# Patient Record
Sex: FEMALE | Race: WHITE | NOT HISPANIC OR LATINO | Employment: OTHER | URBAN - METROPOLITAN AREA
[De-identification: names, ages, dates, MRNs, and addresses within clinical notes are randomized per-mention and may not be internally consistent; named-entity substitution may affect disease eponyms.]

---

## 2017-01-10 ENCOUNTER — TELEPHONE (OUTPATIENT)
Dept: FAMILY MEDICINE CLINIC | Facility: CLINIC | Age: 82
End: 2017-01-10

## 2017-01-10 RX ORDER — CODEINE/BUTALBITAL/ASA/CAFFEIN 30-50-325
1 CAPSULE ORAL 4 TIMES DAILY PRN
Qty: 100 CAPSULE | Refills: 0 | Status: SHIPPED | OUTPATIENT
Start: 2017-01-10 | End: 2017-05-08 | Stop reason: SDUPTHER

## 2017-01-10 NOTE — TELEPHONE ENCOUNTER
----- Message from Lady Pacheoc sent at 1/10/2017 11:05 AM EST -----  Refill   [facility]         PLEASE PRINT PATIENT WILL

## 2017-02-21 ENCOUNTER — OFFICE VISIT (OUTPATIENT)
Dept: OBSTETRICS AND GYNECOLOGY | Facility: CLINIC | Age: 82
End: 2017-02-21

## 2017-02-21 VITALS
HEIGHT: 63 IN | BODY MASS INDEX: 19.88 KG/M2 | SYSTOLIC BLOOD PRESSURE: 148 MMHG | DIASTOLIC BLOOD PRESSURE: 88 MMHG | WEIGHT: 112.2 LBS

## 2017-02-21 DIAGNOSIS — Z01.419 WELL FEMALE EXAM WITH ROUTINE GYNECOLOGICAL EXAM: ICD-10-CM

## 2017-02-21 DIAGNOSIS — B37.31 YEAST VAGINITIS: ICD-10-CM

## 2017-02-21 DIAGNOSIS — N30.90 CYSTITIS: ICD-10-CM

## 2017-02-21 DIAGNOSIS — N95.2 ATROPHIC VAGINITIS: ICD-10-CM

## 2017-02-21 DIAGNOSIS — Z13.1 DIABETES MELLITUS SCREENING: ICD-10-CM

## 2017-02-21 DIAGNOSIS — R35.0 FREQUENCY OF URINATION: Primary | ICD-10-CM

## 2017-02-21 PROCEDURE — G0101 CA SCREEN;PELVIC/BREAST EXAM: HCPCS | Performed by: OBSTETRICS & GYNECOLOGY

## 2017-02-21 PROCEDURE — 99213 OFFICE O/P EST LOW 20 MIN: CPT | Performed by: OBSTETRICS & GYNECOLOGY

## 2017-02-21 NOTE — PROGRESS NOTES
Subjective:    Patient Veronica Babb is a 82 y.o. female.   Chief Complaint   Patient presents with   • Gynecologic Exam     Hyst. Mammo done due. Colonoscopy 6 yrs ago. Dexa due.     's patient presents today with urinary frequency which is significant every hour  during the day nocturia is not significant she does have some burning and pressure when she voids she was on the Femring and stopped it about a year ago worried about the hormones    HPI  this patient stopped her Femring a year ago with the fear of the hormones over the past month she is noted increasing urination with irritation and burning      The following portions of the patient's history were reviewed and updated as appropriate: allergies, current medications, past family history, past medical history, past social history, past surgical history and problem list.      Review of Systems   Constitutional: Negative.    HENT: Negative.    Eyes: Negative.    Respiratory: Negative.    Cardiovascular: Negative.    Gastrointestinal: Negative for abdominal distention, abdominal pain, anal bleeding, blood in stool, constipation, diarrhea, nausea, rectal pain and vomiting.   Endocrine: Negative for cold intolerance, heat intolerance, polydipsia, polyphagia and polyuria.   Genitourinary: Positive for dysuria, frequency and urgency. Negative for decreased urine volume, dyspareunia, enuresis, flank pain, genital sores, hematuria, menstrual problem, pelvic pain, vaginal bleeding, vaginal discharge and vaginal pain.        Patient also has been experiencing some bladder pressure   Musculoskeletal: Negative.    Skin: Negative.    Allergic/Immunologic: Negative.    Neurological: Negative.    Hematological: Negative for adenopathy. Does not bruise/bleed easily.   Psychiatric/Behavioral: Negative for agitation, confusion and sleep disturbance. The patient is not nervous/anxious.          Objective:      Physical Exam   Constitutional: She appears well-developed and  well-nourished. She is not intubated.   HENT:   Head: Hair is normal.   Nose: Nose normal.   Mouth/Throat: Oropharynx is clear and moist.   Eyes: Conjunctivae are normal.   Neck: Normal carotid pulses and no JVD present. No tracheal tenderness, no spinous process tenderness and no muscular tenderness present. Carotid bruit is not present. No rigidity. No edema, no erythema and normal range of motion present. No thyroid mass and no thyromegaly present.   Cardiovascular: Normal rate, regular rhythm, S1 normal and normal heart sounds.  Exam reveals no gallop.    No murmur heard.  Pulmonary/Chest: Effort normal. No accessory muscle usage or stridor. No apnea, no tachypnea and no bradypnea. She is not intubated. No respiratory distress. She has no wheezes. She has no rales. She exhibits no tenderness. Right breast exhibits no inverted nipple, no mass, no nipple discharge, no skin change and no tenderness. Left breast exhibits no inverted nipple, no mass, no nipple discharge, no skin change and no tenderness.   Abdominal: Soft. Bowel sounds are normal. She exhibits no distension and no mass. There is no tenderness. There is no rebound and no guarding. No hernia.   Genitourinary: Vagina normal. Rectal exam shows no external hemorrhoid, no internal hemorrhoid, no fissure, no mass, no tenderness and anal tone normal. There is no rash, tenderness, lesion or injury on the right labia. There is no rash, tenderness, lesion or injury on the left labia. No erythema, tenderness or bleeding in the vagina. No foreign body in the vagina. No signs of injury around the vagina. No vaginal discharge found.   Genitourinary Comments: Uterus has been removed   Musculoskeletal: She exhibits no edema or tenderness.        Right shoulder: She exhibits no tenderness, no swelling, no pain and no spasm.   Lymphadenopathy:        Head (right side): No submental, no submandibular, no tonsillar, no preauricular, no posterior auricular and no  occipital adenopathy present.        Head (left side): No submental, no submandibular, no tonsillar, no preauricular, no posterior auricular and no occipital adenopathy present.     She has no cervical adenopathy.        Right cervical: No superficial cervical, no deep cervical and no posterior cervical adenopathy present.       Left cervical: No superficial cervical, no deep cervical and no posterior cervical adenopathy present.        Right axillary: No pectoral and no lateral adenopathy present.        Left axillary: No pectoral and no lateral adenopathy present.       Right: No inguinal, no supraclavicular and no epitrochlear adenopathy present.        Left: No inguinal, no supraclavicular and no epitrochlear adenopathy present.   Neurological: No cranial nerve deficit. Coordination normal.   Skin: Skin is warm and dry. No abrasion, no bruising, no burn, no lesion, no petechiae, no purpura and no rash noted. Rash is not macular, not maculopapular, not nodular and not urticarial. No cyanosis or erythema. No pallor. Nails show no clubbing.   Psychiatric: She has a normal mood and affect. Her behavior is normal.         Assessment and Plan: Patient's having severe urinary frequency  she needs to have a urinalysis done she does not give a strong history of blood sugar elevation but should have a hemoglobin A1c just to make sure is not a diabetes she is a former smoker but quit multiple years ago lungs sound clear today she is given the option to have bladder testing done but will put her on Myrbetriq and see if this helps the frequency patient does not have significant nocturia    Veronica was seen today for gynecologic exam.    Diagnoses and all orders for this visit:    Frequency of urination    Yeast vaginitis    Cystitis    Atrophic vaginitis

## 2017-02-23 LAB
CONV .: NORMAL
CYTOLOGIST CVX/VAG CYTO: NORMAL
CYTOLOGY CVX/VAG DOC THIN PREP: NORMAL
DX ICD CODE: NORMAL
HIV 1 & 2 AB SER-IMP: NORMAL
OTHER STN SPEC: NORMAL
PATH REPORT.FINAL DX SPEC: NORMAL
STAT OF ADQ CVX/VAG CYTO-IMP: NORMAL

## 2017-02-23 RX ORDER — ZOLPIDEM TARTRATE 10 MG/1
10 TABLET ORAL NIGHTLY PRN
Qty: 30 TABLET | Refills: 0 | OUTPATIENT
Start: 2017-02-23 | End: 2017-03-20 | Stop reason: SDUPTHER

## 2017-03-08 RX ORDER — PROMETHAZINE HYDROCHLORIDE 25 MG/1
TABLET ORAL
Qty: 40 TABLET | Refills: 0 | Status: SHIPPED | OUTPATIENT
Start: 2017-03-08 | End: 2017-08-17 | Stop reason: SDUPTHER

## 2017-03-16 RX ORDER — ESCITALOPRAM OXALATE 20 MG/1
TABLET ORAL
Qty: 30 TABLET | Refills: 0 | Status: SHIPPED | OUTPATIENT
Start: 2017-03-16 | End: 2017-04-14 | Stop reason: SDUPTHER

## 2017-03-20 RX ORDER — ZOLPIDEM TARTRATE 10 MG/1
10 TABLET ORAL NIGHTLY PRN
Qty: 30 TABLET | Refills: 0 | OUTPATIENT
Start: 2017-03-20 | End: 2017-04-20 | Stop reason: SDUPTHER

## 2017-03-23 RX ORDER — LISINOPRIL 20 MG/1
TABLET ORAL
Qty: 30 TABLET | Refills: 0 | Status: SHIPPED | OUTPATIENT
Start: 2017-03-23 | End: 2017-04-20 | Stop reason: SDUPTHER

## 2017-03-31 RX ORDER — HYDROCODONE BITARTRATE AND ACETAMINOPHEN 7.5; 325 MG/1; MG/1
1 TABLET ORAL EVERY 4 HOURS PRN
Qty: 180 TABLET | Refills: 0 | Status: SHIPPED | OUTPATIENT
Start: 2017-03-31 | End: 2017-06-26 | Stop reason: SDUPTHER

## 2017-04-11 RX ORDER — SUMATRIPTAN 100 MG/1
100 TABLET, FILM COATED ORAL ONCE AS NEEDED
Qty: 12 TABLET | Refills: 0 | Status: SHIPPED | OUTPATIENT
Start: 2017-04-11 | End: 2017-04-11 | Stop reason: SDUPTHER

## 2017-04-12 RX ORDER — SUMATRIPTAN 100 MG/1
TABLET, FILM COATED ORAL
Qty: 12 TABLET | Refills: 0 | Status: SHIPPED | OUTPATIENT
Start: 2017-04-12 | End: 2017-05-10 | Stop reason: SDUPTHER

## 2017-04-14 RX ORDER — ESCITALOPRAM OXALATE 20 MG/1
20 TABLET ORAL DAILY
Qty: 30 TABLET | Refills: 1 | Status: SHIPPED | OUTPATIENT
Start: 2017-04-14 | End: 2017-06-11 | Stop reason: SDUPTHER

## 2017-04-20 RX ORDER — LISINOPRIL 20 MG/1
TABLET ORAL
Qty: 30 TABLET | Refills: 0 | Status: SHIPPED | OUTPATIENT
Start: 2017-04-20 | End: 2017-05-20 | Stop reason: SDUPTHER

## 2017-04-20 RX ORDER — ZOLPIDEM TARTRATE 10 MG/1
10 TABLET ORAL NIGHTLY PRN
Qty: 30 TABLET | Refills: 0 | OUTPATIENT
Start: 2017-04-20 | End: 2017-05-23 | Stop reason: SDUPTHER

## 2017-05-08 RX ORDER — CODEINE/BUTALBITAL/ASA/CAFFEIN 30-50-325
1 CAPSULE ORAL 4 TIMES DAILY PRN
Qty: 100 CAPSULE | Refills: 0 | OUTPATIENT
Start: 2017-05-08 | End: 2017-08-15 | Stop reason: SDUPTHER

## 2017-05-10 RX ORDER — SUMATRIPTAN 100 MG/1
TABLET, FILM COATED ORAL
Qty: 12 TABLET | Refills: 0 | Status: SHIPPED | OUTPATIENT
Start: 2017-05-10 | End: 2017-06-20 | Stop reason: SDUPTHER

## 2017-05-22 RX ORDER — LISINOPRIL 20 MG/1
TABLET ORAL
Qty: 30 TABLET | Refills: 0 | Status: SHIPPED | OUTPATIENT
Start: 2017-05-22 | End: 2017-06-22 | Stop reason: SDUPTHER

## 2017-05-23 ENCOUNTER — OFFICE VISIT (OUTPATIENT)
Dept: FAMILY MEDICINE CLINIC | Facility: CLINIC | Age: 82
End: 2017-05-23

## 2017-05-23 VITALS
HEART RATE: 90 BPM | DIASTOLIC BLOOD PRESSURE: 82 MMHG | OXYGEN SATURATION: 97 % | BODY MASS INDEX: 22.03 KG/M2 | HEIGHT: 60 IN | WEIGHT: 112.2 LBS | SYSTOLIC BLOOD PRESSURE: 156 MMHG | TEMPERATURE: 97.5 F

## 2017-05-23 DIAGNOSIS — F51.01 PRIMARY INSOMNIA: Primary | ICD-10-CM

## 2017-05-23 DIAGNOSIS — M51.9 LUMBAR DISC DISEASE: ICD-10-CM

## 2017-05-23 PROCEDURE — 99213 OFFICE O/P EST LOW 20 MIN: CPT | Performed by: FAMILY MEDICINE

## 2017-05-23 RX ORDER — ZOLPIDEM TARTRATE 10 MG/1
10 TABLET ORAL NIGHTLY PRN
Qty: 30 TABLET | Refills: 5 | OUTPATIENT
Start: 2017-05-23 | End: 2017-05-23 | Stop reason: SDUPTHER

## 2017-05-23 RX ORDER — ZOLPIDEM TARTRATE 10 MG/1
10 TABLET ORAL NIGHTLY PRN
Qty: 30 TABLET | Refills: 5 | Status: SHIPPED | OUTPATIENT
Start: 2017-05-23 | End: 2017-11-08 | Stop reason: SDUPTHER

## 2017-06-12 RX ORDER — ESCITALOPRAM OXALATE 20 MG/1
TABLET ORAL
Qty: 30 TABLET | Refills: 2 | Status: SHIPPED | OUTPATIENT
Start: 2017-06-12 | End: 2017-09-08 | Stop reason: SDUPTHER

## 2017-06-20 RX ORDER — SUMATRIPTAN 100 MG/1
TABLET, FILM COATED ORAL
Qty: 12 TABLET | Refills: 0 | Status: SHIPPED | OUTPATIENT
Start: 2017-06-20 | End: 2017-07-19 | Stop reason: SDUPTHER

## 2017-06-22 RX ORDER — LISINOPRIL 20 MG/1
TABLET ORAL
Qty: 30 TABLET | Refills: 0 | Status: SHIPPED | OUTPATIENT
Start: 2017-06-22 | End: 2017-07-20 | Stop reason: SDUPTHER

## 2017-06-26 NOTE — TELEPHONE ENCOUNTER
----- Message from Lady Pacheco sent at 6/26/2017 10:53 AM EDT -----  Refill norco 7.5 325 mg       lsd 5-23-17  Last fill 3/31/17  Last bianca 5/23/17  Due for new med contract

## 2017-06-27 ENCOUNTER — TELEPHONE (OUTPATIENT)
Dept: FAMILY MEDICINE CLINIC | Facility: CLINIC | Age: 82
End: 2017-06-27

## 2017-06-27 RX ORDER — HYDROCODONE BITARTRATE AND ACETAMINOPHEN 7.5; 325 MG/1; MG/1
1 TABLET ORAL EVERY 4 HOURS PRN
Qty: 180 TABLET | Refills: 0 | Status: SHIPPED | OUTPATIENT
Start: 2017-06-27 | End: 2017-10-10 | Stop reason: SDUPTHER

## 2017-06-30 RX ORDER — ORPHENADRINE CITRATE 100 MG/1
TABLET, EXTENDED RELEASE ORAL
Qty: 30 TABLET | Refills: 3 | Status: SHIPPED | OUTPATIENT
Start: 2017-06-30 | End: 2017-10-11

## 2017-07-13 ENCOUNTER — TELEPHONE (OUTPATIENT)
Dept: ORTHOPEDIC SURGERY | Facility: CLINIC | Age: 82
End: 2017-07-13

## 2017-07-13 NOTE — TELEPHONE ENCOUNTER
LMOM returning VM message for appt with [facility]. Has chart in [employer]

## 2017-07-14 ENCOUNTER — TELEPHONE (OUTPATIENT)
Dept: ORTHOPEDIC SURGERY | Facility: CLINIC | Age: 82
End: 2017-07-14

## 2017-07-17 NOTE — TELEPHONE ENCOUNTER
Can you please call this patient and ask what the issue is?  I will be glad to see her this Wednesday in the [place] office if needed.  Thank you

## 2017-07-19 ENCOUNTER — OFFICE VISIT (OUTPATIENT)
Dept: ORTHOPEDIC SURGERY | Facility: CLINIC | Age: 82
End: 2017-07-19

## 2017-07-19 VITALS — TEMPERATURE: 98 F | WEIGHT: 112 LBS | BODY MASS INDEX: 19.84 KG/M2 | HEIGHT: 63 IN

## 2017-07-19 DIAGNOSIS — S89.92XA KNEE INJURY, LEFT, INITIAL ENCOUNTER: Primary | ICD-10-CM

## 2017-07-19 DIAGNOSIS — Z96.641 STATUS POST TOTAL HIP REPLACEMENT, RIGHT: ICD-10-CM

## 2017-07-19 DIAGNOSIS — M17.11 PRIMARY OSTEOARTHRITIS OF RIGHT KNEE: ICD-10-CM

## 2017-07-19 PROCEDURE — 99203 OFFICE O/P NEW LOW 30 MIN: CPT | Performed by: ORTHOPAEDIC SURGERY

## 2017-07-19 PROCEDURE — 73560 X-RAY EXAM OF KNEE 1 OR 2: CPT | Performed by: ORTHOPAEDIC SURGERY

## 2017-07-19 RX ORDER — SUMATRIPTAN 100 MG/1
TABLET, FILM COATED ORAL
Qty: 12 TABLET | Refills: 0 | Status: SHIPPED | OUTPATIENT
Start: 2017-07-19 | End: 2017-09-13 | Stop reason: SDUPTHER

## 2017-07-19 RX ORDER — ESCITALOPRAM OXALATE 20 MG/1
TABLET ORAL
COMMUNITY
Start: 2015-11-10 | End: 2018-07-17 | Stop reason: SDUPTHER

## 2017-07-19 NOTE — PROGRESS NOTES
Chief Complaint   Patient presents with   • Right Leg - Pain             HPI  Pt is being seen today for RT Leg Pain and discomfort.  I have known this patient since I performed a hip arthroplasty on the right side on her and 2012.  Recently she fell and sustained an injury to the right knee.  She has significant pain and swelling of the knee.  Difficulty in going up and down the steps.  She describes her pain as an aching sensation associated with some bruising.  She also has some swelling in the knee.  This makes it difficult for her to squat on the ground.  Her hip arthroplasty is doing extremely well.  She knows that she has advanced degenerative osteoarthritis of the knee and eventually will need a knee arthroplasty.  We have discussed about treating the knee and injecting it with either a steroid or Synvisc Visco supplementation.          No Known Allergies      Social History     Social History   • Marital status:      Spouse name: N/A   • Number of children: N/A   • Years of education: N/A     Occupational History   • Not on file.     Social History Main Topics   • Smoking status: Former Smoker   • Smokeless tobacco: Never Used   • Alcohol use No   • Drug use: Yes     Special: Hydrocodone   • Sexual activity: No     Other Topics Concern   • Not on file     Social History Narrative       History reviewed. No pertinent family history.    Past Surgical History:   Procedure Laterality Date   • COLONOSCOPY  01/19/2011   • HYSTERECTOMY         Past Medical History:   Diagnosis Date   • Anemia    • Back pain    • Depression     Depression acute recurrent   • Encounter for annual health examination 01/29/2014    Annual Health Assessment   • Eustachian tube dysfunction     L Eustachian tube malfunction   • Head congestion    • Headache    • Hypertension    • Insomnia    • Left ear pain    • Wellness examination     Annual Wellness Visit: 10/20/15, 10/01/14           Vitals:    07/19/17 1443   Temp: 98 °F (36.7  °C)             Review of Systems   Constitutional: Negative.  Negative for chills, diaphoresis, fever and unexpected weight change.   HENT: Negative.  Negative for hearing loss, nosebleeds, sore throat and tinnitus.    Eyes: Negative.  Negative for pain and visual disturbance.   Respiratory: Negative.  Negative for cough, shortness of breath and wheezing.    Cardiovascular: Negative.  Negative for chest pain and palpitations.   Gastrointestinal: Negative.  Negative for abdominal pain, diarrhea, nausea and vomiting.   Endocrine: Positive for cold intolerance. Negative for heat intolerance and polydipsia.   Genitourinary: Negative.  Negative for difficulty urinating, dysuria and hematuria.   Musculoskeletal: Negative for arthralgias, joint swelling and myalgias.   Skin: Negative for rash and wound.   Allergic/Immunologic: Negative.  Negative for environmental allergies.   Neurological: Negative.  Negative for dizziness, syncope and numbness.   Hematological: Bruises/bleeds easily.   Psychiatric/Behavioral: Negative.  Negative for dysphoric mood and sleep disturbance. The patient is not nervous/anxious.            Physical Exam   Constitutional: She appears well-nourished.   HENT:   Head: Atraumatic.   Eyes: EOM are normal.   Neck: Neck supple.   Cardiovascular: Intact distal pulses.    Pulmonary/Chest: Effort normal.   Abdominal: Soft. Bowel sounds are normal.   Musculoskeletal: Normal range of motion. She exhibits tenderness.   Neurological: She is alert. She has normal reflexes.   Skin: Skin is dry.   Psychiatric: She has a normal mood and affect. Her behavior is normal. Judgment normal.   Nursing note and vitals reviewed.              Joint/Body Part Specific Exam:  right knee. Patient has crepitus throughout range of motion. Positive patellar grind test. Mild effusion. Lachman is negative. Pivot shift is negative. Anterior and posterior drawer signs are negative. Significant joint line tenderness is noted on the  medial aspect of the knee. Patient has a varus orientation of the knee. Range of motion in flexion is for 0- 110° degrees. Neurovascular status intact.  Dorsalis pedis and posterior tibial artery pulses are palpable. Common peroneal nerve function is well preserved. Patients gait is cautious and antalgic. Skin and soft tissues are swollen; consistent with synovitis and effusion    right hip. The patient is status 5 year(s) post hip prosthetic replacement for primary osteoarthritis of the hip.  The surgical approach was posterior. Incision is clean and healing well. Calf is soft and nontender. Homans sign is negative. There is no limb length discrepancy. Hip flexion is 0-90°, hip abduction is 0-30°. Neurovascular status is intact, no limb length discrepancy reported by the patient. Common peroneal nerve function is well preserved. There is no hardware related problem.      X-RAY Report:  right Knee X-Ray  Indication: Evaluation for pain and swelling of the knee after a fall  AP, Lateral views  Findings: Advanced degenerative osteoarthritis of the knee with complete loss of medial joint space and osteophyte formation  no bony lesion  Soft tissues within normal limits  decreased joint spaces  Hardware appropriately positioned not applicable      no prior studies available for comparison.    X-RAY was ordered and reviewed by Wicho Gonzalez MD          Diagnostics:            Veronica was seen today for pain.    Diagnoses and all orders for this visit:    Knee injury, left, initial encounter  -     Cancel: XR Knee 1 or 2 View Left  -     XR Knee 1 or 2 View Right            Procedures          I provided this patient with educational materials regarding bone health.        Plan:   Use a supportive brace to the knee.    Intra-articular steroid injection discussed with the patient.    Calcium and vitamin D for bone health.    Synvisc Visco supplementation discussed and offered to the patient.    Eventually she is going to  need a right total knee arthroplasty.    Tablet ibuprofen 600 mg tab 1 by mouth twice a day when necessary pain and discomfort.    , GI and dental procedure prophylaxis with antibiotics to minimize the possibility of metastatic infection to the hip implants.    Dislocation precautions.    Falls precautions.    Follow-up in 2 months.        CC To Philip Fuller MD

## 2017-07-20 RX ORDER — LISINOPRIL 20 MG/1
TABLET ORAL
Qty: 30 TABLET | Refills: 0 | Status: SHIPPED | OUTPATIENT
Start: 2017-07-20 | End: 2017-08-17 | Stop reason: SDUPTHER

## 2017-07-23 PROBLEM — Z96.641 STATUS POST TOTAL HIP REPLACEMENT, RIGHT: Status: ACTIVE | Noted: 2017-07-23

## 2017-07-23 PROBLEM — M17.11 PRIMARY OSTEOARTHRITIS OF RIGHT KNEE: Status: ACTIVE | Noted: 2017-07-23

## 2017-07-23 PROBLEM — S89.90XA KNEE INJURY: Status: ACTIVE | Noted: 2017-07-23

## 2017-08-15 RX ORDER — CODEINE/BUTALBITAL/ASA/CAFFEIN 30-50-325
1 CAPSULE ORAL 4 TIMES DAILY PRN
Qty: 100 CAPSULE | Refills: 0 | OUTPATIENT
Start: 2017-08-15 | End: 2017-11-28 | Stop reason: SDUPTHER

## 2017-08-17 RX ORDER — LISINOPRIL 20 MG/1
TABLET ORAL
Qty: 30 TABLET | Refills: 4 | Status: SHIPPED | OUTPATIENT
Start: 2017-08-17 | End: 2017-11-01

## 2017-08-17 RX ORDER — PROMETHAZINE HYDROCHLORIDE 25 MG/1
25 TABLET ORAL EVERY 4 HOURS PRN
Qty: 40 TABLET | Refills: 0 | Status: SHIPPED | OUTPATIENT
Start: 2017-08-17 | End: 2018-02-13 | Stop reason: SDUPTHER

## 2017-09-08 RX ORDER — ESCITALOPRAM OXALATE 20 MG/1
TABLET ORAL
Qty: 30 TABLET | Refills: 0 | Status: SHIPPED | OUTPATIENT
Start: 2017-09-08 | End: 2017-10-07 | Stop reason: SDUPTHER

## 2017-09-13 RX ORDER — SUMATRIPTAN 100 MG/1
TABLET, FILM COATED ORAL
Qty: 12 TABLET | Refills: 0 | Status: SHIPPED | OUTPATIENT
Start: 2017-09-13 | End: 2017-10-26 | Stop reason: SDUPTHER

## 2017-09-18 ENCOUNTER — OFFICE VISIT (OUTPATIENT)
Dept: OBSTETRICS AND GYNECOLOGY | Facility: CLINIC | Age: 82
End: 2017-09-18

## 2017-09-18 DIAGNOSIS — N95.2 VAGINAL ATROPHY: ICD-10-CM

## 2017-09-18 DIAGNOSIS — R53.82 CHRONIC FATIGUE: Primary | ICD-10-CM

## 2017-09-18 PROCEDURE — G0101 CA SCREEN;PELVIC/BREAST EXAM: HCPCS | Performed by: OBSTETRICS & GYNECOLOGY

## 2017-09-20 ENCOUNTER — OFFICE VISIT (OUTPATIENT)
Dept: ORTHOPEDIC SURGERY | Facility: CLINIC | Age: 82
End: 2017-09-20

## 2017-09-20 DIAGNOSIS — M17.11 PRIMARY OSTEOARTHRITIS OF RIGHT KNEE: ICD-10-CM

## 2017-09-20 DIAGNOSIS — Z96.641 STATUS POST TOTAL HIP REPLACEMENT, RIGHT: Primary | ICD-10-CM

## 2017-09-20 PROCEDURE — 99213 OFFICE O/P EST LOW 20 MIN: CPT | Performed by: ORTHOPAEDIC SURGERY

## 2017-09-20 NOTE — PROGRESS NOTES
Chief Complaint   Patient presents with   • Right Knee - Follow-up           HPI  The patient is here today for a follow up of her right knee.Patient has stable symptoms on the right side.  Most of her pain is associated with some swelling and difficulty in flexion.  She does find it difficult to go up and down the steps or to walk on inclined surfaces.  Overall her knee is doing reasonably well and her symptoms are Full with conservative, nonoperative care.  She had a right total hip arthroplasty performed by me 5 years ago.  That surgery has done very well for the patient.  It improved her quality of life very significantly.  She does not have a limb length discrepancy.  Her ambulation distance improved significantly after the hip arthroplasty.  She has thought about a knee replacement in the future but not any time soon because her symptoms are tolerable.         There were no vitals filed for this visit.        Review of Systems   Constitutional: Negative.    HENT: Negative.    Eyes: Negative.    Respiratory: Negative.    Cardiovascular: Negative.    Gastrointestinal: Negative.    Endocrine: Negative.    Genitourinary: Negative.    Musculoskeletal: Negative.    Skin: Negative.    Allergic/Immunologic: Negative.    Neurological: Negative.    Hematological: Negative.    Psychiatric/Behavioral: Negative.            Physical Exam   Constitutional: She is oriented to person, place, and time. She appears well-nourished.   HENT:   Head: Atraumatic.   Eyes: EOM are normal.   Neck: Neck supple.   Cardiovascular: Normal rate and intact distal pulses.    Pulmonary/Chest: Breath sounds normal.   Abdominal: Bowel sounds are normal.   Musculoskeletal: She exhibits edema and tenderness. She exhibits no deformity.   Neurological: She is alert and oriented to person, place, and time. She has normal reflexes.   Skin: Skin is dry.   Psychiatric: She has a normal mood and affect. Her behavior is normal. Judgment and thought content  normal.   Nursing note and vitals reviewed.          Joint/Body Part Specific Exam:  Right knee. Patient has crepitus throughout range of motion. Positive patellar grind test. Mild effusion. Lachman is negative. Pivot shift is negative. Anterior and posterior drawer signs are negative. Significant joint line tenderness is noted on the medial aspect of the knee. Patient has a varus orientation of the knee. Range of motion in flexion is for 0- 110° degrees. Neurovascular status intact.  Dorsalis pedis and posterior tibial artery pulses are palpable. Common peroneal nerve function is well preserved. Patients gait is cautious and antalgic. Skin and soft tissues are swollen; consistent with synovitis and effusion    right hip. Patient is post op 5 year(s) from total hip arthoplasty, direct posterior. Incision is clean and healing well. Calf is soft and nontender. Homans sign is negative. Skin and soft tissues are appropriate for postoperative status of the patient. Hip flexion is 0-90 degrees, hip abduction is 0-30 degrees. No limb lenth discrepancy. Neurovascular status is intact. Patients gait is significantly improved. The pain relief is extremely dramatic for the patient. Dorsalis pedis and posterior tibial artery pulses palpable. Common peroneal function is well preserved. There is no evidence of cellulitis or erythema or deep seated joint infection.    X-RAY Report:        Diagnostics:        Veronica was seen today for follow-up.    Diagnoses and all orders for this visit:    Status post total hip replacement, right    Primary osteoarthritis of right knee            Procedures        Plan:  Weightbearing as tolerated with stretching sensing exercises of the right knee quads and hamstrings.    Dislocation precautions for the right hip arthroplasty.    , GI and dental procedure prophylaxis with antibiotics to prevent a metastatic infection to the hip implants.    Synvisc Visco supplementation as well as  intra-articular steroid injection for the right knee arthritis discussed and offered to the patient.    Use a supportive brace to the right knee to prevent it from buckling and giving out.    Calcium and vitamin D for bone health.    Tablet ibuprofen 600 mg orally daily at bedtime when necessary pain and discomfort.    Follow-up in my office in 1 year for reevaluation.

## 2017-09-21 LAB
ALBUMIN SERPL-MCNC: 4.6 G/DL (ref 3.5–5.2)
ALBUMIN/GLOB SERPL: 1.6 G/DL
ALP SERPL-CCNC: 107 U/L (ref 39–117)
ALT SERPL-CCNC: 9 U/L (ref 1–33)
AST SERPL-CCNC: 16 U/L (ref 1–32)
BASOPHILS # BLD AUTO: 0.03 10*3/MM3 (ref 0–0.2)
BASOPHILS NFR BLD AUTO: 0.5 % (ref 0–1.5)
BILIRUB SERPL-MCNC: 0.2 MG/DL (ref 0.1–1.2)
BUN SERPL-MCNC: 7 MG/DL (ref 8–23)
BUN/CREAT SERPL: 9.5 (ref 7–25)
CALCIUM SERPL-MCNC: 10.8 MG/DL (ref 8.6–10.5)
CHLORIDE SERPL-SCNC: 92 MMOL/L (ref 98–107)
CO2 SERPL-SCNC: 27.9 MMOL/L (ref 22–29)
CREAT SERPL-MCNC: 0.74 MG/DL (ref 0.57–1)
EOSINOPHIL # BLD AUTO: 0.12 10*3/MM3 (ref 0–0.7)
EOSINOPHIL NFR BLD AUTO: 2.2 % (ref 0.3–6.2)
ERYTHROCYTE [DISTWIDTH] IN BLOOD BY AUTOMATED COUNT: 13.8 % (ref 11.7–13)
ESTROGEN SERPL-MCNC: 131 PG/ML
GLOBULIN SER CALC-MCNC: 2.8 GM/DL
GLUCOSE SERPL-MCNC: 102 MG/DL (ref 65–99)
HCT VFR BLD AUTO: 41.6 % (ref 35.6–45.5)
HGB BLD-MCNC: 12.9 G/DL (ref 11.9–15.5)
IMM GRANULOCYTES # BLD: 0 10*3/MM3 (ref 0–0.03)
IMM GRANULOCYTES NFR BLD: 0 % (ref 0–0.5)
LYMPHOCYTES # BLD AUTO: 0.54 10*3/MM3 (ref 0.9–4.8)
LYMPHOCYTES NFR BLD AUTO: 9.9 % (ref 19.6–45.3)
MCH RBC QN AUTO: 31.4 PG (ref 26.9–32)
MCHC RBC AUTO-ENTMCNC: 31 G/DL (ref 32.4–36.3)
MCV RBC AUTO: 101.2 FL (ref 80.5–98.2)
MONOCYTES # BLD AUTO: 0.51 10*3/MM3 (ref 0.2–1.2)
MONOCYTES NFR BLD AUTO: 9.3 % (ref 5–12)
NEUTROPHILS # BLD AUTO: 4.28 10*3/MM3 (ref 1.9–8.1)
NEUTROPHILS NFR BLD AUTO: 78.1 % (ref 42.7–76)
PLATELET # BLD AUTO: 295 10*3/MM3 (ref 140–500)
POTASSIUM SERPL-SCNC: 6 MMOL/L (ref 3.5–5.2)
PROT SERPL-MCNC: 7.4 G/DL (ref 6–8.5)
RBC # BLD AUTO: 4.11 10*6/MM3 (ref 3.9–5.2)
SODIUM SERPL-SCNC: 134 MMOL/L (ref 136–145)
WBC # BLD AUTO: 5.48 10*3/MM3 (ref 4.5–10.7)

## 2017-09-21 NOTE — PROGRESS NOTES
GYN Annual Exam     CC- Here for annual exam. Co chronic fatigue    HPI      Veronica Babb is a 82 y.o. female who presents for annual well woman exam.  OB History     No data available          Current contraception: none  History of abnormal Pap smear: no  Family history of uterine, colon or ovarian cancer: no  History of abnormal mammogram: no  Family history of breast cancer: no  Last Pap : today    Past Medical History:   Diagnosis Date   • Anemia    • Back pain    • Depression     Depression acute recurrent   • Encounter for annual health examination 01/29/2014    Annual Health Assessment   • Eustachian tube dysfunction     L Eustachian tube malfunction   • Head congestion    • Headache    • Hypertension    • Insomnia    • Left ear pain    • Wellness examination     Annual Wellness Visit: 10/20/15, 10/01/14       Past Surgical History:   Procedure Laterality Date   • COLONOSCOPY  01/19/2011   • HYSTERECTOMY           Current Outpatient Prescriptions:   •  butalbital-aspirin-caffeine-codeine (FIORINAL WITH CODEINE) -13-30 MG capsule, Take 1 capsule by mouth 4 (Four) Times a Day As Needed for Headache., Disp: 100 capsule, Rfl: 0  •  dicyclomine (BENTYL) 20 MG tablet, TAKE 1 TABLET BY MOUTH FOUR TIMES DAILY AS NEEDED FOR CRAMPS, Disp: 60 tablet, Rfl: 5  •  escitalopram (LEXAPRO) 20 MG tablet, , Disp: , Rfl:   •  escitalopram (LEXAPRO) 20 MG tablet, TAKE 1 TABLET BY MOUTH DAILY, Disp: 30 tablet, Rfl: 0  •  HYDROcodone-acetaminophen (NORCO) 7.5-325 MG per tablet, Take 1 tablet by mouth Every 4 (Four) Hours As Needed for Moderate Pain (4-6)., Disp: 180 tablet, Rfl: 0  •  lisinopril (PRINIVIL,ZESTRIL) 20 MG tablet, TAKE 1 TABLET BY MOUTH EVERY DAY, Disp: 30 tablet, Rfl: 4  •  orphenadrine (NORFLEX) 100 MG 12 hr tablet, TAKE 1 TABLET BY MOUTH TWICE DAILY, Disp: 30 tablet, Rfl: 3  •  promethazine (PHENERGAN) 25 MG tablet, Take 1 tablet by mouth Every 4 (Four) Hours As Needed for Nausea or Vomiting., Disp: 40  tablet, Rfl: 0  •  SUMAtriptan (IMITREX) 100 MG tablet, TAKE ONE TABLET BY MOUTH ONCE DAILY AS NEEDED FOR MIGRAINE FOR UP TO ONE DOSE, Disp: 12 tablet, Rfl: 0  •  zolpidem (AMBIEN) 10 MG tablet, Take 1 tablet by mouth At Night As Needed for Sleep., Disp: 30 tablet, Rfl: 5    No Known Allergies    Social History   Substance Use Topics   • Smoking status: Former Smoker   • Smokeless tobacco: Never Used   • Alcohol use No       No family history on file.    Review of Systems   Constitutional: Positive for fatigue.   HENT: Positive for postnasal drip, rhinorrhea, sinus pressure and sneezing.    Eyes: Negative.    Respiratory: Negative.    Cardiovascular: Negative.    Gastrointestinal: Negative for abdominal distention, abdominal pain, anal bleeding, blood in stool, constipation, diarrhea, nausea, rectal pain and vomiting.   Endocrine: Positive for heat intolerance. Negative for cold intolerance, polydipsia, polyphagia and polyuria.   Genitourinary: Positive for frequency. Negative for decreased urine volume, dyspareunia, dysuria, enuresis, flank pain, genital sores, hematuria, menstrual problem, pelvic pain, urgency, vaginal bleeding, vaginal discharge and vaginal pain.   Musculoskeletal: Negative.    Skin: Negative.    Allergic/Immunologic: Negative.    Neurological: Negative.    Hematological: Negative for adenopathy. Does not bruise/bleed easily.   Psychiatric/Behavioral: Negative for agitation, confusion and sleep disturbance. The patient is not nervous/anxious.        There were no vitals taken for this visit.    Physical Exam   Constitutional: She appears well-developed and well-nourished. She is not intubated.   HENT:   Head: Hair is normal.   Nose: Nose normal.   Mouth/Throat: Oropharynx is clear and moist.   Eyes: Conjunctivae are normal.   Neck: Normal carotid pulses and no JVD present. No tracheal tenderness, no spinous process tenderness and no muscular tenderness present. Carotid bruit is not present. No  rigidity. No edema, no erythema and normal range of motion present. No thyroid mass and no thyromegaly present.   Cardiovascular: Normal rate, regular rhythm, S1 normal and normal heart sounds.  Exam reveals no gallop.    No murmur heard.  Pulmonary/Chest: Effort normal. No accessory muscle usage or stridor. No apnea, no tachypnea and no bradypnea. She is not intubated. No respiratory distress. She has no wheezes. She has no rales. She exhibits no tenderness. Right breast exhibits no inverted nipple, no mass, no nipple discharge, no skin change and no tenderness. Left breast exhibits no inverted nipple, no mass, no nipple discharge, no skin change and no tenderness.   Abdominal: Soft. Bowel sounds are normal. She exhibits no distension and no mass. There is no tenderness. There is no rebound and no guarding. No hernia.   Genitourinary: Vagina normal. Rectal exam shows no external hemorrhoid, no internal hemorrhoid, no fissure, no mass, no tenderness and anal tone normal. There is no rash, tenderness, lesion or injury on the right labia. There is no rash, tenderness, lesion or injury on the left labia. No erythema, tenderness or bleeding in the vagina. No foreign body in the vagina. No signs of injury around the vagina. No vaginal discharge found.   Genitourinary Comments: Uterus has been removed   Musculoskeletal: She exhibits no edema or tenderness.        Right shoulder: She exhibits no tenderness, no swelling, no pain and no spasm.   Lymphadenopathy:        Head (right side): No submental, no submandibular, no tonsillar, no preauricular, no posterior auricular and no occipital adenopathy present.        Head (left side): No submental, no submandibular, no tonsillar, no preauricular, no posterior auricular and no occipital adenopathy present.     She has no cervical adenopathy.        Right cervical: No superficial cervical, no deep cervical and no posterior cervical adenopathy present.       Left cervical: No  superficial cervical, no deep cervical and no posterior cervical adenopathy present.        Right axillary: No pectoral and no lateral adenopathy present.        Left axillary: No pectoral and no lateral adenopathy present.       Right: No inguinal, no supraclavicular and no epitrochlear adenopathy present.        Left: No inguinal, no supraclavicular and no epitrochlear adenopathy present.   Neurological: No cranial nerve deficit. Coordination normal.   Skin: Skin is warm and dry. No abrasion, no bruising, no burn, no lesion, no petechiae, no purpura and no rash noted. Rash is not macular, not maculopapular, not nodular and not urticarial. No cyanosis or erythema. No pallor. Nails show no clubbing.   Psychiatric: She has a normal mood and affect. Her behavior is normal.          Assessment     1) GYN annual well woman exam.   2) long discussion concerning the chronic fatigue patient does feel somewhat depressed she is having as problems with her knee and may have to have the surgery she is had a hip replaced with do the hormone studies and do the just a CMP to see how the patient is doing and make sure it is not diabetes or some other significant medical problems she is on Lexapro     Plan     1) Breast Health - Clinical breast exam & mammogram yearly, Self breast awareness monthly  2) Pap - today  3) Smoking status-   4) Colon health - screening colonoscopy recommended if not up to date  5) Bone health - Weight bearing exercise, dietary calcium recommendations and vitamin D reviewed.   6) Seat belts recommended  7) Follow up prn and one year  8) BMI discussed  9) Immunizations discussed      Lauri Singh MD   9/21/2017  9:46 AM

## 2017-09-27 ENCOUNTER — TELEPHONE (OUTPATIENT)
Dept: OBSTETRICS AND GYNECOLOGY | Facility: CLINIC | Age: 82
End: 2017-09-27

## 2017-10-09 RX ORDER — DICYCLOMINE HCL 20 MG
TABLET ORAL
Qty: 60 TABLET | Refills: 0 | Status: SHIPPED | OUTPATIENT
Start: 2017-10-09 | End: 2018-01-10 | Stop reason: SDUPTHER

## 2017-10-09 RX ORDER — ESCITALOPRAM OXALATE 20 MG/1
TABLET ORAL
Qty: 30 TABLET | Refills: 0 | Status: SHIPPED | OUTPATIENT
Start: 2017-10-09 | End: 2017-10-11 | Stop reason: SDUPTHER

## 2017-10-10 NOTE — TELEPHONE ENCOUNTER
----- Message from Shirley Rodriguez sent at 10/10/2017  3:12 PM EDT -----  [phone]    REFILL ON [employer] 7.5

## 2017-10-10 NOTE — TELEPHONE ENCOUNTER
----- Message from Shirley Rodriguez sent at 10/10/2017  3:12 PM EDT -----  [phone]    REFILL ON NORCO 7.5    Last office visit 5/23/17  Last fill 6/27/17  Last bianca 6/28/17  Given appt with NP for tomorrow had a fall 2wks ago at home c/o rt. Shoulder pain

## 2017-10-11 ENCOUNTER — OFFICE VISIT (OUTPATIENT)
Dept: FAMILY MEDICINE CLINIC | Facility: CLINIC | Age: 82
End: 2017-10-11

## 2017-10-11 ENCOUNTER — HOSPITAL ENCOUNTER (OUTPATIENT)
Dept: GENERAL RADIOLOGY | Facility: HOSPITAL | Age: 82
Discharge: HOME | End: 2017-10-11
Admitting: NURSE PRACTITIONER

## 2017-10-11 VITALS
OXYGEN SATURATION: 98 % | BODY MASS INDEX: 19.91 KG/M2 | DIASTOLIC BLOOD PRESSURE: 92 MMHG | SYSTOLIC BLOOD PRESSURE: 150 MMHG | HEIGHT: 63 IN | HEART RATE: 71 BPM | WEIGHT: 112.4 LBS | TEMPERATURE: 98.2 F

## 2017-10-11 DIAGNOSIS — S46.911A STRAIN OF RIGHT SHOULDER, INITIAL ENCOUNTER: Primary | ICD-10-CM

## 2017-10-11 PROCEDURE — 99213 OFFICE O/P EST LOW 20 MIN: CPT | Performed by: NURSE PRACTITIONER

## 2017-10-11 PROCEDURE — 73030 X-RAY EXAM OF SHOULDER: CPT

## 2017-10-11 RX ORDER — INFLUENZA A VIRUS A/MICHIGAN/45/2015 X-275 (H1N1) ANTIGEN (FORMALDEHYDE INACTIVATED), INFLUENZA A VIRUS A/SINGAPORE/INFIMH-16-0019/2016 IVR-186 (H3N2) ANTIGEN (FORMALDEHYDE INACTIVATED), AND INFLUENZA B VIRUS B/MARYLAND/15/2016 BX-69A (A B/COLORADO/6/2017-LIKE VIRUS) ANTIGEN (FORMALDEHYDE INACTIVATED) 60; 60; 60 UG/.5ML; UG/.5ML; UG/.5ML
INJECTION, SUSPENSION INTRAMUSCULAR
Refills: 0 | COMMUNITY
Start: 2017-10-07 | End: 2019-04-11

## 2017-10-11 RX ORDER — ESTRADIOL 2 MG/1
RING VAGINAL
Refills: 5 | COMMUNITY
Start: 2017-08-12 | End: 2018-07-12 | Stop reason: SDUPTHER

## 2017-10-11 RX ORDER — HYDROCODONE BITARTRATE AND ACETAMINOPHEN 7.5; 325 MG/1; MG/1
1 TABLET ORAL EVERY 4 HOURS PRN
Qty: 180 TABLET | Refills: 0 | Status: SHIPPED | OUTPATIENT
Start: 2017-10-11 | End: 2018-02-06 | Stop reason: SDUPTHER

## 2017-10-11 NOTE — PROGRESS NOTES
Subjective   Veronica Babb is a 82 y.o. female presents with recent fall, approximately one month ago, now with continued shoulder pain, stable. Not worsening. Fell on right side. Pain did not start immediately, but approximately one week later, developed hematoma to face, bruising on right arm. Pain is located in right upper arm. Now dropping things and unable to lift arm above.      Shoulder Injury    The incident occurred at home. The right shoulder is affected. The incident occurred more than 1 week ago. The injury mechanism was a fall. The quality of the pain is described as aching. The pain does not radiate. The pain is at a severity of 5/10. The pain is moderate. Associated symptoms include muscle weakness. Pertinent negatives include no chest pain, numbness or tingling. The symptoms are aggravated by movement and overhead lifting. Treatments tried: norco. The treatment provided moderate relief.        The following portions of the patient's history were reviewed and updated as appropriate: allergies, current medications, past family history, past medical history, past social history, past surgical history and problem list.    Review of Systems   Constitutional: Negative.    HENT: Negative.    Eyes: Negative.    Respiratory: Negative.    Cardiovascular: Negative.  Negative for chest pain.   Gastrointestinal: Negative.    Endocrine: Negative.    Genitourinary: Negative.    Musculoskeletal: Positive for arthralgias (right shoulder and into right arm,, pain when lifting above head, trying to lift objects. ). Negative for joint swelling.   Skin: Negative.    Allergic/Immunologic: Negative.    Neurological: Negative.  Negative for tingling and numbness.   Hematological: Negative.    Psychiatric/Behavioral: Negative.        Objective   Physical Exam   Constitutional: She is oriented to person, place, and time. She appears well-developed and well-nourished.   Cardiovascular: Normal rate, regular rhythm and normal  heart sounds.  Exam reveals no gallop and no friction rub.    No murmur heard.  Pulmonary/Chest: Effort normal and breath sounds normal. No respiratory distress. She has no wheezes. She has no rales.   Musculoskeletal: She exhibits no edema or tenderness.        Right shoulder: She exhibits decreased range of motion. She exhibits no tenderness, no bony tenderness, no swelling, no effusion and no crepitus.   Decreased ROM straight raised arm, adduction and behind back.    Neurological: She is alert and oriented to person, place, and time.   Vitals reviewed.      Assessment/Plan   Veronica was seen today for fall.    Diagnoses and all orders for this visit:    Strain of right shoulder, initial encounter  -     XR Shoulder 2+ View Right  -     Ambulatory Referral to Physical Therapy Evaluate and treat

## 2017-10-26 RX ORDER — SUMATRIPTAN 100 MG/1
TABLET, FILM COATED ORAL
Qty: 12 TABLET | Refills: 0 | Status: SHIPPED | OUTPATIENT
Start: 2017-10-26 | End: 2017-12-16 | Stop reason: SDUPTHER

## 2017-10-31 ENCOUNTER — TELEPHONE (OUTPATIENT)
Dept: FAMILY MEDICINE CLINIC | Facility: CLINIC | Age: 82
End: 2017-10-31

## 2017-10-31 NOTE — TELEPHONE ENCOUNTER
----- Message from Shirley Rodriguez sent at 10/31/2017  1:08 PM EDT -----  [phone] [facility]    AN MA PLEASE CALL [facility] ON THIS PT     [facility] called pt's b/p has been 170/94 too high for therapy patient called given appt with JUSTIN Jackson tomorrow.

## 2017-11-01 ENCOUNTER — OFFICE VISIT (OUTPATIENT)
Dept: FAMILY MEDICINE CLINIC | Facility: CLINIC | Age: 82
End: 2017-11-01

## 2017-11-01 VITALS
DIASTOLIC BLOOD PRESSURE: 100 MMHG | BODY MASS INDEX: 20.2 KG/M2 | HEART RATE: 79 BPM | TEMPERATURE: 97.4 F | HEIGHT: 63 IN | OXYGEN SATURATION: 97 % | SYSTOLIC BLOOD PRESSURE: 158 MMHG | WEIGHT: 114 LBS

## 2017-11-01 DIAGNOSIS — I10 ESSENTIAL HYPERTENSION: Primary | ICD-10-CM

## 2017-11-01 DIAGNOSIS — M62.838 TRAPEZIUS MUSCLE SPASM: ICD-10-CM

## 2017-11-01 PROCEDURE — 99213 OFFICE O/P EST LOW 20 MIN: CPT | Performed by: NURSE PRACTITIONER

## 2017-11-01 RX ORDER — CYCLOBENZAPRINE HCL 5 MG
5 TABLET ORAL NIGHTLY PRN
Qty: 30 TABLET | Refills: 0 | Status: SHIPPED | OUTPATIENT
Start: 2017-11-01 | End: 2018-10-23 | Stop reason: SDUPTHER

## 2017-11-01 RX ORDER — LISINOPRIL 40 MG/1
40 TABLET ORAL DAILY
Qty: 30 TABLET | Refills: 3 | Status: SHIPPED | OUTPATIENT
Start: 2017-11-01 | End: 2018-03-02 | Stop reason: SDUPTHER

## 2017-11-01 NOTE — PATIENT INSTRUCTIONS
Pain can increase blood pressure.     Monitor blood pressure at home, document in a journal. Notify our office if blood pressure is running > 160 or > 100 or < 100.     Take flexeril at bedtime. This does cause drowsiness. Do not drink or operate heavy machinery while taking this medication. Apply heat as needed.

## 2017-11-01 NOTE — PROGRESS NOTES
Subjective   Veronica Babb is a 82 y.o. female presents for increased blood pressure. Has tried to do physical therapy for her right shoulder but unable to do so due to blood pressure being increased at 180's/100's or under. Advised to follow up with PCP. Denies any shortness of breath or chest pain, lower extremity edema. Is continuing to have increased neck pain and right shoulder pain. Has not tried a muscle relaxer. Has not have previous problems with blood pressure.    Hypertension   This is a chronic problem. The current episode started more than 1 year ago. The problem has been rapidly worsening since onset. The problem is uncontrolled. Associated symptoms include neck pain. Pertinent negatives include no anxiety, blurred vision, chest pain, headaches, malaise/fatigue, orthopnea, palpitations, peripheral edema, PND, shortness of breath or sweats. There are no associated agents to hypertension. There are no known risk factors for coronary artery disease. Past treatments include calcium channel blockers. The current treatment provides moderate improvement. There are no compliance problems.    Shoulder Injury    The right shoulder is affected. The incident occurred more than 1 week ago. There was no injury mechanism. The quality of the pain is described as aching. The pain does not radiate. The pain is at a severity of 0/10. The patient is experiencing no pain. Pertinent negatives include no chest pain, muscle weakness, numbness or tingling. The symptoms are aggravated by movement and overhead lifting. She has tried non-weight bearing and rest (physical therapy) for the symptoms. The treatment provided mild relief.        The following portions of the patient's history were reviewed and updated as appropriate: allergies, current medications, past family history, past medical history, past social history, past surgical history and problem list.    Review of Systems   Constitutional: Negative.  Negative for  malaise/fatigue.   HENT: Negative.    Eyes: Negative.  Negative for blurred vision.   Respiratory: Negative.  Negative for shortness of breath.    Cardiovascular: Negative.  Negative for chest pain, palpitations, orthopnea and PND.   Gastrointestinal: Negative.    Endocrine: Negative.    Genitourinary: Negative.    Musculoskeletal: Positive for arthralgias (right shoulder) and neck pain.   Skin: Negative.    Allergic/Immunologic: Negative.    Neurological: Negative for tingling, numbness and headaches.   Hematological: Negative.    Psychiatric/Behavioral: Negative.        Objective   Physical Exam   Constitutional: She is oriented to person, place, and time. She appears well-developed and well-nourished.   Neck: Neck supple.   Cardiovascular: Normal rate, regular rhythm and normal heart sounds.  Exam reveals no gallop and no friction rub.    No murmur heard.  Pulmonary/Chest: Effort normal and breath sounds normal. No respiratory distress. She has no wheezes. She has no rales.   Abdominal: Soft.   Musculoskeletal:        Right shoulder: She exhibits decreased range of motion and pain. She exhibits no tenderness, no bony tenderness, no swelling, no effusion, no crepitus, no deformity, no laceration, no spasm, normal pulse and normal strength.   Bilateral neck tenderness with palpation, decreased ROM on right shoulder,    Neurological: She is alert and oriented to person, place, and time.   Skin: Skin is warm and dry.   Psychiatric: She has a normal mood and affect.   Vitals reviewed.      Assessment/Plan   Veronica was seen today for hypertension.    Diagnoses and all orders for this visit:    Essential hypertension    Trapezius muscle spasm    Other orders  -     lisinopril (PRINIVIL,ZESTRIL) 40 MG tablet; Take 1 tablet by mouth Daily.  -     cyclobenzaprine (FLEXERIL) 5 MG tablet; Take 1 tablet by mouth At Night As Needed for Muscle Spasms.

## 2017-11-06 RX ORDER — ESCITALOPRAM OXALATE 20 MG/1
TABLET ORAL
Qty: 30 TABLET | Refills: 0 | Status: SHIPPED | OUTPATIENT
Start: 2017-11-06 | End: 2017-12-05 | Stop reason: SDUPTHER

## 2017-11-07 RX ORDER — ZOLPIDEM TARTRATE 10 MG/1
TABLET ORAL
Qty: 30 TABLET | Refills: 0 | Status: CANCELLED | OUTPATIENT
Start: 2017-11-07

## 2017-11-08 RX ORDER — ZOLPIDEM TARTRATE 10 MG/1
10 TABLET ORAL NIGHTLY PRN
Qty: 30 TABLET | Refills: 5 | OUTPATIENT
Start: 2017-11-08 | End: 2018-04-24 | Stop reason: SDUPTHER

## 2017-11-28 ENCOUNTER — TELEPHONE (OUTPATIENT)
Dept: FAMILY MEDICINE CLINIC | Facility: CLINIC | Age: 82
End: 2017-11-28

## 2017-11-28 RX ORDER — CODEINE/BUTALBITAL/ASA/CAFFEIN 30-50-325
1 CAPSULE ORAL 4 TIMES DAILY PRN
Qty: 100 CAPSULE | Refills: 0 | OUTPATIENT
Start: 2017-11-28 | End: 2017-11-28 | Stop reason: SDUPTHER

## 2017-11-28 RX ORDER — CODEINE/BUTALBITAL/ASA/CAFFEIN 30-50-325
CAPSULE ORAL
Qty: 100 CAPSULE | Refills: 0 | OUTPATIENT
Start: 2017-11-28 | End: 2018-03-19 | Stop reason: SDUPTHER

## 2017-11-28 NOTE — TELEPHONE ENCOUNTER
----- Message from Shirley Larson sent at 11/28/2017 10:36 AM EST -----  -[phone]    REFILL IB butalbital-aspirin-caffeine-codeine (FIORINAL WITH CODEINE) -32-30 MG capsule     Last ov 11/1/17  Last fill 8/15/17  Last bianca 10/13/17  Next appt 1/3/17

## 2017-12-05 RX ORDER — ESCITALOPRAM OXALATE 20 MG/1
TABLET ORAL
Qty: 30 TABLET | Refills: 0 | Status: SHIPPED | OUTPATIENT
Start: 2017-12-05 | End: 2018-01-03 | Stop reason: SDUPTHER

## 2017-12-18 RX ORDER — SUMATRIPTAN 100 MG/1
TABLET, FILM COATED ORAL
Qty: 12 TABLET | Refills: 2 | Status: SHIPPED | OUTPATIENT
Start: 2017-12-18 | End: 2018-04-16 | Stop reason: SDUPTHER

## 2018-01-03 RX ORDER — ESCITALOPRAM OXALATE 20 MG/1
TABLET ORAL
Qty: 30 TABLET | Refills: 0 | Status: SHIPPED | OUTPATIENT
Start: 2018-01-03 | End: 2018-02-01 | Stop reason: SDUPTHER

## 2018-01-10 RX ORDER — DICYCLOMINE HCL 20 MG
TABLET ORAL
Qty: 60 TABLET | Refills: 0 | Status: SHIPPED | OUTPATIENT
Start: 2018-01-10 | End: 2018-02-28 | Stop reason: SDUPTHER

## 2018-02-01 RX ORDER — ESCITALOPRAM OXALATE 20 MG/1
TABLET ORAL
Qty: 30 TABLET | Refills: 0 | Status: SHIPPED | OUTPATIENT
Start: 2018-02-01 | End: 2018-03-02 | Stop reason: SDUPTHER

## 2018-02-06 RX ORDER — HYDROCODONE BITARTRATE AND ACETAMINOPHEN 7.5; 325 MG/1; MG/1
1 TABLET ORAL EVERY 4 HOURS PRN
Qty: 180 TABLET | Refills: 0 | Status: SHIPPED | OUTPATIENT
Start: 2018-02-06 | End: 2018-05-24 | Stop reason: SDUPTHER

## 2018-02-13 RX ORDER — PROMETHAZINE HYDROCHLORIDE 25 MG/1
TABLET ORAL
Qty: 40 TABLET | Refills: 0 | Status: SHIPPED | OUTPATIENT
Start: 2018-02-13 | End: 2018-08-01 | Stop reason: SDUPTHER

## 2018-02-28 RX ORDER — DICYCLOMINE HCL 20 MG
TABLET ORAL
Qty: 60 TABLET | Refills: 0 | Status: SHIPPED | OUTPATIENT
Start: 2018-02-28 | End: 2018-04-12 | Stop reason: SDUPTHER

## 2018-03-02 RX ORDER — LISINOPRIL 40 MG/1
TABLET ORAL
Qty: 30 TABLET | Refills: 0 | Status: SHIPPED | OUTPATIENT
Start: 2018-03-02 | End: 2018-03-29 | Stop reason: SDUPTHER

## 2018-03-02 RX ORDER — ESCITALOPRAM OXALATE 20 MG/1
TABLET ORAL
Qty: 30 TABLET | Refills: 0 | Status: SHIPPED | OUTPATIENT
Start: 2018-03-02 | End: 2018-09-13 | Stop reason: SDUPTHER

## 2018-03-20 RX ORDER — CODEINE/BUTALBITAL/ASA/CAFFEIN 30-50-325
1 CAPSULE ORAL EVERY 4 HOURS PRN
Qty: 100 CAPSULE | Refills: 0 | Status: SHIPPED | OUTPATIENT
Start: 2018-03-20 | End: 2018-07-17 | Stop reason: SDUPTHER

## 2018-03-27 ENCOUNTER — OFFICE VISIT (OUTPATIENT)
Dept: INTERNAL MEDICINE | Facility: CLINIC | Age: 83
End: 2018-03-27

## 2018-03-27 VITALS
WEIGHT: 123.2 LBS | BODY MASS INDEX: 21.83 KG/M2 | DIASTOLIC BLOOD PRESSURE: 64 MMHG | HEIGHT: 63 IN | HEART RATE: 68 BPM | TEMPERATURE: 98.1 F | OXYGEN SATURATION: 91 % | SYSTOLIC BLOOD PRESSURE: 163 MMHG

## 2018-03-27 DIAGNOSIS — K21.00 GASTROESOPHAGEAL REFLUX DISEASE WITH ESOPHAGITIS: Primary | ICD-10-CM

## 2018-03-27 PROCEDURE — 99213 OFFICE O/P EST LOW 20 MIN: CPT | Performed by: FAMILY MEDICINE

## 2018-03-27 RX ORDER — OMEPRAZOLE 40 MG/1
40 CAPSULE, DELAYED RELEASE ORAL DAILY
Qty: 30 CAPSULE | Refills: 6 | Status: SHIPPED | OUTPATIENT
Start: 2018-03-27 | End: 2019-01-26 | Stop reason: SDUPTHER

## 2018-03-27 NOTE — PROGRESS NOTES
CC:reflux    Subjective.../HPI  Patient present today with GERD-on 20 mg Nexium  4+ carbonation beverages  I have reviewed the patient's medical history in detail and updated the computerized patient record.    Past Medical History:   Diagnosis Date   • Anemia    • Back pain    • Depression     Depression acute recurrent   • Encounter for annual health examination 01/29/2014    Annual Health Assessment   • Eustachian tube dysfunction     L Eustachian tube malfunction   • Head congestion    • Headache    • Hypertension    • Insomnia    • Left ear pain    • Wellness examination     Annual Wellness Visit: 10/20/15, 10/01/14       Past Surgical History:   Procedure Laterality Date   • COLONOSCOPY  01/19/2011   • HYSTERECTOMY         No family history on file.    Social History     Social History   • Marital status:      Spouse name: N/A   • Number of children: N/A   • Years of education: N/A     Occupational History   • Not on file.     Social History Main Topics   • Smoking status: Former Smoker   • Smokeless tobacco: Never Used   • Alcohol use No   • Drug use:      Types: Hydrocodone   • Sexual activity: No     Other Topics Concern   • Not on file     Social History Narrative   • No narrative on file       Most Recent Immunizations   Administered Date(s) Administered   • Flu Vaccine High Dose PF 65YR+ 10/04/2016   • Flu Vaccine Quad PF >18YRS 08/01/2014   • Influenza, Quadrivalent 10/07/2017   • Influenza, Unspecified 08/30/2010   • Pneumococcal Conjugate (PCV7) 09/10/2011   • Pneumococcal Conjugate 13-Valent (PCV13) 10/04/2016   • Tdap 10/01/2014   • Zoster 01/01/2011       Review of Systems:   Review of Systems   Constitutional: Negative.    HENT: Negative.    Eyes: Negative.    Respiratory: Negative.    Cardiovascular: Negative.    Gastrointestinal: Negative.    Endocrine: Negative.    Genitourinary: Negative.    Musculoskeletal: Negative.    Skin: Negative.    Neurological: Negative.    Hematological:  "Negative.    Psychiatric/Behavioral: Negative.          Physical Exam   Constitutional: She is oriented to person, place, and time. She appears well-developed and well-nourished.   Cardiovascular: Normal rate, regular rhythm, normal heart sounds and intact distal pulses.    Pulmonary/Chest: Effort normal and breath sounds normal.   Neurological: She is alert and oriented to person, place, and time.   Psychiatric: She has a normal mood and affect.   Vitals reviewed.        Vital Signs     Vitals:    03/27/18 1229   BP: 163/64   BP Location: Left arm   Patient Position: Sitting   Cuff Size: Adult   Pulse: 68   Temp: 98.1 °F (36.7 °C)   TempSrc: Oral   SpO2: 91%   Weight: 55.9 kg (123 lb 3.2 oz)   Height: 160 cm (62.99\")          Results Review:      REVIEWED AND DISCUSSED CLINICAL RESULTS WITH PATIENT      Requested Prescriptions     Signed Prescriptions Disp Refills   • omeprazole (PRILOSEC) 40 MG capsule 30 capsule 6     Sig: Take 1 capsule by mouth Daily.         Current Outpatient Prescriptions:   •  butalbital-aspirin-caffeine-codeine (FIORINAL WITH CODEINE) -19-30 MG capsule, Take 1 capsule by mouth Every 4 (Four) Hours As Needed for Headache., Disp: 100 capsule, Rfl: 0  •  cyclobenzaprine (FLEXERIL) 5 MG tablet, Take 1 tablet by mouth At Night As Needed for Muscle Spasms., Disp: 30 tablet, Rfl: 0  •  dicyclomine (BENTYL) 20 MG tablet, TAKE 1 TABLET BY MOUTH FOUR TIMES DAILY AS NEEDED FOR CRAMPS, Disp: 60 tablet, Rfl: 0  •  escitalopram (LEXAPRO) 20 MG tablet, , Disp: , Rfl:   •  escitalopram (LEXAPRO) 20 MG tablet, TAKE 1 TABLET BY MOUTH DAILY, Disp: 30 tablet, Rfl: 0  •  ESTRING 2 MG vaginal ring, INSERT RING AS DIRECTED Q 3 MONTHS, Disp: , Rfl: 5  •  FLUZONE HIGH-DOSE 0.5 ML suspension prefilled syringe injection, ADM 0.5ML IM UTD, Disp: , Rfl: 0  •  HYDROcodone-acetaminophen (NORCO) 7.5-325 MG per tablet, Take 1 tablet by mouth Every 4 (Four) Hours As Needed for Moderate Pain ., Disp: 180 tablet, Rfl: " 0  •  lisinopril (PRINIVIL,ZESTRIL) 40 MG tablet, TAKE 1 TABLET BY MOUTH DAILY, Disp: 30 tablet, Rfl: 0  •  promethazine (PHENERGAN) 25 MG tablet, TAKE 1 TABLET BY MOUTH EVERY 4 HOURS AS NEEDED FOR NAUSEA OR VOMITING, Disp: 40 tablet, Rfl: 0  •  SUMAtriptan (IMITREX) 100 MG tablet, TAKE ONE TABLET BY MOUTH ONCE DAILY AS NEEDED FOR MIGRAINE FOR UP TO ONE DOSE, Disp: 12 tablet, Rfl: 2  •  zolpidem (AMBIEN) 10 MG tablet, Take 1 tablet by mouth At Night As Needed for Sleep., Disp: 30 tablet, Rfl: 5  •  omeprazole (PRILOSEC) 40 MG capsule, Take 1 capsule by mouth Daily., Disp: 30 capsule, Rfl: 6    Procedures          Diagnoses and all orders for this visit:    Gastroesophageal reflux disease with esophagitis  -     omeprazole (PRILOSEC) 40 MG capsule; Take 1 capsule by mouth Daily.         Return in about 6 weeks (around 5/8/2018) for Recheck.    Philip Fuller M.D  03/27/18  1:01 PM

## 2018-03-29 RX ORDER — LISINOPRIL 40 MG/1
TABLET ORAL
Qty: 90 TABLET | Refills: 0 | Status: SHIPPED | OUTPATIENT
Start: 2018-03-29 | End: 2018-06-25 | Stop reason: SDUPTHER

## 2018-04-12 RX ORDER — DICYCLOMINE HCL 20 MG
TABLET ORAL
Qty: 60 TABLET | Refills: 0 | Status: SHIPPED | OUTPATIENT
Start: 2018-04-12 | End: 2018-06-29 | Stop reason: SDUPTHER

## 2018-04-16 RX ORDER — SUMATRIPTAN 100 MG/1
TABLET, FILM COATED ORAL
Qty: 12 TABLET | Refills: 1 | Status: SHIPPED | OUTPATIENT
Start: 2018-04-16 | End: 2019-03-19 | Stop reason: SDUPTHER

## 2018-04-24 RX ORDER — ZOLPIDEM TARTRATE 10 MG/1
TABLET ORAL
Qty: 30 TABLET | Refills: 3 | OUTPATIENT
Start: 2018-04-24 | End: 2018-08-14 | Stop reason: SDUPTHER

## 2018-05-24 RX ORDER — HYDROCODONE BITARTRATE AND ACETAMINOPHEN 7.5; 325 MG/1; MG/1
1 TABLET ORAL EVERY 4 HOURS PRN
Qty: 180 TABLET | Refills: 0 | Status: SHIPPED | OUTPATIENT
Start: 2018-05-24 | End: 2018-10-23 | Stop reason: SDUPTHER

## 2018-06-25 RX ORDER — LISINOPRIL 40 MG/1
TABLET ORAL
Qty: 90 TABLET | Refills: 0 | Status: SHIPPED | OUTPATIENT
Start: 2018-06-25 | End: 2018-09-25 | Stop reason: SDUPTHER

## 2018-06-29 RX ORDER — DICYCLOMINE HCL 20 MG
TABLET ORAL
Qty: 60 TABLET | Refills: 0 | Status: SHIPPED | OUTPATIENT
Start: 2018-06-29 | End: 2018-08-24 | Stop reason: SDUPTHER

## 2018-07-12 RX ORDER — ESTRADIOL 2 MG/1
2 RING VAGINAL
Qty: 1 EACH | Refills: 5 | Status: SHIPPED | OUTPATIENT
Start: 2018-07-12 | End: 2020-03-04

## 2018-07-17 ENCOUNTER — OFFICE VISIT (OUTPATIENT)
Dept: INTERNAL MEDICINE | Facility: CLINIC | Age: 83
End: 2018-07-17

## 2018-07-17 VITALS
WEIGHT: 116.6 LBS | TEMPERATURE: 98.6 F | BODY MASS INDEX: 20.66 KG/M2 | HEART RATE: 69 BPM | HEIGHT: 63 IN | DIASTOLIC BLOOD PRESSURE: 86 MMHG | SYSTOLIC BLOOD PRESSURE: 180 MMHG | OXYGEN SATURATION: 95 %

## 2018-07-17 DIAGNOSIS — N32.81 OVERACTIVE BLADDER: ICD-10-CM

## 2018-07-17 DIAGNOSIS — N30.00 ACUTE CYSTITIS WITHOUT HEMATURIA: Primary | ICD-10-CM

## 2018-07-17 PROBLEM — G43.009 MIGRAINE WITHOUT AURA: Status: ACTIVE | Noted: 2018-07-17

## 2018-07-17 LAB
BILIRUB BLD-MCNC: NEGATIVE MG/DL
CLARITY, POC: CLEAR
COLOR UR: YELLOW
GLUCOSE UR STRIP-MCNC: NEGATIVE MG/DL
KETONES UR QL: NEGATIVE
LEUKOCYTE EST, POC: NEGATIVE
NITRITE UR-MCNC: NEGATIVE MG/ML
PH UR: 7 [PH] (ref 5–8)
PROT UR STRIP-MCNC: NEGATIVE MG/DL
RBC # UR STRIP: ABNORMAL /UL
SP GR UR: 1 (ref 1–1.03)
UROBILINOGEN UR QL: NORMAL

## 2018-07-17 PROCEDURE — 81003 URINALYSIS AUTO W/O SCOPE: CPT | Performed by: FAMILY MEDICINE

## 2018-07-17 PROCEDURE — 99213 OFFICE O/P EST LOW 20 MIN: CPT | Performed by: FAMILY MEDICINE

## 2018-07-17 RX ORDER — SUMATRIPTAN 100 MG/1
100 TABLET, FILM COATED ORAL 2 TIMES DAILY
Qty: 12 TABLET | Refills: 2 | Status: SHIPPED | OUTPATIENT
Start: 2018-07-17 | End: 2018-07-17

## 2018-07-17 RX ORDER — SUMATRIPTAN 100 MG/1
100 TABLET, FILM COATED ORAL 2 TIMES DAILY
Qty: 12 TABLET | Refills: 2 | Status: SHIPPED | OUTPATIENT
Start: 2018-07-17 | End: 2019-03-19 | Stop reason: SDUPTHER

## 2018-07-17 RX ORDER — CODEINE/BUTALBITAL/ASA/CAFFEIN 30-50-325
1 CAPSULE ORAL EVERY 4 HOURS PRN
Qty: 100 CAPSULE | Refills: 0 | Status: SHIPPED | OUTPATIENT
Start: 2018-07-17 | End: 2019-05-13 | Stop reason: SDUPTHER

## 2018-07-17 NOTE — PROGRESS NOTES
CC:urin frequency, L wrist rash    Subjective.../HPI  Patient present today with with urin freqency 4 - 5 akash    I have reviewed the patient's medical history in detail and updated the computerized patient record.    Past Medical History:   Diagnosis Date   • Anemia    • Back pain    • Depression     Depression acute recurrent   • Encounter for annual health examination 01/29/2014    Annual Health Assessment   • Eustachian tube dysfunction     L Eustachian tube malfunction   • Head congestion    • Headache    • Hypertension    • Insomnia    • Left ear pain    • Wellness examination     Annual Wellness Visit: 10/20/15, 10/01/14       Past Surgical History:   Procedure Laterality Date   • COLONOSCOPY  01/19/2011   • HYSTERECTOMY         No family history on file.    Social History     Social History   • Marital status:      Spouse name: N/A   • Number of children: N/A   • Years of education: N/A     Occupational History   • Not on file.     Social History Main Topics   • Smoking status: Former Smoker   • Smokeless tobacco: Never Used   • Alcohol use No   • Drug use: Yes     Types: Hydrocodone   • Sexual activity: No     Other Topics Concern   • Not on file     Social History Narrative   • No narrative on file       Most Recent Immunizations   Administered Date(s) Administered   • Flu Vaccine High Dose PF 65YR+ 10/04/2016   • Flu Vaccine Quad PF >18YRS 08/01/2014   • Influenza, Quadrivalent 10/07/2017   • Influenza, Unspecified 08/30/2010   • Pneumococcal Conjugate (PCV7) 09/10/2011   • Pneumococcal Conjugate 13-Valent (PCV13) 10/04/2016   • Tdap 10/01/2014   • Zostavax 01/01/2011       Review of Systems:   Review of Systems   Constitutional: Negative.    HENT: Negative.    Eyes: Negative.    Respiratory: Negative.    Cardiovascular: Negative.    Gastrointestinal: Negative.    Endocrine: Negative.    Genitourinary: Negative.    Musculoskeletal: Negative.    Skin: Negative.    Allergic/Immunologic: Negative.   "  Neurological: Negative.    Hematological: Negative.    Psychiatric/Behavioral: Negative.          Physical Exam   Constitutional: She is oriented to person, place, and time. She appears well-developed and well-nourished.   Cardiovascular: Normal rate, regular rhythm and normal heart sounds.    Pulmonary/Chest: Effort normal and breath sounds normal.   Neurological: She is alert and oriented to person, place, and time.   Psychiatric: She has a normal mood and affect. Her behavior is normal.   Vitals reviewed.        Vital Signs     Vitals:    07/17/18 1425   BP: 180/86   Pulse: 69   Temp: 98.6 °F (37 °C)   TempSrc: Oral   SpO2: 95%   Weight: 52.9 kg (116 lb 9.6 oz)   Height: 160 cm (62.99\")          Results Review:      REVIEWED AND DISCUSSED CLINICAL RESULTS WITH PATIENT      Requested Prescriptions     Signed Prescriptions Disp Refills   • Mirabegron ER (MYRBETRIQ) 50 MG tablet sustained-release 24 hour 24 hr tablet 30 tablet 5     Sig: Take 50 mg by mouth Daily.   • butalbital-aspirin-caffeine-codeine (FIORINAL WITH CODEINE) -39-30 MG capsule 100 capsule 0     Sig: Take 1 capsule by mouth Every 4 (Four) Hours As Needed for Headache.   • SUMAtriptan (IMITREX) 100 MG tablet 12 tablet 2     Sig: Take 1 tablet by mouth 2 (Two) Times a Day.         Current Outpatient Prescriptions:   •  butalbital-aspirin-caffeine-codeine (FIORINAL WITH CODEINE) -21-30 MG capsule, Take 1 capsule by mouth Every 4 (Four) Hours As Needed for Headache., Disp: 100 capsule, Rfl: 0  •  cyclobenzaprine (FLEXERIL) 5 MG tablet, Take 1 tablet by mouth At Night As Needed for Muscle Spasms., Disp: 30 tablet, Rfl: 0  •  dicyclomine (BENTYL) 20 MG tablet, TAKE 1 TABLET BY MOUTH FOUR TIMES DAILY AS NEEDED FOR CRAMPS, Disp: 60 tablet, Rfl: 0  •  escitalopram (LEXAPRO) 20 MG tablet, TAKE 1 TABLET BY MOUTH DAILY, Disp: 30 tablet, Rfl: 0  •  ESTRING 2 MG vaginal ring, Insert 2 mg into the vagina Every 3 (Three) Months. follow package " directions, Disp: 1 each, Rfl: 5  •  FLUZONE HIGH-DOSE 0.5 ML suspension prefilled syringe injection, ADM 0.5ML IM UTD, Disp: , Rfl: 0  •  HYDROcodone-acetaminophen (NORCO) 7.5-325 MG per tablet, Take 1 tablet by mouth Every 4 (Four) Hours As Needed for Moderate Pain ., Disp: 180 tablet, Rfl: 0  •  lisinopril (PRINIVIL,ZESTRIL) 40 MG tablet, TAKE 1 TABLET BY MOUTH DAILY, Disp: 90 tablet, Rfl: 0  •  omeprazole (PRILOSEC) 40 MG capsule, Take 1 capsule by mouth Daily., Disp: 30 capsule, Rfl: 6  •  promethazine (PHENERGAN) 25 MG tablet, TAKE 1 TABLET BY MOUTH EVERY 4 HOURS AS NEEDED FOR NAUSEA OR VOMITING, Disp: 40 tablet, Rfl: 0  •  SUMAtriptan (IMITREX) 100 MG tablet, TAKE ONE TABLET BY MOUTH ONCE DAILY AS NEEDED FOR MIGRAINE FOR UP TO ONE DOSE, Disp: 12 tablet, Rfl: 1  •  zolpidem (AMBIEN) 10 MG tablet, TAKE 1 TABLET BY MOUTH EVERY DAY AT BEDTIME AS NEEDED FOR SLEEP, Disp: 30 tablet, Rfl: 3  •  Mirabegron ER (MYRBETRIQ) 50 MG tablet sustained-release 24 hour 24 hr tablet, Take 50 mg by mouth Daily., Disp: 30 tablet, Rfl: 5  •  SUMAtriptan (IMITREX) 100 MG tablet, Take 1 tablet by mouth 2 (Two) Times a Day., Disp: 12 tablet, Rfl: 2    Procedures          Diagnoses and all orders for this visit:    Acute cystitis without hematuria  -     Urinalysis With Microscopic - Urine, Clean Catch  -     Urine Culture - Urine, Urine, Clean Catch  -     POC Urinalysis Dipstick, Automated    Overactive bladder  -     Mirabegron ER (MYRBETRIQ) 50 MG tablet sustained-release 24 hour 24 hr tablet; Take 50 mg by mouth Daily.    Other orders  -     butalbital-aspirin-caffeine-codeine (FIORINAL WITH CODEINE) -97-30 MG capsule; Take 1 capsule by mouth Every 4 (Four) Hours As Needed for Headache.  -     Discontinue: SUMAtriptan (IMITREX) 100 MG tablet; Take 1 tablet by mouth 2 (Two) Times a Day.  -     SUMAtriptan (IMITREX) 100 MG tablet; Take 1 tablet by mouth 2 (Two) Times a Day.         Return in about 3 months (around 10/17/2018)  for Recheck.    Philip Fuller M.D  07/17/18  4:28 PM

## 2018-07-19 LAB
APPEARANCE UR: CLEAR
BACTERIA #/AREA URNS HPF: ABNORMAL /[HPF]
BACTERIA UR CULT: NORMAL
BACTERIA UR CULT: NORMAL
BILIRUB UR QL STRIP: NEGATIVE
CASTS URNS MICRO: ABNORMAL
CASTS URNS QL MICRO: PRESENT /LPF
COLOR UR: YELLOW
EPI CELLS #/AREA URNS HPF: ABNORMAL /HPF
GLUCOSE UR QL: NEGATIVE
HGB UR QL STRIP: (no result)
KETONES UR QL STRIP: NEGATIVE
LEUKOCYTE ESTERASE UR QL STRIP: NEGATIVE
MICRO URNS: (no result)
MUCOUS THREADS URNS QL MICRO: PRESENT
NITRITE UR QL STRIP: NEGATIVE
PH UR STRIP: 6.5 [PH] (ref 5–7.5)
PROT UR QL STRIP: NEGATIVE
RBC #/AREA URNS HPF: ABNORMAL /HPF
SP GR UR: 1.01 (ref 1–1.03)
UROBILINOGEN UR STRIP-MCNC: 0.2 MG/DL (ref 0.2–1)
WBC #/AREA URNS HPF: ABNORMAL /HPF

## 2018-08-01 RX ORDER — PROMETHAZINE HYDROCHLORIDE 25 MG/1
TABLET ORAL
Qty: 40 TABLET | Refills: 0 | Status: SHIPPED | OUTPATIENT
Start: 2018-08-01 | End: 2019-03-22 | Stop reason: SDUPTHER

## 2018-08-16 RX ORDER — ZOLPIDEM TARTRATE 10 MG/1
TABLET ORAL
Qty: 30 TABLET | Refills: 2 | Status: SHIPPED | OUTPATIENT
Start: 2018-08-16 | End: 2018-11-05 | Stop reason: SDUPTHER

## 2018-08-24 RX ORDER — DICYCLOMINE HCL 20 MG
TABLET ORAL
Qty: 60 TABLET | Refills: 0 | Status: SHIPPED | OUTPATIENT
Start: 2018-08-24 | End: 2018-10-23 | Stop reason: SDUPTHER

## 2018-09-13 RX ORDER — ESCITALOPRAM OXALATE 20 MG/1
TABLET ORAL
Qty: 30 TABLET | Refills: 0 | Status: SHIPPED | OUTPATIENT
Start: 2018-09-13 | End: 2018-10-11 | Stop reason: SDUPTHER

## 2018-09-25 RX ORDER — LISINOPRIL 40 MG/1
TABLET ORAL
Qty: 90 TABLET | Refills: 0 | Status: SHIPPED | OUTPATIENT
Start: 2018-09-25 | End: 2019-05-04 | Stop reason: SDUPTHER

## 2018-10-11 RX ORDER — ESCITALOPRAM OXALATE 20 MG/1
TABLET ORAL
Qty: 30 TABLET | Refills: 0 | Status: SHIPPED | OUTPATIENT
Start: 2018-10-11 | End: 2018-11-08 | Stop reason: SDUPTHER

## 2018-10-23 ENCOUNTER — OFFICE VISIT (OUTPATIENT)
Dept: INTERNAL MEDICINE | Facility: CLINIC | Age: 83
End: 2018-10-23

## 2018-10-23 VITALS
TEMPERATURE: 98.3 F | SYSTOLIC BLOOD PRESSURE: 202 MMHG | DIASTOLIC BLOOD PRESSURE: 93 MMHG | OXYGEN SATURATION: 96 % | BODY MASS INDEX: 20.7 KG/M2 | HEIGHT: 63 IN | WEIGHT: 116.8 LBS | HEART RATE: 73 BPM

## 2018-10-23 DIAGNOSIS — M50.90 CERVICAL DISC DISEASE: ICD-10-CM

## 2018-10-23 DIAGNOSIS — I10 ESSENTIAL HYPERTENSION: Primary | ICD-10-CM

## 2018-10-23 PROCEDURE — 99213 OFFICE O/P EST LOW 20 MIN: CPT | Performed by: FAMILY MEDICINE

## 2018-10-23 RX ORDER — HYDROCODONE BITARTRATE AND ACETAMINOPHEN 7.5; 325 MG/1; MG/1
1 TABLET ORAL EVERY 4 HOURS PRN
Qty: 180 TABLET | Refills: 0 | Status: SHIPPED | OUTPATIENT
Start: 2018-10-23 | End: 2019-03-11 | Stop reason: SDUPTHER

## 2018-10-23 RX ORDER — DICYCLOMINE HCL 20 MG
20 TABLET ORAL EVERY 6 HOURS
Qty: 60 TABLET | Refills: 5 | Status: SHIPPED | OUTPATIENT
Start: 2018-10-23 | End: 2019-08-29 | Stop reason: SDUPTHER

## 2018-10-23 RX ORDER — AMLODIPINE BESYLATE 5 MG/1
5 TABLET ORAL DAILY
Qty: 30 TABLET | Refills: 5 | Status: SHIPPED | OUTPATIENT
Start: 2018-10-23 | End: 2019-04-11

## 2018-10-23 RX ORDER — CYCLOBENZAPRINE HCL 5 MG
5 TABLET ORAL 2 TIMES DAILY PRN
Qty: 30 TABLET | Refills: 0 | Status: SHIPPED | OUTPATIENT
Start: 2018-10-23 | End: 2020-03-04

## 2018-10-23 NOTE — PROGRESS NOTES
CC:htn,neck pain    Subjective.../HPI  Patient present today with1) htn- Veronica has a history of chronic hypertension and has been well controlled on current medications.  Patient reports has had hypertension for 15 years. She is tolerating medications without side effect. She reports no vision changes, headaches or lightheadedness. She is requesting refills of medications  2) neck pain 2 mons- L side spasms  I have reviewed the patient's medical history in detail and updated the computerized patient record.    Past Medical History:   Diagnosis Date   • Anemia    • Back pain    • Depression     Depression acute recurrent   • Encounter for annual health examination 01/29/2014    Annual Health Assessment   • Eustachian tube dysfunction     L Eustachian tube malfunction   • Head congestion    • Headache    • Hypertension    • Insomnia    • Left ear pain    • Wellness examination     Annual Wellness Visit: 10/20/15, 10/01/14       Past Surgical History:   Procedure Laterality Date   • COLONOSCOPY  01/19/2011   • HYSTERECTOMY         No family history on file.    Social History     Social History   • Marital status:      Spouse name: N/A   • Number of children: N/A   • Years of education: N/A     Occupational History   • Not on file.     Social History Main Topics   • Smoking status: Former Smoker   • Smokeless tobacco: Never Used   • Alcohol use No   • Drug use: Yes     Types: Hydrocodone   • Sexual activity: No     Other Topics Concern   • Not on file     Social History Narrative   • No narrative on file       Most Recent Immunizations   Administered Date(s) Administered   • Flu Mist 10/07/2017   • Flu Vaccine High Dose PF 65YR+ 10/04/2016   • Flu Vaccine Quad PF >18YRS 08/01/2014   • Influenza, Unspecified 08/30/2010   • Pneumococcal Conjugate (PCV7) 09/10/2011   • Pneumococcal Conjugate 13-Valent (PCV13) 10/04/2016   • Tdap 10/01/2014   • Zostavax 01/01/2011       Review of Systems:   Review of Systems  "  Constitutional: Negative.    HENT: Negative.    Eyes: Negative.    Respiratory: Negative.    Cardiovascular: Negative.    Gastrointestinal: Negative.    Endocrine: Negative.    Genitourinary: Negative.    Musculoskeletal: Negative.    Skin: Negative.    Allergic/Immunologic: Negative.    Neurological: Negative.    Hematological: Negative.    Psychiatric/Behavioral: Negative.    All other systems reviewed and are negative.        Physical Exam   Constitutional: She is oriented to person, place, and time. She appears well-developed and well-nourished.   Cardiovascular: Normal rate, regular rhythm and normal heart sounds.    Pulmonary/Chest: Effort normal.   Neurological: She is oriented to person, place, and time.   Psychiatric: She has a normal mood and affect. Her behavior is normal.         Vital Signs     Vitals:    10/23/18 1618   BP: (!) 202/93   BP Location: Left arm   Patient Position: Sitting   Cuff Size: Small Adult   Pulse: 73   Temp: 98.3 °F (36.8 °C)   TempSrc: Oral   SpO2: 96%   Weight: 53 kg (116 lb 12.8 oz)   Height: 160 cm (62.99\")          Results Review:      REVIEWED AND DISCUSSED CLINICAL RESULTS WITH PATIENT      Requested Prescriptions     Signed Prescriptions Disp Refills   • amLODIPine (NORVASC) 5 MG tablet 30 tablet 5     Sig: Take 1 tablet by mouth Daily.   • cyclobenzaprine (FLEXERIL) 5 MG tablet 30 tablet 0     Sig: Take 1 tablet by mouth 2 (Two) Times a Day As Needed for Muscle Spasms.   • HYDROcodone-acetaminophen (NORCO) 7.5-325 MG per tablet 180 tablet 0     Sig: Take 1 tablet by mouth Every 4 (Four) Hours As Needed for Moderate Pain .         Current Outpatient Prescriptions:   •  butalbital-aspirin-caffeine-codeine (FIORINAL WITH CODEINE) -32-30 MG capsule, Take 1 capsule by mouth Every 4 (Four) Hours As Needed for Headache., Disp: 100 capsule, Rfl: 0  •  cyclobenzaprine (FLEXERIL) 5 MG tablet, Take 1 tablet by mouth 2 (Two) Times a Day As Needed for Muscle Spasms., Disp: 30 " tablet, Rfl: 0  •  dicyclomine (BENTYL) 20 MG tablet, TAKE 1 TABLET BY MOUTH FOUR TIMES DAILY AS NEEDED FOR CRAMPS, Disp: 60 tablet, Rfl: 0  •  escitalopram (LEXAPRO) 20 MG tablet, TAKE 1 TABLET BY MOUTH DAILY, Disp: 30 tablet, Rfl: 0  •  ESTRING 2 MG vaginal ring, Insert 2 mg into the vagina Every 3 (Three) Months. follow package directions, Disp: 1 each, Rfl: 5  •  FLUZONE HIGH-DOSE 0.5 ML suspension prefilled syringe injection, ADM 0.5ML IM UTD, Disp: , Rfl: 0  •  HYDROcodone-acetaminophen (NORCO) 7.5-325 MG per tablet, Take 1 tablet by mouth Every 4 (Four) Hours As Needed for Moderate Pain ., Disp: 180 tablet, Rfl: 0  •  lisinopril (PRINIVIL,ZESTRIL) 40 MG tablet, TAKE 1 TABLET BY MOUTH DAILY, Disp: 90 tablet, Rfl: 0  •  Mirabegron ER (MYRBETRIQ) 50 MG tablet sustained-release 24 hour 24 hr tablet, Take 50 mg by mouth Daily., Disp: 30 tablet, Rfl: 5  •  omeprazole (PRILOSEC) 40 MG capsule, Take 1 capsule by mouth Daily., Disp: 30 capsule, Rfl: 6  •  promethazine (PHENERGAN) 25 MG tablet, TAKE 1 TABLET BY MOUTH EVERY 4 HOURS AS NEEDED FOR NAUSEA OR VOMITING, Disp: 40 tablet, Rfl: 0  •  SUMAtriptan (IMITREX) 100 MG tablet, TAKE ONE TABLET BY MOUTH ONCE DAILY AS NEEDED FOR MIGRAINE FOR UP TO ONE DOSE, Disp: 12 tablet, Rfl: 1  •  SUMAtriptan (IMITREX) 100 MG tablet, Take 1 tablet by mouth 2 (Two) Times a Day., Disp: 12 tablet, Rfl: 2  •  zolpidem (AMBIEN) 10 MG tablet, TAKE 1 TABLET BY MOUTH EVERY DAY AT BEDTIME AS NEEDED FOR SLEEP, Disp: 30 tablet, Rfl: 2  •  amLODIPine (NORVASC) 5 MG tablet, Take 1 tablet by mouth Daily., Disp: 30 tablet, Rfl: 5    Procedures          Diagnoses and all orders for this visit:    Essential hypertension  -     amLODIPine (NORVASC) 5 MG tablet; Take 1 tablet by mouth Daily.    Cervical disc disease  -     MRI Cervical Spine Without Contrast; Future  -     cyclobenzaprine (FLEXERIL) 5 MG tablet; Take 1 tablet by mouth 2 (Two) Times a Day As Needed for Muscle Spasms.  -      HYDROcodone-acetaminophen (NORCO) 7.5-325 MG per tablet; Take 1 tablet by mouth Every 4 (Four) Hours As Needed for Moderate Pain .         Return in about 2 months (around 12/23/2018).    Philip Fuller M.D  10/23/18  4:58 PM

## 2018-11-02 ENCOUNTER — APPOINTMENT (OUTPATIENT)
Dept: MRI IMAGING | Facility: HOSPITAL | Age: 83
End: 2018-11-02
Attending: FAMILY MEDICINE

## 2018-11-06 RX ORDER — ZOLPIDEM TARTRATE 10 MG/1
TABLET ORAL
Qty: 30 TABLET | Refills: 0 | Status: SHIPPED | OUTPATIENT
Start: 2018-11-06 | End: 2018-12-11 | Stop reason: SDUPTHER

## 2018-11-08 RX ORDER — ESCITALOPRAM OXALATE 20 MG/1
TABLET ORAL
Qty: 30 TABLET | Refills: 0 | Status: SHIPPED | OUTPATIENT
Start: 2018-11-08 | End: 2018-12-06 | Stop reason: SDUPTHER

## 2018-11-23 ENCOUNTER — APPOINTMENT (OUTPATIENT)
Dept: MRI IMAGING | Facility: HOSPITAL | Age: 83
End: 2018-11-23
Attending: FAMILY MEDICINE

## 2018-12-03 DIAGNOSIS — N32.81 OVERACTIVE BLADDER: ICD-10-CM

## 2018-12-03 RX ORDER — MIRABEGRON 50 MG/1
TABLET, FILM COATED, EXTENDED RELEASE ORAL
Qty: 30 TABLET | Refills: 3 | Status: SHIPPED | OUTPATIENT
Start: 2018-12-03 | End: 2019-03-28 | Stop reason: SDUPTHER

## 2018-12-06 RX ORDER — ESCITALOPRAM OXALATE 20 MG/1
TABLET ORAL
Qty: 30 TABLET | Refills: 3 | Status: SHIPPED | OUTPATIENT
Start: 2018-12-06 | End: 2019-04-06 | Stop reason: SDUPTHER

## 2018-12-10 RX ORDER — ZOLPIDEM TARTRATE 10 MG/1
TABLET ORAL
Qty: 30 TABLET | Refills: 0 | Status: CANCELLED | OUTPATIENT
Start: 2018-12-10

## 2018-12-11 RX ORDER — ZOLPIDEM TARTRATE 10 MG/1
10 TABLET ORAL NIGHTLY PRN
Qty: 30 TABLET | Refills: 0 | Status: SHIPPED | OUTPATIENT
Start: 2018-12-11 | End: 2019-01-03 | Stop reason: SDUPTHER

## 2019-01-03 RX ORDER — ZOLPIDEM TARTRATE 10 MG/1
10 TABLET ORAL NIGHTLY PRN
Qty: 30 TABLET | Refills: 0 | Status: SHIPPED | OUTPATIENT
Start: 2019-01-03 | End: 2019-02-06 | Stop reason: SDUPTHER

## 2019-01-08 ENCOUNTER — OFFICE VISIT (OUTPATIENT)
Dept: INTERNAL MEDICINE | Facility: CLINIC | Age: 84
End: 2019-01-08

## 2019-01-08 VITALS
SYSTOLIC BLOOD PRESSURE: 158 MMHG | WEIGHT: 117.6 LBS | HEART RATE: 80 BPM | BODY MASS INDEX: 21.64 KG/M2 | OXYGEN SATURATION: 98 % | RESPIRATION RATE: 16 BRPM | DIASTOLIC BLOOD PRESSURE: 82 MMHG | HEIGHT: 62 IN | TEMPERATURE: 97.5 F

## 2019-01-08 DIAGNOSIS — Z23 NEED FOR PROPHYLACTIC VACCINATION WITH STREPTOCOCCUS PNEUMONIAE (PNEUMOCOCCUS) AND INFLUENZA VACCINES: ICD-10-CM

## 2019-01-08 DIAGNOSIS — I10 ESSENTIAL HYPERTENSION: Primary | ICD-10-CM

## 2019-01-08 PROCEDURE — 90732 PPSV23 VACC 2 YRS+ SUBQ/IM: CPT | Performed by: NURSE PRACTITIONER

## 2019-01-08 PROCEDURE — G0009 ADMIN PNEUMOCOCCAL VACCINE: HCPCS | Performed by: NURSE PRACTITIONER

## 2019-01-08 PROCEDURE — 99213 OFFICE O/P EST LOW 20 MIN: CPT | Performed by: NURSE PRACTITIONER

## 2019-01-08 NOTE — PROGRESS NOTES
Subjective   Veronica Babb is a 83 y.o. female.   Chief Complaint   Patient presents with   • Follow-up     2 Months for hypertension       Patient presents for 2 month follow-up on hypertension.  This is an 83-year-old female patient of Dr. Fuller.  She was seen by her PCP on 10/23/18 at which time her blood pressure was recorded at 202/93.  At that time she was taking lisinopril 40 mg daily as therapy for hypertension.  Amlodipine 5 mg daily was added to her blood pressure regimen.  She states that she began the amlodipine but thought she was supposed to discontinue the lisinopril, so she has only been taking amlodipine for blood pressure.  She states that at home her readings have been consistently in the 140s over 80s, with an occasional spike into the 150s over 80s.  She presents today with a blood pressure of 158/82.  She reports no headaches, visual disturbance, shortness of breath, chest pain, swelling of any extremities.  She denies development of any other new issues today.         The following portions of the patient's history were reviewed and updated as appropriate: allergies, current medications, past family history, past medical history, past social history, past surgical history and problem list.    Review of Systems   Constitutional: Negative for activity change, chills, fatigue, fever, unexpected weight gain and unexpected weight loss.   HENT: Negative for congestion, hearing loss, postnasal drip, sinus pressure, sneezing, sore throat and tinnitus.    Eyes: Negative for photophobia, pain and visual disturbance.   Respiratory: Negative for cough, chest tightness, shortness of breath and wheezing.    Cardiovascular: Negative for chest pain, palpitations and leg swelling.   Gastrointestinal: Negative for abdominal distention, abdominal pain, constipation, diarrhea, nausea and vomiting.   Endocrine: Negative for polydipsia, polyphagia and polyuria.   Genitourinary: Negative for dysuria,  "frequency, hematuria and urgency.   Neurological: Negative for dizziness, weakness, numbness and headache.   All other systems reviewed and are negative.      Objective    /82   Pulse 80   Temp 97.5 °F (36.4 °C) (Oral)   Resp 16   Ht 157.5 cm (62\")   Wt 53.3 kg (117 lb 9.6 oz)   SpO2 98%   BMI 21.51 kg/m²     Physical Exam   Constitutional: She is oriented to person, place, and time. She appears well-developed and well-nourished. No distress.   HENT:   Head: Normocephalic and atraumatic.   Right Ear: External ear normal.   Left Ear: External ear normal.   Nose: Nose normal.   Mouth/Throat: Oropharynx is clear and moist. No oropharyngeal exudate.   Eyes: Conjunctivae and EOM are normal. Pupils are equal, round, and reactive to light. Right eye exhibits no discharge. Left eye exhibits no discharge.   Neck: Normal range of motion. Neck supple. No JVD present. No tracheal deviation present. No thyromegaly present.   Cardiovascular: Normal rate, regular rhythm, normal heart sounds and intact distal pulses. Exam reveals no gallop and no friction rub.   No murmur heard.  No lower extremity pitting edema   Pulmonary/Chest: Effort normal and breath sounds normal. No stridor. No respiratory distress. She has no wheezes. She has no rales. She exhibits no tenderness.   Lungs are CTA bilaterally   Musculoskeletal: Normal range of motion.   Lymphadenopathy:     She has no cervical adenopathy.   Neurological: She is alert and oriented to person, place, and time.   Skin: Skin is warm and dry. Capillary refill takes less than 2 seconds. She is not diaphoretic.   Psychiatric: She has a normal mood and affect. Her behavior is normal. Judgment and thought content normal.   Nursing note and vitals reviewed.        Assessment/Plan   Veronica was seen today for follow-up.    Diagnoses and all orders for this visit:    Essential hypertension    Need for prophylactic vaccination with Streptococcus pneumoniae (Pneumococcus) and " Influenza vaccines  -     pneumococcal polysaccharide 23-valent (PNEUMOVAX-23) vaccine 0.5 mL; Inject 0.5 mL into the appropriate muscle as directed by prescriber 1 (One) Time.  -     Pneumococcal Polysaccharide Vaccine 23-Valent (PPSV23) Greater Than or Equal To 1yo Subcutaneous / IM     - Hypertension: Continue amlodipine 5 mg daily and educated patient that she should begin taking her lisinopril 40 mg daily again and that the combination of these 2 medications will help control her hypertension.  She will continue to monitor her blood pressure daily at home and record and report any sustained high or low readings.  We will follow-up in 2 months on hypertension.    - Follow-up if symptoms persist or worsen.  Follow-up in 2 months on hypertension.  Follow-up when necessary in the meantime.

## 2019-01-26 DIAGNOSIS — K21.00 GASTROESOPHAGEAL REFLUX DISEASE WITH ESOPHAGITIS: ICD-10-CM

## 2019-01-28 RX ORDER — OMEPRAZOLE 40 MG/1
40 CAPSULE, DELAYED RELEASE ORAL DAILY
Qty: 30 CAPSULE | Refills: 3 | Status: SHIPPED | OUTPATIENT
Start: 2019-01-28 | End: 2020-03-04

## 2019-02-06 RX ORDER — ZOLPIDEM TARTRATE 10 MG/1
10 TABLET ORAL NIGHTLY PRN
Qty: 30 TABLET | Refills: 0 | Status: SHIPPED | OUTPATIENT
Start: 2019-02-06 | End: 2019-03-04 | Stop reason: SDUPTHER

## 2019-03-06 RX ORDER — ZOLPIDEM TARTRATE 10 MG/1
10 TABLET ORAL NIGHTLY PRN
Qty: 30 TABLET | Refills: 0 | Status: SHIPPED | OUTPATIENT
Start: 2019-03-06 | End: 2019-04-02 | Stop reason: SDUPTHER

## 2019-03-11 ENCOUNTER — OFFICE VISIT (OUTPATIENT)
Dept: INTERNAL MEDICINE | Facility: CLINIC | Age: 84
End: 2019-03-11

## 2019-03-11 VITALS
TEMPERATURE: 96.7 F | HEART RATE: 84 BPM | BODY MASS INDEX: 21.9 KG/M2 | DIASTOLIC BLOOD PRESSURE: 81 MMHG | HEIGHT: 62 IN | OXYGEN SATURATION: 99 % | SYSTOLIC BLOOD PRESSURE: 172 MMHG | WEIGHT: 119 LBS

## 2019-03-11 DIAGNOSIS — I10 ESSENTIAL HYPERTENSION: ICD-10-CM

## 2019-03-11 DIAGNOSIS — M50.90 CERVICAL DISC DISEASE: ICD-10-CM

## 2019-03-11 DIAGNOSIS — I10 ESSENTIAL HYPERTENSION: Primary | ICD-10-CM

## 2019-03-11 PROCEDURE — 99213 OFFICE O/P EST LOW 20 MIN: CPT | Performed by: NURSE PRACTITIONER

## 2019-03-11 RX ORDER — AMLODIPINE BESYLATE 10 MG/1
10 TABLET ORAL DAILY
Qty: 90 TABLET | Refills: 1 | Status: SHIPPED | OUTPATIENT
Start: 2019-03-11 | End: 2019-06-26 | Stop reason: SDUPTHER

## 2019-03-11 RX ORDER — AMLODIPINE BESYLATE 10 MG/1
10 TABLET ORAL DAILY
Qty: 30 TABLET | Refills: 1 | Status: SHIPPED | OUTPATIENT
Start: 2019-03-11 | End: 2019-03-11 | Stop reason: SDUPTHER

## 2019-03-11 NOTE — TELEPHONE ENCOUNTER
Pt has not been in to see Alina since 10/23/18 and have not had a refill on Narco since 10/23/18  Next OV Need One With Alina England 07/16/18  Contract Need

## 2019-03-11 NOTE — PROGRESS NOTES
Subjective   Veronica Babb is a 84 y.o. female.   Chief Complaint   Patient presents with   • Follow-up     c/c pt states here for blood pressure       Patient presents for 2-month follow-up on hypertension.  This is an 84-year-old female patient of Dr. Fuller.  She saw me on 1/8/2019 with blood pressures that were gradually increasing.  She had previously been started on amlodipine 5 mg daily by Dr. Fuller, but discontinued her lisinopril daily, as she thought the amlodipine was taking the place of the lisinopril.  She was started back on the lisinopril, 40 mg daily, and for the past 2 months has been taking both amlodipine and lisinopril consistently with reports of no new side effects, and reports excellent toleration and compliance with this medication regimen.  She states that she has not been checking her blood pressure very often at home, but when she has the blood pressure systolically is still in the 160s and 170s.  Diastolic blood pressures are running in the 80s.  She reports no headache, visual changes, shortness of breath, chest discomfort, peripheral edema.  She denies development of any other new issues today.         The following portions of the patient's history were reviewed and updated as appropriate: allergies, current medications, past family history, past medical history, past social history, past surgical history and problem list.    Review of Systems   Constitutional: Negative for activity change, chills, fatigue, fever, unexpected weight gain and unexpected weight loss.   HENT: Negative for congestion, hearing loss, postnasal drip, sinus pressure, sneezing, sore throat and tinnitus.    Eyes: Negative for photophobia, pain and visual disturbance.   Respiratory: Negative for cough, chest tightness, shortness of breath and wheezing.    Cardiovascular: Negative for chest pain, palpitations and leg swelling.   Gastrointestinal: Negative for abdominal distention, abdominal pain,  "constipation, diarrhea, nausea and vomiting.   Endocrine: Negative for polydipsia, polyphagia and polyuria.   Genitourinary: Negative for dysuria, frequency, hematuria and urgency.   Neurological: Negative for dizziness, weakness, numbness and headache.       Objective    /81 (BP Location: Left arm, Patient Position: Sitting, Cuff Size: Small Adult)   Pulse 84   Temp 96.7 °F (35.9 °C) (Oral)   Ht 157.5 cm (62\")   Wt 54 kg (119 lb)   SpO2 99%   BMI 21.77 kg/m²     Physical Exam   Constitutional: She is oriented to person, place, and time. She appears well-developed and well-nourished. No distress.   HENT:   Head: Normocephalic and atraumatic.   Right Ear: External ear normal.   Left Ear: External ear normal.   Nose: Nose normal.   Mouth/Throat: Oropharynx is clear and moist. No oropharyngeal exudate.   Eyes: Conjunctivae and EOM are normal. Pupils are equal, round, and reactive to light. Right eye exhibits no discharge. Left eye exhibits no discharge.   Neck: Normal range of motion. Neck supple. No JVD present. No tracheal deviation present. No thyromegaly present.   Cardiovascular: Normal rate, regular rhythm, normal heart sounds and intact distal pulses. Exam reveals no gallop and no friction rub.   No murmur heard.  No peripheral edema   Pulmonary/Chest: Effort normal and breath sounds normal. No stridor. No respiratory distress. She has no wheezes. She has no rales. She exhibits no tenderness.   Lungs are CTA bilaterally   Musculoskeletal: Normal range of motion.   Lymphadenopathy:     She has no cervical adenopathy.   Neurological: She is alert and oriented to person, place, and time.   Skin: Skin is warm and dry. Capillary refill takes less than 2 seconds. She is not diaphoretic.   Psychiatric: She has a normal mood and affect. Her behavior is normal. Judgment and thought content normal.   Nursing note and vitals reviewed.        Assessment/Plan   Veronica was seen today for follow-up.    Diagnoses " and all orders for this visit:    Essential hypertension  -     amLODIPine (NORVASC) 10 MG tablet; Take 1 tablet by mouth Daily.    -Her blood pressure today is not well controlled, at 172/81.  This seems consistent with what her home readings have been.  We will increase amlodipine from 5 to 10 mg daily .  Educated her on the importance of checking her blood pressure readings daily and reporting any sustained high or low readings.  Continue the lisinopril 40 mg daily as well.    -Follow-up in 1 month on hypertension.  Follow-up as needed in the meantime and routinely with PCP, Dr. Fuller.

## 2019-03-12 DIAGNOSIS — M50.90 CERVICAL DISC DISEASE: ICD-10-CM

## 2019-03-12 RX ORDER — HYDROCODONE BITARTRATE AND ACETAMINOPHEN 7.5; 325 MG/1; MG/1
1 TABLET ORAL EVERY 4 HOURS PRN
Qty: 180 TABLET | Refills: 0 | Status: SHIPPED | OUTPATIENT
Start: 2019-03-12 | End: 2019-03-12 | Stop reason: SDUPTHER

## 2019-03-12 RX ORDER — HYDROCODONE BITARTRATE AND ACETAMINOPHEN 7.5; 325 MG/1; MG/1
1 TABLET ORAL EVERY 4 HOURS PRN
Qty: 180 TABLET | Refills: 0 | Status: SHIPPED | OUTPATIENT
Start: 2019-03-12 | End: 2019-08-29 | Stop reason: SDUPTHER

## 2019-03-19 RX ORDER — SUMATRIPTAN 100 MG/1
TABLET, FILM COATED ORAL
Qty: 12 TABLET | Refills: 1 | Status: SHIPPED | OUTPATIENT
Start: 2019-03-19 | End: 2019-05-28 | Stop reason: SDUPTHER

## 2019-03-22 RX ORDER — PROMETHAZINE HYDROCHLORIDE 25 MG/1
TABLET ORAL
Qty: 40 TABLET | Refills: 0 | Status: SHIPPED | OUTPATIENT
Start: 2019-03-22 | End: 2019-05-13 | Stop reason: SDUPTHER

## 2019-03-28 DIAGNOSIS — N32.81 OVERACTIVE BLADDER: ICD-10-CM

## 2019-03-28 RX ORDER — MIRABEGRON 50 MG/1
TABLET, FILM COATED, EXTENDED RELEASE ORAL
Qty: 30 TABLET | Refills: 0 | Status: SHIPPED | OUTPATIENT
Start: 2019-03-28 | End: 2019-05-02 | Stop reason: SDUPTHER

## 2019-04-03 RX ORDER — ZOLPIDEM TARTRATE 10 MG/1
10 TABLET ORAL NIGHTLY PRN
Qty: 30 TABLET | Refills: 5 | Status: SHIPPED | OUTPATIENT
Start: 2019-04-03 | End: 2019-08-29 | Stop reason: SDUPTHER

## 2019-04-08 RX ORDER — ESCITALOPRAM OXALATE 20 MG/1
TABLET ORAL
Qty: 30 TABLET | Refills: 0 | Status: SHIPPED | OUTPATIENT
Start: 2019-04-08 | End: 2019-05-12 | Stop reason: SDUPTHER

## 2019-04-11 ENCOUNTER — OFFICE VISIT (OUTPATIENT)
Dept: INTERNAL MEDICINE | Facility: CLINIC | Age: 84
End: 2019-04-11

## 2019-04-11 VITALS
SYSTOLIC BLOOD PRESSURE: 150 MMHG | WEIGHT: 120 LBS | HEIGHT: 62 IN | HEART RATE: 73 BPM | TEMPERATURE: 98.5 F | BODY MASS INDEX: 22.08 KG/M2 | RESPIRATION RATE: 16 BRPM | DIASTOLIC BLOOD PRESSURE: 79 MMHG | OXYGEN SATURATION: 94 %

## 2019-04-11 DIAGNOSIS — E53.8 B12 DEFICIENCY: ICD-10-CM

## 2019-04-11 DIAGNOSIS — I10 ESSENTIAL HYPERTENSION: Primary | ICD-10-CM

## 2019-04-11 DIAGNOSIS — Z86.2 HISTORY OF ANEMIA: ICD-10-CM

## 2019-04-11 DIAGNOSIS — I10 ESSENTIAL HYPERTENSION: ICD-10-CM

## 2019-04-11 PROCEDURE — 99213 OFFICE O/P EST LOW 20 MIN: CPT | Performed by: NURSE PRACTITIONER

## 2019-04-11 RX ORDER — HYDROCHLOROTHIAZIDE 12.5 MG/1
12.5 TABLET ORAL DAILY
Qty: 90 TABLET | Refills: 1 | Status: SHIPPED | OUTPATIENT
Start: 2019-04-11 | End: 2019-09-24 | Stop reason: SDUPTHER

## 2019-04-11 RX ORDER — HYDROCHLOROTHIAZIDE 12.5 MG/1
12.5 TABLET ORAL DAILY
Qty: 30 TABLET | Refills: 1 | Status: SHIPPED | OUTPATIENT
Start: 2019-04-11 | End: 2019-04-11 | Stop reason: SDUPTHER

## 2019-04-11 NOTE — PROGRESS NOTES
Subjective   Veronica Babb is a 84 y.o. female.   CC: Hypertension follow-up     Presents for one-month follow-up on hypertension.  This is an 84-year-old female patient of Dr. Fuller with a history of OAB, hypertension, GERD.  She saw me on 3/11/2019 at which time her blood pressure was increased.  Her amlodipine was increased from 5-10 mg daily, and she was instructed to continue on her lisinopril 40 mg daily.  She has been monitoring her blood pressure at home and is seeing readings that have dropped from the 170s to the 150s systolically, with diastolic blood pressures consistently in the 70s and 80s.  She reports excellent compliance and toleration with the increase in her amlodipine.  She reports no peripheral edema, chest pain, shortness of breath, headache, visual changes.  She states that she is very happy with the changes that she has seen with her blood pressure.  She denies development of any other new issues today.         The following portions of the patient's history were reviewed and updated as appropriate: allergies, current medications, past family history, past medical history, past social history, past surgical history and problem list.    Review of Systems   Constitutional: Negative for activity change, chills, fatigue, fever, unexpected weight gain and unexpected weight loss.   HENT: Negative for congestion, hearing loss, postnasal drip, sinus pressure, sneezing, sore throat and tinnitus.    Eyes: Negative for photophobia, pain and visual disturbance.   Respiratory: Negative for cough, chest tightness, shortness of breath and wheezing.    Cardiovascular: Negative for chest pain, palpitations and leg swelling.   Gastrointestinal: Negative for abdominal distention, abdominal pain, constipation, diarrhea, nausea and vomiting.   Endocrine: Negative for polydipsia, polyphagia and polyuria.   Genitourinary: Negative for dysuria, frequency, hematuria and urgency.   Neurological: Negative for  "dizziness, weakness, numbness and headache.   All other systems reviewed and are negative.    Current outpatient and discharge medications have been reconciled for the patient.  Reviewed by: ANGELIA Meyers    Objective    /79 (BP Location: Left arm, Patient Position: Sitting, Cuff Size: Small Adult)   Pulse 73   Temp 98.5 °F (36.9 °C) (Oral)   Resp 16   Ht 157.5 cm (62\")   Wt 54.4 kg (120 lb)   SpO2 94%   BMI 21.95 kg/m²     Physical Exam   Constitutional: She is oriented to person, place, and time. She appears well-developed and well-nourished. No distress.   HENT:   Head: Normocephalic and atraumatic.   Right Ear: External ear normal.   Left Ear: External ear normal.   Nose: Nose normal.   Mouth/Throat: Oropharynx is clear and moist. No oropharyngeal exudate.   Eyes: Conjunctivae and EOM are normal. Pupils are equal, round, and reactive to light. Right eye exhibits no discharge. Left eye exhibits no discharge.   Neck: Normal range of motion. Neck supple.   Cardiovascular: Normal rate, regular rhythm, normal heart sounds and intact distal pulses. Exam reveals no gallop and no friction rub.   No murmur heard.  Pulmonary/Chest: Effort normal and breath sounds normal. No stridor. No respiratory distress. She has no wheezes. She has no rales. She exhibits no tenderness.   Lungs are CTA bilaterally   Musculoskeletal: Normal range of motion.   Neurological: She is alert and oriented to person, place, and time.   Skin: Skin is warm and dry. Capillary refill takes less than 2 seconds. She is not diaphoretic.   Psychiatric: She has a normal mood and affect. Her behavior is normal. Judgment and thought content normal.   Nursing note and vitals reviewed.        Assessment/Plan   Veronica was seen today for follow-up.    Diagnoses and all orders for this visit:    Essential hypertension  -     hydrochlorothiazide (HYDRODIURIL) 12.5 MG tablet; Take 1 tablet by mouth Daily.  -     Comprehensive metabolic panel; " Future  -     Iron Profile; Future  -     CBC & Differential; Future  -     Vitamin B12; Future    B12 deficiency  -     Vitamin B12; Future    History of anemia  -     Iron Profile; Future  -     CBC & Differential; Future  -     Vitamin B12; Future    -Her blood pressure today has improved.  Continue the amlodipine 10 mg daily and lisinopril 40 mg daily.  We will add hydrochlorothiazide 12.5 mg daily and check a CMP in 2 weeks.  She has questions about whether she should continue her iron, folate, vitamin B12 supplementation.  I have asked her to call the office to report her current dosages of each.  In 2 weeks when she comes back for the CMP, we will check an iron profile, CBC, and vitamin B12 level and give her further recommendations.    -She will continue to monitor her blood pressure daily and record readings and a log for follow-up in 1 month.  Follow-up in 1 month on hypertension and the addition of HCTZ.  Follow-up as needed in the meantime and routinely with PCP, Dr. Fuller.

## 2019-04-25 ENCOUNTER — RESULTS ENCOUNTER (OUTPATIENT)
Dept: INTERNAL MEDICINE | Facility: CLINIC | Age: 84
End: 2019-04-25

## 2019-04-25 DIAGNOSIS — E53.8 B12 DEFICIENCY: ICD-10-CM

## 2019-04-25 DIAGNOSIS — I10 ESSENTIAL HYPERTENSION: ICD-10-CM

## 2019-04-25 DIAGNOSIS — Z86.2 HISTORY OF ANEMIA: ICD-10-CM

## 2019-05-02 DIAGNOSIS — N32.81 OVERACTIVE BLADDER: ICD-10-CM

## 2019-05-02 RX ORDER — MIRABEGRON 50 MG/1
TABLET, FILM COATED, EXTENDED RELEASE ORAL
Qty: 30 TABLET | Refills: 0 | Status: SHIPPED | OUTPATIENT
Start: 2019-05-02 | End: 2019-05-30 | Stop reason: SDUPTHER

## 2019-05-06 RX ORDER — LISINOPRIL 40 MG/1
TABLET ORAL
Qty: 90 TABLET | Refills: 0 | Status: SHIPPED | OUTPATIENT
Start: 2019-05-06 | End: 2019-07-26 | Stop reason: SDUPTHER

## 2019-05-13 ENCOUNTER — OFFICE VISIT (OUTPATIENT)
Dept: INTERNAL MEDICINE | Facility: CLINIC | Age: 84
End: 2019-05-13

## 2019-05-13 VITALS
HEIGHT: 62 IN | SYSTOLIC BLOOD PRESSURE: 134 MMHG | HEART RATE: 80 BPM | OXYGEN SATURATION: 97 % | TEMPERATURE: 97.9 F | WEIGHT: 118 LBS | DIASTOLIC BLOOD PRESSURE: 78 MMHG | BODY MASS INDEX: 21.71 KG/M2

## 2019-05-13 DIAGNOSIS — I10 ESSENTIAL HYPERTENSION: Primary | ICD-10-CM

## 2019-05-13 PROCEDURE — 99213 OFFICE O/P EST LOW 20 MIN: CPT | Performed by: NURSE PRACTITIONER

## 2019-05-13 RX ORDER — ESCITALOPRAM OXALATE 20 MG/1
TABLET ORAL
Qty: 30 TABLET | Refills: 0 | Status: SHIPPED | OUTPATIENT
Start: 2019-05-13 | End: 2019-06-03 | Stop reason: SDUPTHER

## 2019-05-13 RX ORDER — PROMETHAZINE HYDROCHLORIDE 25 MG/1
12.5 TABLET ORAL EVERY 8 HOURS PRN
Qty: 40 TABLET | Refills: 0 | Status: SHIPPED | OUTPATIENT
Start: 2019-05-13 | End: 2020-04-10 | Stop reason: SDUPTHER

## 2019-05-13 NOTE — PROGRESS NOTES
"Subjective   Veronica Babb is a 84 y.o. female.   Chief Complaint   Patient presents with   • follow up on blood pressure     c/c pt states here for follow up on blood pressure        Patient presents for hypertension follow-up.  This is an 84-year-old female patient of Dr. Fuller.  She was seen by me on 4/11/2019 last.  At that time we continued her amlodipine 10 mg daily, lisinopril 40 mg daily and added hydrochlorthiazide 12.5 mg daily.  She reports excellent compliance and toleration with this regimen and states that her blood pressures have been \"fabulous\" at home.  They have consistently been less than 140/80.  Her blood pressure in the office today is 134/78.  She reports no headaches, visual changes shortness of breath or chest pain.  She denies development of any other new issues today.         The following portions of the patient's history were reviewed and updated as appropriate: allergies, current medications, past family history, past medical history, past social history, past surgical history and problem list.    Review of Systems   Constitutional: Negative for activity change, chills, fatigue, fever, unexpected weight gain and unexpected weight loss.   HENT: Negative for congestion, hearing loss, postnasal drip, sinus pressure, sneezing, sore throat and tinnitus.    Eyes: Negative for photophobia, pain and visual disturbance.   Respiratory: Negative for cough, chest tightness, shortness of breath and wheezing.    Cardiovascular: Negative for chest pain, palpitations and leg swelling.   Gastrointestinal: Negative for abdominal distention, abdominal pain, constipation, diarrhea, nausea and vomiting.   Endocrine: Negative for polydipsia, polyphagia and polyuria.   Genitourinary: Negative for dysuria, frequency, hematuria and urgency.   Neurological: Negative for dizziness, weakness, numbness and headache.   All other systems reviewed and are negative.      Objective    /78 (BP Location: Left " "arm, Patient Position: Sitting, Cuff Size: Small Adult)   Pulse 80   Temp 97.9 °F (36.6 °C) (Oral)   Ht 157.5 cm (62\")   Wt 53.5 kg (118 lb)   SpO2 97%   BMI 21.58 kg/m²     Physical Exam   Constitutional: She is oriented to person, place, and time. She appears well-developed and well-nourished. No distress.   HENT:   Head: Normocephalic and atraumatic.   Right Ear: External ear normal.   Left Ear: External ear normal.   Nose: Nose normal.   Mouth/Throat: Oropharynx is clear and moist. No oropharyngeal exudate.   Eyes: Conjunctivae and EOM are normal. Pupils are equal, round, and reactive to light.   Neck: Normal range of motion. Neck supple. No JVD present. No tracheal deviation present. No thyromegaly present.   No carotid bruits auscultated   Cardiovascular: Normal rate, regular rhythm, normal heart sounds and intact distal pulses. Exam reveals no gallop and no friction rub.   No murmur heard.  No peripheral pitting edema   Pulmonary/Chest: Effort normal and breath sounds normal. No stridor. No respiratory distress. She has no wheezes. She has no rales. She exhibits no tenderness.   Lungs are CTA bilaterally   Musculoskeletal: Normal range of motion.   Lymphadenopathy:     She has no cervical adenopathy.   Neurological: She is alert and oriented to person, place, and time.   Skin: Skin is warm and dry. Capillary refill takes less than 2 seconds. She is not diaphoretic.   Psychiatric: She has a normal mood and affect. Her behavior is normal. Judgment and thought content normal.   Nursing note and vitals reviewed.    Current outpatient and discharge medications have been reconciled for the patient.  Reviewed by: ANGELIA Meyers      Assessment/Plan   Veronica was seen today for follow up on blood pressure.    Diagnoses and all orders for this visit:    Essential hypertension    Other orders  -     promethazine (PHENERGAN) 25 MG tablet; Take 0.5 tablets by mouth Every 8 (Eight) Hours As Needed for Nausea or " Vomiting.      -Hypertension: This is well controlled now.  Blood pressure in the office is 134/78 and she reports similar readings at home consistently.  Continue amlodipine 10 mg daily, lisinopril 40 mg daily and hydrochlorthiazide 12.5 mg daily.  We will get a metabolic panel today to check electrolytes.    -Follow-up PRN if symptoms persist or worsen and routinely with PCP, Dr. Fuller.

## 2019-05-14 DIAGNOSIS — E87.1 HYPONATREMIA: Primary | ICD-10-CM

## 2019-05-14 LAB
ALBUMIN SERPL-MCNC: 4.4 G/DL (ref 3.5–5.2)
ALBUMIN/GLOB SERPL: 1.5 G/DL
ALP SERPL-CCNC: 98 U/L (ref 39–117)
ALT SERPL-CCNC: 12 U/L (ref 1–33)
AST SERPL-CCNC: 15 U/L (ref 1–32)
BASOPHILS # BLD AUTO: 0.04 10*3/MM3 (ref 0–0.2)
BASOPHILS NFR BLD AUTO: 0.8 % (ref 0–1.5)
BILIRUB SERPL-MCNC: 0.3 MG/DL (ref 0.2–1.2)
BUN SERPL-MCNC: 12 MG/DL (ref 8–23)
BUN/CREAT SERPL: 16 (ref 7–25)
CALCIUM SERPL-MCNC: 10.3 MG/DL (ref 8.6–10.5)
CHLORIDE SERPL-SCNC: 88 MMOL/L (ref 98–107)
CO2 SERPL-SCNC: 31.3 MMOL/L (ref 22–29)
CREAT SERPL-MCNC: 0.75 MG/DL (ref 0.57–1)
EOSINOPHIL # BLD AUTO: 0.08 10*3/MM3 (ref 0–0.4)
EOSINOPHIL NFR BLD AUTO: 1.6 % (ref 0.3–6.2)
ERYTHROCYTE [DISTWIDTH] IN BLOOD BY AUTOMATED COUNT: 13.2 % (ref 12.3–15.4)
GLOBULIN SER CALC-MCNC: 3 GM/DL
GLUCOSE SERPL-MCNC: 100 MG/DL (ref 65–99)
HCT VFR BLD AUTO: 35.6 % (ref 34–46.6)
HGB BLD-MCNC: 11.7 G/DL (ref 12–15.9)
IMM GRANULOCYTES # BLD AUTO: 0.01 10*3/MM3 (ref 0–0.05)
IMM GRANULOCYTES NFR BLD AUTO: 0.2 % (ref 0–0.5)
IRON SATN MFR SERPL: 27 % (ref 20–50)
IRON SERPL-MCNC: 94 MCG/DL (ref 37–145)
LYMPHOCYTES # BLD AUTO: 0.61 10*3/MM3 (ref 0.7–3.1)
LYMPHOCYTES NFR BLD AUTO: 11.9 % (ref 19.6–45.3)
MCH RBC QN AUTO: 30.8 PG (ref 26.6–33)
MCHC RBC AUTO-ENTMCNC: 32.9 G/DL (ref 31.5–35.7)
MCV RBC AUTO: 93.7 FL (ref 79–97)
MONOCYTES # BLD AUTO: 0.45 10*3/MM3 (ref 0.1–0.9)
MONOCYTES NFR BLD AUTO: 8.8 % (ref 5–12)
NEUTROPHILS # BLD AUTO: 3.92 10*3/MM3 (ref 1.7–7)
NEUTROPHILS NFR BLD AUTO: 76.7 % (ref 42.7–76)
NRBC BLD AUTO-RTO: 0 /100 WBC (ref 0–0.2)
PLATELET # BLD AUTO: 261 10*3/MM3 (ref 140–450)
POTASSIUM SERPL-SCNC: 4.7 MMOL/L (ref 3.5–5.2)
PROT SERPL-MCNC: 7.4 G/DL (ref 6–8.5)
RBC # BLD AUTO: 3.8 10*6/MM3 (ref 3.77–5.28)
SODIUM SERPL-SCNC: 130 MMOL/L (ref 136–145)
TIBC SERPL-MCNC: 343 MCG/DL
UIBC SERPL-MCNC: 249 MCG/DL (ref 112–346)
VIT B12 SERPL-MCNC: 1890 PG/ML (ref 211–946)
WBC # BLD AUTO: 5.11 10*3/MM3 (ref 3.4–10.8)

## 2019-05-14 RX ORDER — CODEINE/BUTALBITAL/ASA/CAFFEIN 30-50-325
1 CAPSULE ORAL EVERY 4 HOURS PRN
Qty: 100 CAPSULE | Refills: 0 | Status: SHIPPED | OUTPATIENT
Start: 2019-05-14 | End: 2019-10-23 | Stop reason: SDUPTHER

## 2019-05-28 ENCOUNTER — RESULTS ENCOUNTER (OUTPATIENT)
Dept: INTERNAL MEDICINE | Facility: CLINIC | Age: 84
End: 2019-05-28

## 2019-05-28 DIAGNOSIS — E87.1 HYPONATREMIA: ICD-10-CM

## 2019-05-29 RX ORDER — SUMATRIPTAN 100 MG/1
TABLET, FILM COATED ORAL
Qty: 12 TABLET | Refills: 0 | Status: SHIPPED | OUTPATIENT
Start: 2019-05-29 | End: 2019-06-26 | Stop reason: SDUPTHER

## 2019-05-30 DIAGNOSIS — N32.81 OVERACTIVE BLADDER: ICD-10-CM

## 2019-05-30 RX ORDER — MIRABEGRON 50 MG/1
TABLET, FILM COATED, EXTENDED RELEASE ORAL
Qty: 30 TABLET | Refills: 0 | Status: SHIPPED | OUTPATIENT
Start: 2019-05-30 | End: 2020-03-04

## 2019-06-03 RX ORDER — ESCITALOPRAM OXALATE 20 MG/1
TABLET ORAL
Qty: 30 TABLET | Refills: 0 | Status: SHIPPED | OUTPATIENT
Start: 2019-06-03 | End: 2019-10-23 | Stop reason: SDUPTHER

## 2019-06-17 ENCOUNTER — CONSULT (OUTPATIENT)
Dept: ORTHOPEDIC SURGERY | Facility: CLINIC | Age: 84
End: 2019-06-17

## 2019-06-17 VITALS — HEIGHT: 63 IN | BODY MASS INDEX: 20.52 KG/M2 | TEMPERATURE: 98.3 F | WEIGHT: 115.8 LBS

## 2019-06-17 DIAGNOSIS — M17.11 PRIMARY OSTEOARTHRITIS OF RIGHT KNEE: Primary | ICD-10-CM

## 2019-06-17 PROCEDURE — 99214 OFFICE O/P EST MOD 30 MIN: CPT | Performed by: ORTHOPAEDIC SURGERY

## 2019-06-17 PROCEDURE — 20610 DRAIN/INJ JOINT/BURSA W/O US: CPT | Performed by: ORTHOPAEDIC SURGERY

## 2019-06-17 PROCEDURE — 73562 X-RAY EXAM OF KNEE 3: CPT | Performed by: ORTHOPAEDIC SURGERY

## 2019-06-17 RX ADMIN — METHYLPREDNISOLONE ACETATE 80 MG: 80 INJECTION, SUSPENSION INTRA-ARTICULAR; INTRALESIONAL; INTRAMUSCULAR; SOFT TISSUE at 14:54

## 2019-06-17 NOTE — PROGRESS NOTES
"New Right Knee      Patient: Veronica Babb        YOB: 1935    Medical Record Number: [id number]        Chief Complaints: right knee pain  Chief Complaint   Patient presents with   • Right Knee - Establish Care, Pain       Right knee pain    History of Present Illness: This is a 84-year-old female presents with right knee pain that began intermittently now for the last several months has become more constant no history injury change in activity she can recall she has marked crepitus and is quite limited in her activities.  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 Gonzalez in the past but states he is not at the level office very much so she wanted to see someone else        Allergies: No Known Allergies    Medications:   Home Medications:  Current Outpatient Medications on File Prior to Visit   Medication Sig   • amLODIPine (NORVASC) 10 MG tablet TAKE 1 TABLET BY MOUTH DAILY   • butalbital-aspirin-caffeine-codeine (FIORINAL WITH CODEINE) -41-30 MG capsule Take 1 capsule by mouth Every 4 (Four) Hours As Needed for Headache.   • cyclobenzaprine (FLEXERIL) 5 MG tablet Take 1 tablet by mouth 2 (Two) Times a Day As Needed for Muscle Spasms.   • dicyclomine (BENTYL) 20 MG tablet Take 1 tablet by mouth Every 6 (Six) Hours.   • escitalopram (LEXAPRO) 20 MG tablet TAKE 1 TABLET BY MOUTH DAILY   • ESTRING 2 MG vaginal ring Insert 2 mg into the vagina Every 3 (Three) Months. follow package directions   • hydrochlorothiazide (HYDRODIURIL) 12.5 MG tablet TAKE 1 TABLET BY MOUTH DAILY   • HYDROcodone-acetaminophen (NORCO) 7.5-325 MG per tablet Take 1 tablet by mouth Every 4 (Four) Hours As Needed for Moderate Pain .   • lisinopril (PRINIVIL,ZESTRIL) 40 MG tablet TAKE 1 TABLET BY MOUTH DAILY   • MYRBETRIQ 50 MG " tablet sustained-release 24 hour 24 hr tablet TAKE 1 TABLET BY MOUTH DAILY   • omeprazole (priLOSEC) 40 MG capsule TAKE 1 CAPSULE BY MOUTH DAILY   • promethazine (PHENERGAN) 25 MG tablet Take 0.5 tablets by mouth Every 8 (Eight) Hours As Needed for Nausea or Vomiting.   • SUMAtriptan (IMITREX) 100 MG tablet TAKE ONE TABLET BY MOUTH AT ONSET OF HEADACHE; MAY REPEAT ONE TABLET IN 2 HOURS IF NEEDED *MAX 2 TABLETS IN 24 HOURS   • zolpidem (AMBIEN) 10 MG tablet TAKE 1 TABLET BY MOUTH AT NIGHT AS NEEDED FOR SLEEP     No current facility-administered medications on file prior to visit.      Current Medications:  Scheduled Meds:  Continuous Infusions:  No current facility-administered medications for this visit.   PRN Meds:.    Past Medical History:   Diagnosis Date   • Anemia    • Back pain    • Depression     Depression acute recurrent   • Encounter for annual health examination 01/29/2014    Annual Health Assessment   • Eustachian tube dysfunction     L Eustachian tube malfunction   • Head congestion    • Headache    • Hypertension    • Insomnia    • Left ear pain    • Wellness examination     Annual Wellness Visit: 10/20/15, 10/01/14        Past Surgical History:   Procedure Laterality Date   • COLONOSCOPY  01/19/2011   • HYSTERECTOMY          Social History     Occupational History   • Not on file   Tobacco Use   • Smoking status: Former Smoker   • Smokeless tobacco: Never Used   Substance and Sexual Activity   • Alcohol use: No   • Drug use: Yes     Types: Hydrocodone   • Sexual activity: No    Social History     Social History Narrative   • Not on file      History reviewed. No pertinent family history.          Review of Systems: 14 point review of systems are remarkable for the pertinent positives listed in the chart by the patient the remainder negative    Review of Systems      Physical Exam: 84 y.o. female  General Appearance:    Alert, cooperative, in no acute distress                 Vitals:    06/17/19 1425  "  Temp: 98.3 °F (36.8 °C)   TempSrc: Temporal   Weight: 52.5 kg (115 lb 12.8 oz)   Height: 158.8 cm (62.5\")      Patient is alert and read ×3 no acute distress appears her above-listed at height weight and age.  Affect is normal respiratory rate is normal unlabored. Heart rate regular rate rhythm, sclera, dentition and hearing are normal for the purpose of this exam.        Ortho Exam Physical exam of the right knee reveals no effusion, no erythema.  It mild loss of extension and full flexion  Patient has mild varus alignment.  They have mild tenderness to palpation about the medial compartment, no tenderness laterally..  The patient has a negative bounce home, negative Maribel and a stable ligamentous exam.  Quad tone is reasonable and symmetric.  There are no overlying skin changes no lymphedema no lymphadenopathy.  There is good hip range of motion which is full symmetric and asymptomatic and a normal ankle exam.      Large Joint Arthrocentesis: R knee  Date/Time: 6/17/2019 2:54 PM  Consent given by: patient  Site marked: site marked  Timeout: Immediately prior to procedure a time out was called to verify the correct patient, procedure, equipment, support staff and site/side marked as required   Supporting Documentation  Indications: pain   Procedure Details  Location: knee - R knee  Preparation: Patient was prepped and draped in the usual sterile fashion  Needle size: 25 G  Approach: anteromedial  Medications administered: 80 mg methylPREDNISolone acetate 80 MG/ML; 4 mL lidocaine (cardiac)  Patient tolerance: patient tolerated the procedure well with no immediate complications                   Radiology:   AP, Lateral and merchant views of the right knee  were ordered/reviewed to evauateknee pain.  Have no compared to films she has severe medial compartment OA with complete loss of joint space she has several small loose bodies posteriorly and has a large ossicle sitting superior to the patella as best seen on " the lateral probably an old osteophyte  Imaging Results (most recent)     Procedure Component Value Units Date/Time    XR Knee 3 View Right [[id number]] Resulted:  06/17/19 1409     Updated:  06/17/19 1409    Impression:       Ordering physician's impression is located in the Encounter Note dated 06/17/19. X-ray performed in the DR room.          Assessment/Plan: Severe right knee pain this is degenerative in origin we talked about options I think an injection is a great option although I think she really needs to talk to Dr. Hernandez about knee arthroplasty        .procdo

## 2019-06-18 RX ORDER — METHYLPREDNISOLONE ACETATE 80 MG/ML
80 INJECTION, SUSPENSION INTRA-ARTICULAR; INTRALESIONAL; INTRAMUSCULAR; SOFT TISSUE
Status: COMPLETED | OUTPATIENT
Start: 2019-06-17 | End: 2019-06-17

## 2019-06-26 DIAGNOSIS — I10 ESSENTIAL HYPERTENSION: ICD-10-CM

## 2019-06-26 RX ORDER — SUMATRIPTAN 100 MG/1
TABLET, FILM COATED ORAL
Qty: 12 TABLET | Refills: 0 | Status: SHIPPED | OUTPATIENT
Start: 2019-06-26 | End: 2019-09-03 | Stop reason: SDUPTHER

## 2019-06-26 RX ORDER — AMLODIPINE BESYLATE 10 MG/1
10 TABLET ORAL DAILY
Qty: 90 TABLET | Refills: 0 | Status: SHIPPED | OUTPATIENT
Start: 2019-06-26 | End: 2019-09-24 | Stop reason: SDUPTHER

## 2019-07-26 RX ORDER — LISINOPRIL 40 MG/1
TABLET ORAL
Qty: 90 TABLET | Refills: 0 | Status: SHIPPED | OUTPATIENT
Start: 2019-07-26 | End: 2019-10-24 | Stop reason: SDUPTHER

## 2019-08-29 ENCOUNTER — OFFICE VISIT (OUTPATIENT)
Dept: INTERNAL MEDICINE | Facility: CLINIC | Age: 84
End: 2019-08-29

## 2019-08-29 VITALS
TEMPERATURE: 97 F | HEIGHT: 63 IN | BODY MASS INDEX: 20.38 KG/M2 | SYSTOLIC BLOOD PRESSURE: 153 MMHG | WEIGHT: 115 LBS | HEART RATE: 62 BPM | OXYGEN SATURATION: 98 % | DIASTOLIC BLOOD PRESSURE: 77 MMHG

## 2019-08-29 DIAGNOSIS — K58.1 IRRITABLE BOWEL SYNDROME WITH CONSTIPATION: ICD-10-CM

## 2019-08-29 DIAGNOSIS — M50.90 CERVICAL DISC DISEASE: ICD-10-CM

## 2019-08-29 DIAGNOSIS — I10 ESSENTIAL HYPERTENSION: ICD-10-CM

## 2019-08-29 DIAGNOSIS — F51.01 PRIMARY INSOMNIA: Primary | ICD-10-CM

## 2019-08-29 PROCEDURE — 99213 OFFICE O/P EST LOW 20 MIN: CPT | Performed by: FAMILY MEDICINE

## 2019-08-29 RX ORDER — HYDROCODONE BITARTRATE AND ACETAMINOPHEN 7.5; 325 MG/1; MG/1
1 TABLET ORAL EVERY 4 HOURS PRN
Qty: 180 TABLET | Refills: 0 | Status: SHIPPED | OUTPATIENT
Start: 2019-08-29 | End: 2019-12-05 | Stop reason: SDUPTHER

## 2019-08-29 RX ORDER — ZOLPIDEM TARTRATE 10 MG/1
10 TABLET ORAL NIGHTLY PRN
Qty: 30 TABLET | Refills: 5 | Status: SHIPPED | OUTPATIENT
Start: 2019-08-29 | End: 2020-02-12 | Stop reason: SDUPTHER

## 2019-08-29 RX ORDER — DICYCLOMINE HCL 20 MG
20 TABLET ORAL EVERY 6 HOURS
Qty: 60 TABLET | Refills: 5 | Status: SHIPPED | OUTPATIENT
Start: 2019-08-29 | End: 2020-06-09 | Stop reason: SDUPTHER

## 2019-08-29 NOTE — PROGRESS NOTES
CC:edema,insomnia,pain,depression    Subjective.../HPI  Patient present today with1) knee arthritis-rare pain med- 180 last 5 mons  2) insomnia- Ambien 10 mg -ok  I have reviewed the patient's medical history in detail and updated the computerized patient record.  3) htn Veronica has a history of chronic hypertension and has been well controlled on current medications.  Patient reports has had hypertension for 20 years. She is tolerating medications without side effect. She reports no vision changes, headaches or lightheadedness. She is requesting refills of medications  4) depression- want to stef off Lexapro  5) IBS still takes  Several xs/week  Past Medical History:   Diagnosis Date   • Anemia    • Back pain    • Depression     Depression acute recurrent   • Encounter for annual health examination 01/29/2014    Annual Health Assessment   • Eustachian tube dysfunction     L Eustachian tube malfunction   • Head congestion    • Headache    • Hypertension    • Insomnia    • Left ear pain    • Wellness examination     Annual Wellness Visit: 10/20/15, 10/01/14       Past Surgical History:   Procedure Laterality Date   • COLONOSCOPY  01/19/2011   • HYSTERECTOMY         No family history on file.    Social History     Socioeconomic History   • Marital status:      Spouse name: Not on file   • Number of children: Not on file   • Years of education: Not on file   • Highest education level: Not on file   Tobacco Use   • Smoking status: Former Smoker   • Smokeless tobacco: Never Used   Substance and Sexual Activity   • Alcohol use: No   • Drug use: Yes     Types: Hydrocodone   • Sexual activity: No       Most Recent Immunizations   Administered Date(s) Administered   • Flu Mist 10/07/2017   • Flu Vaccine High Dose PF 65YR+ 10/12/2018   • Flu Vaccine Quad PF >18YRS 08/01/2014   • Influenza, Unspecified 08/30/2010   • Pneumococcal Conjugate 13-Valent (PCV13) 10/04/2016   • Pneumococcal Polysaccharide (PPSV23) 01/08/2019  "  • Pneumococcal, Unspecified 09/10/2011   • Tdap 10/01/2014   • Zostavax 01/01/2011       Review of Systems:   Review of Systems   Constitutional: Negative.    HENT: Negative.    Eyes: Negative.    Respiratory: Negative.    Cardiovascular: Negative.    Gastrointestinal: Negative.    Endocrine: Negative.    Genitourinary: Negative.    Musculoskeletal: Negative.    Skin: Negative.    Allergic/Immunologic: Negative.    Neurological: Negative.    Hematological: Negative.    Psychiatric/Behavioral: Negative.          Physical Exam   Constitutional: She is oriented to person, place, and time. She appears well-developed and well-nourished.   Cardiovascular: Normal rate, regular rhythm and normal heart sounds.   Pulmonary/Chest: Effort normal and breath sounds normal.   Neurological: She is alert and oriented to person, place, and time.   Psychiatric: She has a normal mood and affect. Her behavior is normal. Judgment and thought content normal.   Vitals reviewed.        Vital Signs     Vitals:    08/29/19 1729   BP: 153/77   BP Location: Left arm   Patient Position: Sitting   Cuff Size: Small Adult   Pulse: 62   Temp: 97 °F (36.1 °C)   TempSrc: Oral   SpO2: 98%   Weight: 52.2 kg (115 lb)   Height: 158.8 cm (62.5\")          Results Review:      REVIEWED AND DISCUSSED CLINICAL RESULTS WITH PATIENT      Requested Prescriptions     Signed Prescriptions Disp Refills   • zolpidem (AMBIEN) 10 MG tablet 30 tablet 5     Sig: Take 1 tablet by mouth At Night As Needed for Sleep.   • dicyclomine (BENTYL) 20 MG tablet 60 tablet 5     Sig: Take 1 tablet by mouth Every 6 (Six) Hours.   • HYDROcodone-acetaminophen (NORCO) 7.5-325 MG per tablet 180 tablet 0     Sig: Take 1 tablet by mouth Every 4 (Four) Hours As Needed for Moderate Pain .         Current Outpatient Medications:   •  amLODIPine (NORVASC) 10 MG tablet, TAKE 1 TABLET BY MOUTH DAILY, Disp: 90 tablet, Rfl: 0  •  dicyclomine (BENTYL) 20 MG tablet, Take 1 tablet by mouth Every 6 " (Six) Hours., Disp: 60 tablet, Rfl: 5  •  escitalopram (LEXAPRO) 20 MG tablet, TAKE 1 TABLET BY MOUTH DAILY, Disp: 30 tablet, Rfl: 0  •  ESTRING 2 MG vaginal ring, Insert 2 mg into the vagina Every 3 (Three) Months. follow package directions, Disp: 1 each, Rfl: 5  •  hydrochlorothiazide (HYDRODIURIL) 12.5 MG tablet, TAKE 1 TABLET BY MOUTH DAILY, Disp: 90 tablet, Rfl: 1  •  HYDROcodone-acetaminophen (NORCO) 7.5-325 MG per tablet, Take 1 tablet by mouth Every 4 (Four) Hours As Needed for Moderate Pain ., Disp: 180 tablet, Rfl: 0  •  lisinopril (PRINIVIL,ZESTRIL) 40 MG tablet, TAKE 1 TABLET BY MOUTH DAILY, Disp: 90 tablet, Rfl: 0  •  omeprazole (priLOSEC) 40 MG capsule, TAKE 1 CAPSULE BY MOUTH DAILY, Disp: 30 capsule, Rfl: 3  •  promethazine (PHENERGAN) 25 MG tablet, Take 0.5 tablets by mouth Every 8 (Eight) Hours As Needed for Nausea or Vomiting., Disp: 40 tablet, Rfl: 0  •  SUMAtriptan (IMITREX) 100 MG tablet, Take one tablet at onset of headache. May repeat dose one time in 2 hours if headache not relieved., Disp: 12 tablet, Rfl: 0  •  zolpidem (AMBIEN) 10 MG tablet, Take 1 tablet by mouth At Night As Needed for Sleep., Disp: 30 tablet, Rfl: 5  •  butalbital-aspirin-caffeine-codeine (FIORINAL WITH CODEINE) -02-30 MG capsule, Take 1 capsule by mouth Every 4 (Four) Hours As Needed for Headache., Disp: 100 capsule, Rfl: 0  •  cyclobenzaprine (FLEXERIL) 5 MG tablet, Take 1 tablet by mouth 2 (Two) Times a Day As Needed for Muscle Spasms., Disp: 30 tablet, Rfl: 0  •  MYRBETRIQ 50 MG tablet sustained-release 24 hour 24 hr tablet, TAKE 1 TABLET BY MOUTH DAILY, Disp: 30 tablet, Rfl: 0    Procedures          Diagnoses and all orders for this visit:    Primary insomnia  -     zolpidem (AMBIEN) 10 MG tablet; Take 1 tablet by mouth At Night As Needed for Sleep.    Essential hypertension    Irritable bowel syndrome with constipation  -     dicyclomine (BENTYL) 20 MG tablet; Take 1 tablet by mouth Every 6 (Six)  Hours.    Cervical disc disease  -     HYDROcodone-acetaminophen (NORCO) 7.5-325 MG per tablet; Take 1 tablet by mouth Every 4 (Four) Hours As Needed for Moderate Pain .        There are no Patient Instructions on file for this visit.     No Follow-up on file.    Philip Fuller M.D  08/29/19  7:01 PM

## 2019-09-03 RX ORDER — SUMATRIPTAN 100 MG/1
TABLET, FILM COATED ORAL
Qty: 12 TABLET | Refills: 0 | Status: SHIPPED | OUTPATIENT
Start: 2019-09-03 | End: 2019-10-16 | Stop reason: SDUPTHER

## 2019-09-23 ENCOUNTER — CLINICAL SUPPORT (OUTPATIENT)
Dept: ORTHOPEDIC SURGERY | Facility: CLINIC | Age: 84
End: 2019-09-23

## 2019-09-23 VITALS — WEIGHT: 115 LBS | HEIGHT: 63 IN | BODY MASS INDEX: 20.38 KG/M2

## 2019-09-23 DIAGNOSIS — M19.90 ARTHRITIS: Primary | ICD-10-CM

## 2019-09-23 PROCEDURE — 20610 DRAIN/INJ JOINT/BURSA W/O US: CPT | Performed by: ORTHOPAEDIC SURGERY

## 2019-09-23 PROCEDURE — 99212 OFFICE O/P EST SF 10 MIN: CPT | Performed by: ORTHOPAEDIC SURGERY

## 2019-09-23 RX ADMIN — METHYLPREDNISOLONE ACETATE 80 MG: 80 INJECTION, SUSPENSION INTRA-ARTICULAR; INTRALESIONAL; INTRAMUSCULAR; SOFT TISSUE at 09:52

## 2019-09-23 NOTE — PROGRESS NOTES
Patient: Veronica Babb  YOB: 1935  Date of Service: 9/23/2019    Chief Complaints:   Chief Complaint   Patient presents with   • Right Knee - Follow-up, Pain   Right knee pain  Subjective:    History of Present Illness: Pt is seen in the office today with complaints of Chief Complaint   Patient presents with   • Right Knee - Follow-up, Pain   .    Patient is here follow right knee pain she did get injections through 4 months ago did well until recently symptoms have returned she did have some degenerative changes mostly patellofemoral last visit she been working on some strengthening she does have some swelling today in both ankles and feet she states that her baseline does seem a little worse to me no dedicated calf pain or tenderness      Allergies: No Known Allergies    Medications:   Home Medications:  Current Outpatient Medications on File Prior to Visit   Medication Sig   • amLODIPine (NORVASC) 10 MG tablet TAKE 1 TABLET BY MOUTH DAILY   • butalbital-aspirin-caffeine-codeine (FIORINAL WITH CODEINE) -85-30 MG capsule Take 1 capsule by mouth Every 4 (Four) Hours As Needed for Headache.   • cyclobenzaprine (FLEXERIL) 5 MG tablet Take 1 tablet by mouth 2 (Two) Times a Day As Needed for Muscle Spasms.   • dicyclomine (BENTYL) 20 MG tablet Take 1 tablet by mouth Every 6 (Six) Hours.   • escitalopram (LEXAPRO) 20 MG tablet TAKE 1 TABLET BY MOUTH DAILY   • ESTRING 2 MG vaginal ring Insert 2 mg into the vagina Every 3 (Three) Months. follow package directions   • hydrochlorothiazide (HYDRODIURIL) 12.5 MG tablet TAKE 1 TABLET BY MOUTH DAILY   • HYDROcodone-acetaminophen (NORCO) 7.5-325 MG per tablet Take 1 tablet by mouth Every 4 (Four) Hours As Needed for Moderate Pain .   • lisinopril (PRINIVIL,ZESTRIL) 40 MG tablet TAKE 1 TABLET BY MOUTH DAILY   • MYRBETRIQ 50 MG tablet sustained-release 24 hour 24 hr tablet TAKE 1 TABLET BY MOUTH DAILY   • omeprazole (priLOSEC) 40 MG capsule TAKE 1 CAPSULE  BY MOUTH DAILY   • promethazine (PHENERGAN) 25 MG tablet Take 0.5 tablets by mouth Every 8 (Eight) Hours As Needed for Nausea or Vomiting.   • SUMAtriptan (IMITREX) 100 MG tablet TAKE ONE TABLET BY MOUTH AT ONSET OF HEADACHE; MAY REPEAT ONE TABLET IN 2 HOURS IF NEEDED *MAX 2 TABLETS IN 24 HOURS   • zolpidem (AMBIEN) 10 MG tablet Take 1 tablet by mouth At Night As Needed for Sleep.     No current facility-administered medications on file prior to visit.      Current Medications:  Scheduled Meds:  Continuous Infusions:  No current facility-administered medications for this visit.   PRN Meds:.    I have reviewed the patient's medical history in detail and updated the computerized patient record.  Review and summarization of old records include:    Past Medical History:   Diagnosis Date   • Anemia    • Back pain    • Depression     Depression acute recurrent   • Encounter for annual health examination 01/29/2014    Annual Health Assessment   • Eustachian tube dysfunction     L Eustachian tube malfunction   • Head congestion    • Headache    • Hypertension    • Insomnia    • Left ear pain    • Wellness examination     Annual Wellness Visit: 10/20/15, 10/01/14        Past Surgical History:   Procedure Laterality Date   • COLONOSCOPY  01/19/2011   • HYSTERECTOMY          Social History     Occupational History   • Not on file   Tobacco Use   • Smoking status: Former Smoker   • Smokeless tobacco: Never Used   Substance and Sexual Activity   • Alcohol use: No   • Drug use: Yes     Types: Hydrocodone   • Sexual activity: No    Social History     Social History Narrative   • Not on file      No family history on file.    ROS: 14 point review of systems was performed and was negative except for documented findings in HPI and today's encounter.     Allergies: No Known Allergies  Constitutional:  Denies fever, shaking or chills   Eyes:  Denies change in visual acuity   HENT:  Denies nasal congestion or sore throat   Respiratory:   Denies cough or shortness of breath   Cardiovascular:  Denies chest pain or severe LE edema   GI:  Denies abdominal pain, nausea, vomiting, bloody stools or diarrhea   Musculoskeletal:  Numbness, tingling, or loss of motor function only as noted above in history of present illness.  : Denies painful urination or hematuria  Integument:  Denies rash, lesion or ulceration   Neurologic:  Denies headache or focal weakness  Endocrine:  Denies lymphadenopathy  Psych:  Denies confusion or change in mental status   Hem:  Denies active bleeding      Physical Exam: 84 y.o. female  Wt Readings from Last 3 Encounters:   08/29/19 52.2 kg (115 lb)   06/17/19 52.5 kg (115 lb 12.8 oz)   05/13/19 53.5 kg (118 lb)       There is no height or weight on file to calculate BMI.  No height and weight on file for this encounter.  There were no vitals filed for this visit.  Vital signs reviewed.   General Appearance:    Alert, cooperative, in no acute distress                  Eyes: conjunctiva clear  ENT: external ears and nose atraumatic  CV: no peripheral edema  Resp: normal respiratory effort  Skin: no rashes or wounds; normal turgor  Psych: mood and affect appropriate  Lymph: no nodes appreciated  Neuro: gross sensation intact  Vascular:  Palpable peripheral pulse in noted extremity    Ortho exam    Physical exam of the right knee reveals no effusion, no erythema.  It mild loss of extension and full flexion  Patient has mild varus alignment.  They have mild tenderness to palpation about the medial compartment, no tenderness laterally..  The patient has a negative bounce home, negative Maribel and a stable ligamentous exam.  Quad tone is reasonable and symmetric.  There are no overlying skin changes she does have some swelling in the both ankles.  There is good hip range of motion which is full symmetric and asymptomatic and a normal ankle exam.               Assessment: Right knee pain this is degenerative in origin I am a little  concerned about her ankle swelling she states is her baseline her primary care has seen this I would like that to get her in some NAIF hose which we will fit her with today.    Plan:   Follow up as indicated.  Ice, elevate, and rest as needed.  Discussed conservative measures of pain control including ice, bracing.  Also talked about the importance of strengthening and maintaining ideal body weight    Monica Barriga M.D.        Large Joint Arthrocentesis: R knee  Date/Time: 9/23/2019 9:52 AM  Consent given by: patient  Site marked: site marked  Timeout: Immediately prior to procedure a time out was called to verify the correct patient, procedure, equipment, support staff and site/side marked as required   Supporting Documentation  Indications: pain   Procedure Details  Location: knee - R knee  Preparation: Patient was prepped and draped in the usual sterile fashion  Needle size: 22 G  Approach: anteromedial  Medications administered: 4 mL lidocaine (cardiac); 80 mg methylPREDNISolone acetate 80 MG/ML  Patient tolerance: patient tolerated the procedure well with no immediate complications

## 2019-09-24 DIAGNOSIS — I10 ESSENTIAL HYPERTENSION: ICD-10-CM

## 2019-09-24 RX ORDER — METHYLPREDNISOLONE ACETATE 80 MG/ML
80 INJECTION, SUSPENSION INTRA-ARTICULAR; INTRALESIONAL; INTRAMUSCULAR; SOFT TISSUE
Status: COMPLETED | OUTPATIENT
Start: 2019-09-23 | End: 2019-09-23

## 2019-09-24 RX ORDER — HYDROCHLOROTHIAZIDE 12.5 MG/1
12.5 TABLET ORAL DAILY
Qty: 90 TABLET | Refills: 1 | Status: SHIPPED | OUTPATIENT
Start: 2019-09-24 | End: 2021-06-24

## 2019-09-24 RX ORDER — AMLODIPINE BESYLATE 10 MG/1
10 TABLET ORAL DAILY
Qty: 90 TABLET | Refills: 1 | Status: SHIPPED | OUTPATIENT
Start: 2019-09-24 | End: 2020-07-14 | Stop reason: SDUPTHER

## 2019-10-14 ENCOUNTER — APPOINTMENT (OUTPATIENT)
Dept: CARDIOLOGY | Facility: HOSPITAL | Age: 84
End: 2019-10-14

## 2019-10-14 ENCOUNTER — HOSPITAL ENCOUNTER (EMERGENCY)
Facility: HOSPITAL | Age: 84
Discharge: HOME | End: 2019-10-14
Attending: EMERGENCY MEDICINE | Admitting: EMERGENCY MEDICINE

## 2019-10-14 VITALS
DIASTOLIC BLOOD PRESSURE: 69 MMHG | RESPIRATION RATE: 16 BRPM | OXYGEN SATURATION: 98 % | HEART RATE: 68 BPM | WEIGHT: 114 LBS | SYSTOLIC BLOOD PRESSURE: 137 MMHG | BODY MASS INDEX: 20.2 KG/M2 | TEMPERATURE: 97.3 F | HEIGHT: 63 IN

## 2019-10-14 DIAGNOSIS — R00.2 PALPITATIONS: ICD-10-CM

## 2019-10-14 DIAGNOSIS — F41.1 ANXIETY, GENERALIZED: ICD-10-CM

## 2019-10-14 DIAGNOSIS — I49.3 PVCS (PREMATURE VENTRICULAR CONTRACTIONS): ICD-10-CM

## 2019-10-14 DIAGNOSIS — M79.10 MYALGIA: Primary | ICD-10-CM

## 2019-10-14 DIAGNOSIS — E87.1 HYPONATREMIA: ICD-10-CM

## 2019-10-14 LAB
ALBUMIN SERPL-MCNC: 4.6 G/DL (ref 3.5–5.2)
ALBUMIN/GLOB SERPL: 1.6 G/DL
ALP SERPL-CCNC: 74 U/L (ref 39–117)
ALT SERPL W P-5'-P-CCNC: 8 U/L (ref 1–33)
ANION GAP SERPL CALCULATED.3IONS-SCNC: 11.2 MMOL/L (ref 5–15)
AST SERPL-CCNC: 13 U/L (ref 1–32)
BASOPHILS # BLD AUTO: 0.04 10*3/MM3 (ref 0–0.2)
BASOPHILS NFR BLD AUTO: 0.9 % (ref 0–1.5)
BILIRUB SERPL-MCNC: 0.3 MG/DL (ref 0.2–1.2)
BUN BLD-MCNC: 9 MG/DL (ref 8–23)
BUN/CREAT SERPL: 13 (ref 7–25)
CALCIUM SPEC-SCNC: 9.2 MG/DL (ref 8.6–10.5)
CHLORIDE SERPL-SCNC: 90 MMOL/L (ref 98–107)
CO2 SERPL-SCNC: 25.8 MMOL/L (ref 22–29)
CREAT BLD-MCNC: 0.69 MG/DL (ref 0.57–1)
DEPRECATED RDW RBC AUTO: 41.6 FL (ref 37–54)
EOSINOPHIL # BLD AUTO: 0.04 10*3/MM3 (ref 0–0.4)
EOSINOPHIL NFR BLD AUTO: 0.9 % (ref 0.3–6.2)
ERYTHROCYTE [DISTWIDTH] IN BLOOD BY AUTOMATED COUNT: 12.7 % (ref 12.3–15.4)
GFR SERPL CREATININE-BSD FRML MDRD: 81 ML/MIN/1.73
GLOBULIN UR ELPH-MCNC: 2.8 GM/DL
GLUCOSE BLD-MCNC: 157 MG/DL (ref 65–99)
HCT VFR BLD AUTO: 35 % (ref 34–46.6)
HGB BLD-MCNC: 11.6 G/DL (ref 12–15.9)
HOLD SPECIMEN: NORMAL
HOLD SPECIMEN: NORMAL
IMM GRANULOCYTES # BLD AUTO: 0.01 10*3/MM3 (ref 0–0.05)
IMM GRANULOCYTES NFR BLD AUTO: 0.2 % (ref 0–0.5)
LYMPHOCYTES # BLD AUTO: 0.54 10*3/MM3 (ref 0.7–3.1)
LYMPHOCYTES NFR BLD AUTO: 11.6 % (ref 19.6–45.3)
MAGNESIUM SERPL-MCNC: 1.8 MG/DL (ref 1.6–2.4)
MCH RBC QN AUTO: 30.1 PG (ref 26.6–33)
MCHC RBC AUTO-ENTMCNC: 33.1 G/DL (ref 31.5–35.7)
MCV RBC AUTO: 90.7 FL (ref 79–97)
MONOCYTES # BLD AUTO: 0.28 10*3/MM3 (ref 0.1–0.9)
MONOCYTES NFR BLD AUTO: 6 % (ref 5–12)
NEUTROPHILS # BLD AUTO: 3.73 10*3/MM3 (ref 1.7–7)
NEUTROPHILS NFR BLD AUTO: 80.4 % (ref 42.7–76)
NRBC BLD AUTO-RTO: 0 /100 WBC (ref 0–0.2)
PLATELET # BLD AUTO: 218 10*3/MM3 (ref 140–450)
PMV BLD AUTO: 10.7 FL (ref 6–12)
POTASSIUM BLD-SCNC: 3.8 MMOL/L (ref 3.5–5.2)
PROT SERPL-MCNC: 7.4 G/DL (ref 6–8.5)
RBC # BLD AUTO: 3.86 10*6/MM3 (ref 3.77–5.28)
SODIUM BLD-SCNC: 127 MMOL/L (ref 136–145)
TROPONIN T SERPL-MCNC: <0.01 NG/ML (ref 0–0.03)
WBC NRBC COR # BLD: 4.64 10*3/MM3 (ref 3.4–10.8)
WHOLE BLOOD HOLD SPECIMEN: NORMAL
WHOLE BLOOD HOLD SPECIMEN: NORMAL

## 2019-10-14 PROCEDURE — 93225 XTRNL ECG REC<48 HRS REC: CPT

## 2019-10-14 PROCEDURE — 83735 ASSAY OF MAGNESIUM: CPT | Performed by: EMERGENCY MEDICINE

## 2019-10-14 PROCEDURE — 93005 ELECTROCARDIOGRAM TRACING: CPT | Performed by: EMERGENCY MEDICINE

## 2019-10-14 PROCEDURE — 93005 ELECTROCARDIOGRAM TRACING: CPT

## 2019-10-14 PROCEDURE — 80053 COMPREHEN METABOLIC PANEL: CPT | Performed by: EMERGENCY MEDICINE

## 2019-10-14 PROCEDURE — 93010 ELECTROCARDIOGRAM REPORT: CPT | Performed by: INTERNAL MEDICINE

## 2019-10-14 PROCEDURE — 85025 COMPLETE CBC W/AUTO DIFF WBC: CPT | Performed by: EMERGENCY MEDICINE

## 2019-10-14 PROCEDURE — 99284 EMERGENCY DEPT VISIT MOD MDM: CPT

## 2019-10-14 PROCEDURE — 84484 ASSAY OF TROPONIN QUANT: CPT | Performed by: EMERGENCY MEDICINE

## 2019-10-14 PROCEDURE — 93226 XTRNL ECG REC<48 HR SCAN A/R: CPT

## 2019-10-14 RX ORDER — SODIUM CHLORIDE 0.9 % (FLUSH) 0.9 %
10 SYRINGE (ML) INJECTION AS NEEDED
Status: DISCONTINUED | OUTPATIENT
Start: 2019-10-14 | End: 2019-10-14 | Stop reason: HOSPADM

## 2019-10-14 NOTE — ED PROVIDER NOTES
" EMERGENCY DEPARTMENT ENCOUNTER    CHIEF COMPLAINT  Chief Complaint: \"cramps\" to bilateral hands and feet  History given by: patient  History limited by: none  Room Number: 43/43  PMD: Philip Fuller MD      HPI:  Pt is a 84 y.o. female who presents complaining of intermittent \"cramps\" to bilateral hands and legs for the last few weeks occurring for 12 minutes and improved by moving. Pt also complains bilateral hand weakness and tingling (worse in left arm, occurring during episodes and feeling like hand was \"closing up\") and intermittent heart palpitations (occurring for the last few weeks, lasting very briefly) but denies fever, cough and cold sx. patient has a lot of anxiety.  She describes the weakness as if she gets a cramp in her left and right hand almost consistent with a carpopedal spasm.  She then relaxes and flexes and extends fingers in the cramp resolves over several minutes.  Pt reports during episodes of cramps becoming nervous. Pt reports recently stopping Cytalopram.   Patient denies any chest pain or shortness of breath.  The palpitations occur instantaneously and are very brief just 1 second or last.    Duration:  A few weeks  Onset: gradual  Timing: intermittent  Location: extremities  Quality: \"cramps\"  Intensity/Severity: moderate  Associated Symptoms: hands feel like they're closing  Aggravating Factors: none    PAST MEDICAL HISTORY  Active Ambulatory Problems     Diagnosis Date Noted   • Primary insomnia 10/04/2016   • Lumbar disc disease 10/04/2016   • Trapezius muscle spasm 12/14/2016   • Knee injury 07/23/2017   • Primary osteoarthritis of right knee 07/23/2017   • Status post total hip replacement, right 07/23/2017   • Essential hypertension 11/01/2017   • Gastroesophageal reflux disease with esophagitis 03/27/2018   • Migraine without aura 07/17/2018   • Overactive bladder 07/17/2018   • Cervical disc disease 10/23/2018   • Essential hypertension 08/29/2019   • Irritable bowel " "syndrome with constipation 08/29/2019     Resolved Ambulatory Problems     Diagnosis Date Noted   • No Resolved Ambulatory Problems     Past Medical History:   Diagnosis Date   • Anemia    • Back pain    • Depression    • Encounter for annual health examination 01/29/2014   • Eustachian tube dysfunction    • Head congestion    • Headache    • Hypertension    • Insomnia    • Left ear pain    • Wellness examination        PAST SURGICAL HISTORY  Past Surgical History:   Procedure Laterality Date   • COLONOSCOPY  01/19/2011   • HYSTERECTOMY         FAMILY HISTORY  History reviewed. No pertinent family history.    SOCIAL HISTORY  Social History     Socioeconomic History   • Marital status:      Spouse name: Not on file   • Number of children: Not on file   • Years of education: Not on file   • Highest education level: Not on file   Tobacco Use   • Smoking status: Former Smoker   • Smokeless tobacco: Never Used   Substance and Sexual Activity   • Alcohol use: No   • Drug use: Yes     Types: Hydrocodone   • Sexual activity: No       ALLERGIES  Patient has no known allergies.    REVIEW OF SYSTEMS  Review of Systems   Constitutional: Negative for fever.   HENT: Negative for sore throat.    Eyes: Negative.    Respiratory: Negative for cough and shortness of breath.    Cardiovascular: Positive for palpitations. Negative for chest pain.   Gastrointestinal: Negative for abdominal pain, diarrhea and vomiting.   Genitourinary: Negative for dysuria.   Musculoskeletal: Positive for myalgias (\"cramps\" to extremities). Negative for neck pain.   Skin: Negative for rash.   Allergic/Immunologic: Negative.    Neurological: Positive for weakness (bilateral, feels like \"hands closing up\"). Negative for numbness and headaches.   Hematological: Negative.    Psychiatric/Behavioral: Negative.    All other systems reviewed and are negative.      PHYSICAL EXAM  ED Triage Vitals   Temp Heart Rate Resp BP SpO2   10/14/19 1445 10/14/19 1445 " 10/14/19 1445 10/14/19 1626 10/14/19 1445   97.3 °F (36.3 °C) 90 16 136/73 93 %      Temp src Heart Rate Source Patient Position BP Location FiO2 (%)   10/14/19 1445 10/14/19 1445 -- -- --   Tympanic Monitor          Physical Exam   Constitutional: She is oriented to person, place, and time. No distress.   HENT:   Head: Normocephalic and atraumatic.   Right Ear: Tympanic membrane and ear canal normal.   Left Ear: Tympanic membrane and ear canal normal.   Eyes: EOM are normal. Pupils are equal, round, and reactive to light.   Neck: Normal range of motion. Neck supple.   Cardiovascular: Normal rate, regular rhythm and normal heart sounds.   PVC seen on monitor correlates with palpitations   Pulmonary/Chest: Effort normal and breath sounds normal. No respiratory distress.   O2 100% on RA   Abdominal: Soft. There is no tenderness. There is no rebound and no guarding.   Musculoskeletal: Normal range of motion. She exhibits no edema.   Neurological: She is alert and oriented to person, place, and time. She has normal sensation and normal strength.   Skin: Skin is warm and dry. No rash noted.   Psychiatric: Mood and affect normal.   Nursing note and vitals reviewed.    LAB RESULTS  Lab Results (last 24 hours)     Procedure Component Value Units Date/Time    CBC & Differential [[id number]] Collected:  10/14/19 1801    Specimen:  Blood Updated:  10/14/19 1958    Narrative:       The following orders were created for panel order CBC & Differential.  Procedure                               Abnormality         Status                     ---------                               -----------         ------                     CBC Auto Differential[[id number]]        Abnormal            Final result                 Please view results for these tests on the individual orders.    Comprehensive Metabolic Panel [[id number]]  (Abnormal) Collected:  10/14/19 1801    Specimen:  Blood Updated:  10/14/19 2010     Glucose 157 mg/dL      BUN 9  mg/dL      Creatinine 0.69 mg/dL      Sodium 127 mmol/L      Potassium 3.8 mmol/L      Chloride 90 mmol/L      CO2 25.8 mmol/L      Calcium 9.2 mg/dL      Total Protein 7.4 g/dL      Albumin 4.60 g/dL      ALT (SGPT) 8 U/L      AST (SGOT) 13 U/L      Alkaline Phosphatase 74 U/L      Total Bilirubin 0.3 mg/dL      eGFR Non African Amer 81 mL/min/1.73      Globulin 2.8 gm/dL      A/G Ratio 1.6 g/dL      BUN/Creatinine Ratio 13.0     Anion Gap 11.2 mmol/L     Narrative:       GFR Normal >60  Chronic Kidney Disease <60  Kidney Failure <15    Magnesium [[id number]]  (Normal) Collected:  10/14/19 1801    Specimen:  Blood Updated:  10/14/19 2010     Magnesium 1.8 mg/dL     Troponin [[id number]]  (Normal) Collected:  10/14/19 1801    Specimen:  Blood Updated:  10/14/19 2010     Troponin T <0.010 ng/mL     Narrative:       Troponin T Reference Range:  <= 0.03 ng/mL-   Negative for AMI  >0.03 ng/mL-     Abnormal for myocardial necrosis.  Clinicians would have to utilize clinical acumen, EKG, Troponin and serial changes to determine if it is an Acute Myocardial Infarction or myocardial injury due to an underlying chronic condition.     CBC Auto Differential [[id number]]  (Abnormal) Collected:  10/14/19 1801    Specimen:  Blood Updated:  10/14/19 1958     WBC 4.64 10*3/mm3      RBC 3.86 10*6/mm3      Hemoglobin 11.6 g/dL      Hematocrit 35.0 %      MCV 90.7 fL      MCH 30.1 pg      MCHC 33.1 g/dL      RDW 12.7 %      RDW-SD 41.6 fl      MPV 10.7 fL      Platelets 218 10*3/mm3      Neutrophil % 80.4 %      Lymphocyte % 11.6 %      Monocyte % 6.0 %      Eosinophil % 0.9 %      Basophil % 0.9 %      Immature Grans % 0.2 %      Neutrophils, Absolute 3.73 10*3/mm3      Lymphocytes, Absolute 0.54 10*3/mm3      Monocytes, Absolute 0.28 10*3/mm3      Eosinophils, Absolute 0.04 10*3/mm3      Basophils, Absolute 0.04 10*3/mm3      Immature Grans, Absolute 0.01 10*3/mm3      nRBC 0.0 /100 WBC           I ordered the above labs and reviewed  the results        PROCEDURES  Procedures    EKG          EKG time: 1452  Rhythm/Rate: NSR 83  P waves and MO: intraventricular conduction delay  QRS, axis: left axis deviation   ST and T waves: diffuse nonspecific ST and R wave changes, normal QT     Interpreted Contemporaneously by me, independently viewed  No prior to compare      PROGRESS AND CONSULTS  ED Course as of Oct 14 2046   Mon Oct 14, 2019   2018 8:18 PM  I seen and talk with the patient and family member about the results of the test.  Patient is doing very well and in no distress.  She is remained asymptomatic in the emergency department.  On the monitor I correlated her palpitations with PVCs and that is very likely the etiology of the brief skipped beats that she has.  I did place a Holter monitor and will have her follow-up with cardiology.I also informed her and her family member about her sodium is mildly low at 127.  I instructed her to follow-up with Dr. Fuller later this week for repeat sodium level.  All questions were answered patient and family agree with this plan.  [MM]      ED Course User Index  [MM] Rakesh Martinez MD     1448 ordered EKG for further evaluation    1819 ordered Holter monitor due to palpitations    1948 ordered labs for further evaluation      MEDICAL DECISION MAKING  Results were reviewed/discussed with the patient and they were also made aware of online access. Pt also made aware that some labs, such as cultures, will not be resulted during ER visit and follow up with PMD is necessary.     MDM  Number of Diagnoses or Management Options     Amount and/or Complexity of Data Reviewed  Clinical lab tests: ordered and reviewed (Trop negative  Mag 1.8  Creatinine 0.69)  Tests in the medicine section of CPT®: ordered and reviewed (See procedure note for EKG results)  Review and summarize past medical records: yes           DIAGNOSIS  Final diagnoses:   Myalgia   Palpitations   PVCs (premature ventricular contractions)    Anxiety, generalized   Hyponatremia       DISPOSITION  DISCHARGE    Patient discharged in stable condition.    Reviewed implications of results, diagnosis, meds, responsibility to follow up, warning signs and symptoms of possible worsening, potential complications and reasons to return to ER, including new or worsening sx.    Patient/Family voiced understanding of above instructions.    Discussed plan for discharge, as there is no emergent indication for admission. Patient referred to primary care provider for BP management due to today's BP. Pt/family is agreeable and understands need for follow up and repeat testing.  Pt is aware that discharge does not mean that nothing is wrong but it indicates no emergency is present that requires admission and they must continue care with follow-up as given below or physician of their choice.     FOLLOW-UP  Philip Fuller MD  [address]  [place] [address]  [phone]    In 3 days  Return if any chest pain, shortness of breath, fever, any concerns    Neymar Mann MD  [address]  [place] [id number] [phone]      Call tomorrow to arrange follow-up later this week or early next week.  Return if any chest pain, If symptoms worsen, shortness of breath, fever, any concerns         Medication List      No changes were made to your prescriptions during this visit.             Latest Documented Vital Signs:  As of 8:46 PM  BP- 137/69 HR- 68 Temp- 97.3 °F (36.3 °C) (Tympanic) O2 sat- 98%    --  Documentation assistance provided by tashia Pennington for Dr. Martinez.  Information recorded by the scribe was done at my direction and has been verified and validated by me.                           Faby Pennington  10/14/19 2022       Rakesh Martinez MD  10/14/19 2046

## 2019-10-15 NOTE — DISCHARGE INSTRUCTIONS
As we discussed follow-up with Dr. Fuller in 3 to 4 days for repeat sodium level.  Keep Holter on as instructed by cardiology and follow-up with cardiologist above in the next 1 to 2 weeks.

## 2019-10-16 ENCOUNTER — TELEPHONE (OUTPATIENT)
Dept: INTERNAL MEDICINE | Facility: CLINIC | Age: 84
End: 2019-10-16

## 2019-10-16 RX ORDER — SUMATRIPTAN 100 MG/1
TABLET, FILM COATED ORAL
Qty: 12 TABLET | Refills: 0 | Status: SHIPPED | OUTPATIENT
Start: 2019-10-16 | End: 2019-12-09 | Stop reason: SDUPTHER

## 2019-10-16 NOTE — TELEPHONE ENCOUNTER
Pt called states would like for Dr. Fuller to give her a call. She stated was seen in the ER at [facility] on Monday for anxiety attacks and numbness in hands. She states has been having the numbness in hands off and on for 3 weeks.     Pt stated they put her on a heart monitor per pt.      Please call pt @ [phone]

## 2019-10-23 ENCOUNTER — OFFICE VISIT (OUTPATIENT)
Dept: INTERNAL MEDICINE | Facility: CLINIC | Age: 84
End: 2019-10-23

## 2019-10-23 VITALS
OXYGEN SATURATION: 97 % | BODY MASS INDEX: 19.84 KG/M2 | DIASTOLIC BLOOD PRESSURE: 75 MMHG | HEART RATE: 97 BPM | TEMPERATURE: 98.2 F | SYSTOLIC BLOOD PRESSURE: 130 MMHG | WEIGHT: 112 LBS | HEIGHT: 63 IN

## 2019-10-23 DIAGNOSIS — F32.9 REACTIVE DEPRESSION: ICD-10-CM

## 2019-10-23 DIAGNOSIS — E87.1 HYPONATREMIA: ICD-10-CM

## 2019-10-23 DIAGNOSIS — F51.01 PRIMARY INSOMNIA: Primary | ICD-10-CM

## 2019-10-23 DIAGNOSIS — G43.019 INTRACTABLE MIGRAINE WITHOUT AURA AND WITHOUT STATUS MIGRAINOSUS: ICD-10-CM

## 2019-10-23 PROCEDURE — 99214 OFFICE O/P EST MOD 30 MIN: CPT | Performed by: NURSE PRACTITIONER

## 2019-10-23 RX ORDER — CODEINE/BUTALBITAL/ASA/CAFFEIN 30-50-325
1 CAPSULE ORAL EVERY 4 HOURS PRN
Qty: 100 CAPSULE | Refills: 0 | Status: SHIPPED | OUTPATIENT
Start: 2019-10-23 | End: 2020-03-04

## 2019-10-23 RX ORDER — ESCITALOPRAM OXALATE 20 MG/1
20 TABLET ORAL DAILY
Qty: 90 TABLET | Refills: 0 | Status: SHIPPED | OUTPATIENT
Start: 2019-10-23 | End: 2022-03-15

## 2019-10-23 RX ORDER — ESCITALOPRAM OXALATE 20 MG/1
20 TABLET ORAL DAILY
Qty: 30 TABLET | Refills: 0 | Status: SHIPPED | OUTPATIENT
Start: 2019-10-23 | End: 2019-10-23 | Stop reason: SDUPTHER

## 2019-10-23 NOTE — PATIENT INSTRUCTIONS
October is Breast Cancer Awareness Month  Each year more than 245,000 women get breast cancer in the [place].  Breast cancer in men is less frequent but you should check yourself at regular intervals.    Monitor your breast for changes  • Any change in the size or the shape of the breast  • Pain in any area of the breast  • Nipple discharge other than breast milk (including blood)  • A new lump in the breast or underarm  *Continue regular scheduled mammograms    Diet:    • Eat vegetables, fruits, whole grain, low-fat dairy, poultry, fish, beans, nontropical vegetable oils, and nuts, but low amounts of red meat (i.e., Mediterranean-style diet, DASH [Dietary Approaches to Stop Hypertension] diet).  • Limit sugary drinks and sweets.  • Limit saturated and trans fat to 5% to 6% of calories.  • Limit sodium intake to 2,400 mg daily (about one teaspoon table salt [kosher/sea salt have less sodium per teaspoon]).  • Fad diets will come and go.  Studies show that the most effective diet is one that you can continue long term.     Weight loss / Calorie Counting Apps:    • Lose It!   • MyWeSwap.com Pal   • Works great when you try it with a partner/ friend    Exercise:   • Engage in moderate-to-vigorous aerobic activity for at least 40 minutes (on average) three to four times each week.    Wearables:   • Activity tracker   • Step tracker: getting 7,500 steps daily can cut your cardiac risks by 44%     Bone Health:   • [email or web address]://www.nof.org/patients/treatment/[email or web address]  • Routine weight bearing exercise    Vaccines:   • Flu vaccine every fall  • https://www.vaccinateyourfamily.org/

## 2019-10-23 NOTE — PROGRESS NOTES
Name: Veronica Babb  :  1935    Subjective:      Chief Complaint   Patient presents with   • Follow-up     c/c pt states follow up from hospital         Veronica Babb is a 84 y.o. female patient of Philip Fuller MD who is here for ER follow up from 10/12/19.      Patient with OA (had R knee injected on 19 with SUZANNE Barriga), hypertension, insomnia, depression (stopped lexapro per note on 19), Anemia (B-12), migraines.  She presented to the ER with intermittent hand and leg cramps and tingling to L arm. She also complained of palpitations.  Her troponin was normal. She had some PVCs and was discharged with a 48 hour holter monitor (results not yet back). Her sodium was low at 127.She denies N/V/D.       She states since being at the ER. She feels fine. States she has anxiety and not thinks that the lexapro was effective and would like to restart.  No chest pain, shortness of air, panic.     50 year old son recently moved back in and has increased her stress.   passed 10 years ago and she had been used to living alone.  Hasn't been going to the gym after it closed but states she will get a new one to do things for herself.      Insomnia is chronic and controlled on ambien. Safety precautions discussed.     Migraines several times a month. Controlled with fiorinal.  Needs refill today.      I have reviewed the patient's medical history in detail and updated the computerized patient record.    Past Medical History:   Diagnosis Date   • Anemia    • Back pain    • Depression     Depression acute recurrent   • Encounter for annual health examination 2014    Annual Health Assessment   • Eustachian tube dysfunction     L Eustachian tube malfunction   • Head congestion    • Headache    • Hypertension    • Insomnia    • Left ear pain    • Wellness examination     Annual Wellness Visit: 10/20/15, 10/01/14       Past Surgical History:   Procedure Laterality Date   • COLONOSCOPY   01/19/2011   • HYSTERECTOMY         History reviewed. No pertinent family history.    Social History     Socioeconomic History   • Marital status:      Spouse name: Not on file   • Number of children: Not on file   • Years of education: Not on file   • Highest education level: Not on file   Tobacco Use   • Smoking status: Former Smoker   • Smokeless tobacco: Never Used   Substance and Sexual Activity   • Alcohol use: No   • Drug use: Yes     Types: Hydrocodone   • Sexual activity: No       Most Recent Immunizations   Administered Date(s) Administered   • Flu Mist 10/07/2017   • Flu Vaccine High Dose PF 65YR+ 10/12/2018   • Flu Vaccine Intradermal Quad 18-64YR 09/01/2019   • Flu Vaccine Quad PF >18YRS 08/01/2014   • Influenza, Unspecified 08/30/2010   • Pneumococcal Conjugate 13-Valent (PCV13) 10/04/2016   • Pneumococcal Polysaccharide (PPSV23) 01/08/2019   • Pneumococcal, Unspecified 09/10/2011   • Tdap 10/01/2014   • Zostavax 01/01/2011         Review of Systems:   Review of Systems   Respiratory: Negative for shortness of breath.    Cardiovascular: Negative for chest pain, palpitations and leg swelling.   Endocrine: Negative for cold intolerance and heat intolerance.   Psychiatric/Behavioral: The patient is nervous/anxious.          Objective:      Physical Exam:   Physical Exam   Constitutional: She is oriented to person, place, and time. She appears well-developed. She is cooperative.   Has cane but not reliant on it    Eyes: Conjunctivae are normal.   Neck: Neck supple. No thyromegaly present.   Cardiovascular: Normal rate, regular rhythm, normal heart sounds, intact distal pulses and normal pulses.   No murmur heard.  Pulmonary/Chest: Effort normal and breath sounds normal. She exhibits no deformity.   Equal, Unlabored   Abdominal: Soft. Bowel sounds are normal.   Musculoskeletal: Normal range of motion. She exhibits no edema.   Neurological: She is alert and oriented to person, place, and time.   Skin:  "Capillary refill takes 2 to 3 seconds.   Psychiatric: She has a normal mood and affect.   Vitals reviewed.        Vital Signs:     Vitals:    10/23/19 1100   BP: 130/75   BP Location: Left arm   Patient Position: Sitting   Cuff Size: Small Adult   Pulse: 97   Temp: 98.2 °F (36.8 °C)   TempSrc: Oral   SpO2: 97%   Weight: 50.8 kg (112 lb)   Height: 160 cm (63\")          Results Review:      REVIEWED AND DISCUSSED LAB RESULTS WITH PATIENT  Lab Results   Component Value Date    GLUCOSE 157 (H) 10/14/2019    CALCIUM 9.2 10/14/2019     (L) 10/14/2019    K 3.8 10/14/2019    CO2 25.8 10/14/2019    CL 90 (L) 10/14/2019    BUN 9 10/14/2019    CREATININE 0.69 10/14/2019    EGFRIFAFRI 90 05/13/2019    EGFRIFNONA 81 10/14/2019    BCR 13.0 10/14/2019    ANIONGAP 11.2 10/14/2019       Requested Prescriptions     Signed Prescriptions Disp Refills   • butalbital-aspirin-caffeine-codeine (FIORINAL WITH CODEINE) -51-30 MG capsule 100 capsule 0     Sig: Take 1 capsule by mouth Every 4 (Four) Hours As Needed for Headache.   • escitalopram (LEXAPRO) 20 MG tablet 30 tablet 0     Sig: Take 1 tablet by mouth Daily.         Current Outpatient Medications:   •  amLODIPine (NORVASC) 10 MG tablet, TAKE 1 TABLET BY MOUTH DAILY, Disp: 90 tablet, Rfl: 1  •  butalbital-aspirin-caffeine-codeine (FIORINAL WITH CODEINE) -15-30 MG capsule, Take 1 capsule by mouth Every 4 (Four) Hours As Needed for Headache., Disp: 100 capsule, Rfl: 0  •  dicyclomine (BENTYL) 20 MG tablet, Take 1 tablet by mouth Every 6 (Six) Hours., Disp: 60 tablet, Rfl: 5  •  ESTRING 2 MG vaginal ring, Insert 2 mg into the vagina Every 3 (Three) Months. follow package directions, Disp: 1 each, Rfl: 5  •  hydroCHLOROthiazide (HYDRODIURIL) 12.5 MG tablet, TAKE 1 TABLET BY MOUTH DAILY, Disp: 90 tablet, Rfl: 1  •  HYDROcodone-acetaminophen (NORCO) 7.5-325 MG per tablet, Take 1 tablet by mouth Every 4 (Four) Hours As Needed for Moderate Pain ., Disp: 180 tablet, Rfl: 0  • "  lisinopril (PRINIVIL,ZESTRIL) 40 MG tablet, TAKE 1 TABLET BY MOUTH DAILY, Disp: 90 tablet, Rfl: 0  •  promethazine (PHENERGAN) 25 MG tablet, Take 0.5 tablets by mouth Every 8 (Eight) Hours As Needed for Nausea or Vomiting. (Patient taking differently: Take 12.5 mg by mouth As Needed for Nausea or Vomiting.), Disp: 40 tablet, Rfl: 0  •  SUMAtriptan (IMITREX) 100 MG tablet, TAKE ONE TABLET BY MOUTH AT ONSET OF HEADACHE; MAY REPEAT ONE TABLET IN 2 HOURS IF NEEDED *MAX 2 TABLETS IN 24 HOURS, Disp: 12 tablet, Rfl: 0  •  zolpidem (AMBIEN) 10 MG tablet, Take 1 tablet by mouth At Night As Needed for Sleep., Disp: 30 tablet, Rfl: 5  •  cyclobenzaprine (FLEXERIL) 5 MG tablet, Take 1 tablet by mouth 2 (Two) Times a Day As Needed for Muscle Spasms., Disp: 30 tablet, Rfl: 0  •  escitalopram (LEXAPRO) 20 MG tablet, Take 1 tablet by mouth Daily., Disp: 30 tablet, Rfl: 0  •  MYRBETRIQ 50 MG tablet sustained-release 24 hour 24 hr tablet, TAKE 1 TABLET BY MOUTH DAILY, Disp: 30 tablet, Rfl: 0  •  omeprazole (priLOSEC) 40 MG capsule, TAKE 1 CAPSULE BY MOUTH DAILY, Disp: 30 capsule, Rfl: 3       Assessment and Plan:        Problem List Items Addressed This Visit        Cardiovascular and Mediastinum    Migraine without aura     Headaches are improving with treatment.  Continue current treatment regimen.             Relevant Medications    butalbital-aspirin-caffeine-codeine (FIORINAL WITH CODEINE) -67-30 MG capsule    escitalopram (LEXAPRO) 20 MG tablet       Other    Hyponatremia    Relevant Orders    Basic Metabolic Panel    Primary insomnia - Primary     Stable on ambien          Reactive depression     Worse off lexapro  Will restart and monitor sodium          Relevant Medications    escitalopram (LEXAPRO) 20 MG tablet          Already had flu vaccine this year.      The patient has read and signed the [facility] Controlled Substance Contract.  I will continue to see patient for regular follow up appointments.  They are  "well controlled on their medication.  GERALD is updated every 3 months. The patient is aware of the potential for addiction and dependence.    Discussed any change in Rx and discussed visit with patient.  All questions answered.      Return for Next scheduled follow up.    Kashmir \"Eliazar\" Justo, ANGELIA   10/23/19    Dragon disclaimer:   Much of this encounter note is an electronic transcription/translation of spoken language to printed text. The electronic translation of spoken language may permit erroneous, or at times, nonsensical words or phrases to be inadvertently transcribed; Although I have reviewed the note for such errors, some may still exist.     Additional Patient Counseling:       Patient Instructions       October is Breast Cancer Awareness Month  Each year more than 245,000 women get breast cancer in the [place].  Breast cancer in men is less frequent but you should check yourself at regular intervals.    Monitor your breast for changes  • Any change in the size or the shape of the breast  • Pain in any area of the breast  • Nipple discharge other than breast milk (including blood)  • A new lump in the breast or underarm  *Continue regular scheduled mammograms    Diet:    • Eat vegetables, fruits, whole grain, low-fat dairy, poultry, fish, beans, nontropical vegetable oils, and nuts, but low amounts of red meat (i.e., Mediterranean-style diet, DASH [Dietary Approaches to Stop Hypertension] diet).  • Limit sugary drinks and sweets.  • Limit saturated and trans fat to 5% to 6% of calories.  • Limit sodium intake to 2,400 mg daily (about one teaspoon table salt [kosher/sea salt have less sodium per teaspoon]).  • Fad diets will come and go.  Studies show that the most effective diet is one that you can continue long term.     Weight loss / Calorie Counting Apps:    • Lose It!   • MyFitness Pal   • Works great when you try it with a partner/ friend    Exercise:   • Engage in moderate-to-vigorous " aerobic activity for at least 40 minutes (on average) three to four times each week.    Wearables:   • Activity tracker   • Step tracker: getting 7,500 steps daily can cut your cardiac risks by 44%     Bone Health:   • Https://www.nof.org/patients/treatment/nutrition/  • Routine weight bearing exercise    Vaccines:   • Flu vaccine every fall  • https://www.vaccinateyourfamily.org/

## 2019-10-24 LAB
BUN SERPL-MCNC: 10 MG/DL (ref 8–23)
BUN/CREAT SERPL: 13 (ref 7–25)
CALCIUM SERPL-MCNC: 9.9 MG/DL (ref 8.6–10.5)
CHLORIDE SERPL-SCNC: 90 MMOL/L (ref 98–107)
CO2 SERPL-SCNC: 28.5 MMOL/L (ref 22–29)
CREAT SERPL-MCNC: 0.77 MG/DL (ref 0.57–1)
GLUCOSE SERPL-MCNC: 96 MG/DL (ref 65–99)
POTASSIUM SERPL-SCNC: 4.4 MMOL/L (ref 3.5–5.2)
SODIUM SERPL-SCNC: 132 MMOL/L (ref 136–145)

## 2019-10-24 RX ORDER — LISINOPRIL 40 MG/1
TABLET ORAL
Qty: 90 TABLET | Refills: 1 | Status: SHIPPED | OUTPATIENT
Start: 2019-10-24 | End: 2020-07-14 | Stop reason: SDUPTHER

## 2019-11-12 PROCEDURE — 93227 XTRNL ECG REC<48 HR R&I: CPT | Performed by: INTERNAL MEDICINE

## 2019-12-05 DIAGNOSIS — M50.90 CERVICAL DISC DISEASE: ICD-10-CM

## 2019-12-05 RX ORDER — HYDROCODONE BITARTRATE AND ACETAMINOPHEN 7.5; 325 MG/1; MG/1
1 TABLET ORAL EVERY 4 HOURS PRN
Qty: 180 TABLET | Refills: 0 | Status: SHIPPED | OUTPATIENT
Start: 2019-12-05 | End: 2020-03-04

## 2019-12-09 RX ORDER — SUMATRIPTAN 100 MG/1
TABLET, FILM COATED ORAL
Qty: 12 TABLET | Refills: 0 | Status: SHIPPED | OUTPATIENT
Start: 2019-12-09 | End: 2020-01-13

## 2020-01-13 RX ORDER — SUMATRIPTAN 100 MG/1
TABLET, FILM COATED ORAL
Qty: 12 TABLET | Refills: 1 | Status: SHIPPED | OUTPATIENT
Start: 2020-01-13 | End: 2020-04-07

## 2020-02-12 DIAGNOSIS — F51.01 PRIMARY INSOMNIA: ICD-10-CM

## 2020-02-12 RX ORDER — ZOLPIDEM TARTRATE 10 MG/1
10 TABLET ORAL NIGHTLY PRN
Qty: 30 TABLET | Refills: 0 | Status: SHIPPED | OUTPATIENT
Start: 2020-02-12 | End: 2020-03-18

## 2020-02-12 NOTE — TELEPHONE ENCOUNTER
Contract signed 08/29/19  Tomás ran 12/05/19  Last OV: 10/23/19  Next OV: 03/04/20    ** Attempted to return her call, unable to reach.

## 2020-03-04 ENCOUNTER — OFFICE VISIT (OUTPATIENT)
Dept: INTERNAL MEDICINE | Facility: CLINIC | Age: 85
End: 2020-03-04

## 2020-03-04 VITALS
WEIGHT: 115.2 LBS | DIASTOLIC BLOOD PRESSURE: 70 MMHG | HEIGHT: 63 IN | SYSTOLIC BLOOD PRESSURE: 130 MMHG | TEMPERATURE: 99.1 F | OXYGEN SATURATION: 99 % | BODY MASS INDEX: 20.41 KG/M2 | HEART RATE: 74 BPM | RESPIRATION RATE: 16 BRPM

## 2020-03-04 DIAGNOSIS — K58.1 IRRITABLE BOWEL SYNDROME WITH CONSTIPATION: ICD-10-CM

## 2020-03-04 DIAGNOSIS — F51.01 PRIMARY INSOMNIA: ICD-10-CM

## 2020-03-04 DIAGNOSIS — I10 ESSENTIAL HYPERTENSION: Primary | ICD-10-CM

## 2020-03-04 DIAGNOSIS — G43.019 INTRACTABLE MIGRAINE WITHOUT AURA AND WITHOUT STATUS MIGRAINOSUS: ICD-10-CM

## 2020-03-04 DIAGNOSIS — M17.11 PRIMARY OSTEOARTHRITIS OF RIGHT KNEE: ICD-10-CM

## 2020-03-04 PROCEDURE — 99214 OFFICE O/P EST MOD 30 MIN: CPT | Performed by: NURSE PRACTITIONER

## 2020-03-04 RX ORDER — HYDROCODONE BITARTRATE AND ACETAMINOPHEN 5; 325 MG/1; MG/1
1 TABLET ORAL EVERY 6 HOURS PRN
Qty: 90 TABLET | Refills: 0 | Status: SHIPPED | OUTPATIENT
Start: 2020-03-04 | End: 2020-07-14 | Stop reason: SDUPTHER

## 2020-03-04 NOTE — PROGRESS NOTES
Name: Veronica Babb  :  1935    Subjective:      Chief Complaint   Patient presents with   • Hypertension     6 Month F/U        Veronica Babb is a 85 y.o. female prior patient of Philip Fuller MD. Dr Fuller has now retired and she is here to establish care with me.  She has multiple chronic medical conditions including: Hypertension, migraines, insomnia, depression/anxiety, GERD, OAB    She was last seen by me on 2019 after being discharged from the hospital with low sodium (127), recheck a sodium by me was normal.  She had a 48-hour Holter monitor that showed a relatively benign monitor study -no significant arrhythmias.  She states her blood pressure is controlled on lisinopril, Norvasc and hydrochlorothiazide.  No shortness of air or chest pain.     Migraines are doing well.  Only about once a month and responds well to imitrex.  No longer taking fiorinal with codeine.  Occasionally she has nausea and she uses Phenergan and is effective.     [facility] for R knee and plans to get gel or possible knee replacement. She has a chronic dull ache and uses a cane to ambulate. She has taken norco in the past and was effective so she can go out of the house.      She has chronic insomnia and has been on Ambien for greater than 1 year is been controlled on this.     She has chronic depression/anxiety and has been controlled on Lexapro.    -She is no longer taking Myrbetriq  -She is stopped using her Estring vaginal ring.  -She is no longer taking Prilosec and has no GI complaints. States her IBS is doing well with bentyl.     Advance Care Planning   ACP discussion was held with the patient during this visit. Patient has an advance directive (not in EMR), copy requested.      Son working 2 jobs trying to move out.       I have reviewed the patient's medical history in detail and updated the computerized patient record.    Past Medical History:   Diagnosis Date   • Anemia     • Back pain    • Depression     Depression acute recurrent   • Encounter for annual health examination 01/29/2014    Annual Health Assessment   • Eustachian tube dysfunction     L Eustachian tube malfunction   • Head congestion    • Headache    • Hypertension    • Insomnia    • Left ear pain    • Wellness examination     Annual Wellness Visit: 10/20/15, 10/01/14       Past Surgical History:   Procedure Laterality Date   • COLONOSCOPY  01/19/2011   • HYSTERECTOMY         No family history on file.    Social History     Socioeconomic History   • Marital status:      Spouse name: Not on file   • Number of children: Not on file   • Years of education: Not on file   • Highest education level: Not on file   Tobacco Use   • Smoking status: Former Smoker   • Smokeless tobacco: Never Used   Substance and Sexual Activity   • Alcohol use: No   • Drug use: Yes     Types: Hydrocodone   • Sexual activity: Never       Most Recent Immunizations   Administered Date(s) Administered   • Flu Mist 10/07/2017   • Flu Vaccine Intradermal Quad 18-64YR 09/01/2019   • Flu Vaccine Quad PF >18YRS 08/01/2014   • Fluzone High Dose =>65 Years ([facility] ONLY) 10/12/2018   • Influenza, Unspecified 08/30/2010   • Pneumococcal Conjugate 13-Valent (PCV13) 10/04/2016   • Pneumococcal Polysaccharide (PPSV23) 01/08/2019   • Pneumococcal, Unspecified 09/10/2011   • Tdap 10/01/2014   • Zostavax 01/01/2011         Review of Systems:   Review of Systems   Respiratory: Negative for shortness of breath.    Cardiovascular: Negative for chest pain.   Endocrine: Negative.    Genitourinary: Negative for difficulty urinating.   Musculoskeletal: Positive for arthralgias.        R knee   Psychiatric/Behavioral: Positive for dysphoric mood. The patient is nervous/anxious.          Objective:      Physical Exam:   Physical Exam   Constitutional: She is oriented to person, place, and time. She appears well-developed. She is cooperative.   Cardiovascular: Normal  "rate, regular rhythm, normal heart sounds, intact distal pulses and normal pulses.   Pulmonary/Chest: Effort normal and breath sounds normal. She exhibits no deformity.   Equal, Unlabored   Abdominal: Soft. Bowel sounds are normal.   Musculoskeletal: She exhibits no edema.        Right knee: She exhibits decreased range of motion. She exhibits no deformity.   R knee crepitus with rom    Neurological: She is alert and oriented to person, place, and time.   Skin: Capillary refill takes 2 to 3 seconds.   Psychiatric: She has a normal mood and affect. Her behavior is normal. Judgment and thought content normal.   Vitals reviewed.        Vital Signs:  Vitals:    03/04/20 1254 03/04/20 1326   BP: 146/78 130/70   BP Location: Left arm    Patient Position: Sitting    Cuff Size: Small Adult    Pulse: 74    Resp: 16    Temp: 99.1 °F (37.3 °C)    TempSrc: Oral    SpO2: 99%    Weight: 52.3 kg (115 lb 3.2 oz)    Height: 160 cm (63\")      Body mass index is 20.41 kg/m².      Results Review:      REVIEWED AND DISCUSSED LAB RESULTS WITH PATIENT      Requested Prescriptions     Signed Prescriptions Disp Refills   • HYDROcodone-acetaminophen (NORCO) 5-325 MG per tablet 90 tablet 0     Sig: Take 1 tablet by mouth Every 6 (Six) Hours As Needed for Moderate Pain .     Routine medications provided by this office will also be refilled via pharmacy request.       Current Outpatient Medications:   •  amLODIPine (NORVASC) 10 MG tablet, TAKE 1 TABLET BY MOUTH DAILY, Disp: 90 tablet, Rfl: 1  •  dicyclomine (BENTYL) 20 MG tablet, Take 1 tablet by mouth Every 6 (Six) Hours., Disp: 60 tablet, Rfl: 5  •  escitalopram (LEXAPRO) 20 MG tablet, TAKE 1 TABLET BY MOUTH DAILY, Disp: 90 tablet, Rfl: 0  •  hydroCHLOROthiazide (HYDRODIURIL) 12.5 MG tablet, TAKE 1 TABLET BY MOUTH DAILY, Disp: 90 tablet, Rfl: 1  •  lisinopril (PRINIVIL,ZESTRIL) 40 MG tablet, TAKE 1 TABLET BY MOUTH DAILY, Disp: 90 tablet, Rfl: 1  •  promethazine (PHENERGAN) 25 MG tablet, Take " 0.5 tablets by mouth Every 8 (Eight) Hours As Needed for Nausea or Vomiting. (Patient taking differently: Take 12.5 mg by mouth As Needed for Nausea or Vomiting.), Disp: 40 tablet, Rfl: 0  •  SUMAtriptan (IMITREX) 100 MG tablet, TAKE ONE TABLET BY MOUTH AT ONSET OF HEADACHE; MAY REPEAT ONE TABLET IN 2 HOURS IF NEEDED *MAX 2 TABLETS IN 24 HOURS, Disp: 12 tablet, Rfl: 1  •  zolpidem (AMBIEN) 10 MG tablet, Take 1 tablet by mouth At Night As Needed for Sleep., Disp: 30 tablet, Rfl: 0  •  HYDROcodone-acetaminophen (NORCO) 5-325 MG per tablet, Take 1 tablet by mouth Every 6 (Six) Hours As Needed for Moderate Pain ., Disp: 90 tablet, Rfl: 0       Assessment and Plan:        Problem List Items Addressed This Visit        Cardiovascular and Mediastinum    Essential hypertension - Primary     Hypertension is improving with treatment.  Continue current treatment regimen.  Dietary sodium restriction.  Regular aerobic exercise.  Blood pressure will be reassessed at the next regular appointment.         Relevant Orders    CBC (No Diff)    Basic Metabolic Panel    Migraine without aura     Headaches are improving with treatment.  Continue current treatment regimen.             Relevant Medications    HYDROcodone-acetaminophen (NORCO) 5-325 MG per tablet       Digestive    Irritable bowel syndrome with constipation     Stable on bentyl            Musculoskeletal and Integument    Primary osteoarthritis of right knee     Will refill norco  Continue to use lowest effective dose  Plan for FU with Ortho          Relevant Medications    HYDROcodone-acetaminophen (NORCO) 5-325 MG per tablet       Other    Primary insomnia     Stable on ambien  Discussed safety precautions                 The patient has read and signed the [facility] Controlled Substance Contract.  I will continue to see patient for regular follow up appointments.  They are well controlled on their medication.  GERALD is updated every 3 months. The patient is aware  "of the potential for addiction and dependence.    Discussed any change in Rx and discussed visit with patient.  All questions answered.      Return in about 6 months (around 9/4/2020) for Medicare Wellness.    Kashmir \"Eliazar\" Justo, APRN   03/04/20    Dragon disclaimer:   Much of this encounter note is an electronic transcription/translation of spoken language to printed text. The electronic translation of spoken language may permit erroneous, or at times, nonsensical words or phrases to be inadvertently transcribed; Although I have reviewed the note for such errors, some may still exist.     Additional Patient Counseling:       There are no Patient Instructions on file for this visit.  "

## 2020-03-05 LAB
BUN SERPL-MCNC: 13 MG/DL (ref 8–23)
BUN/CREAT SERPL: 17.1 (ref 7–25)
CALCIUM SERPL-MCNC: 9.5 MG/DL (ref 8.6–10.5)
CHLORIDE SERPL-SCNC: 88 MMOL/L (ref 98–107)
CO2 SERPL-SCNC: 28.9 MMOL/L (ref 22–29)
CREAT SERPL-MCNC: 0.76 MG/DL (ref 0.57–1)
ERYTHROCYTE [DISTWIDTH] IN BLOOD BY AUTOMATED COUNT: 12.4 % (ref 12.3–15.4)
GLUCOSE SERPL-MCNC: 86 MG/DL (ref 65–99)
HCT VFR BLD AUTO: 34.7 % (ref 34–46.6)
HGB BLD-MCNC: 11.5 G/DL (ref 12–15.9)
MCH RBC QN AUTO: 30.3 PG (ref 26.6–33)
MCHC RBC AUTO-ENTMCNC: 33.1 G/DL (ref 31.5–35.7)
MCV RBC AUTO: 91.6 FL (ref 79–97)
PLATELET # BLD AUTO: 229 10*3/MM3 (ref 140–450)
POTASSIUM SERPL-SCNC: 3.9 MMOL/L (ref 3.5–5.2)
RBC # BLD AUTO: 3.79 10*6/MM3 (ref 3.77–5.28)
SODIUM SERPL-SCNC: 128 MMOL/L (ref 136–145)
WBC # BLD AUTO: 5.57 10*3/MM3 (ref 3.4–10.8)

## 2020-03-05 NOTE — PROGRESS NOTES
Notify patient that her labs look similar to before.   Her sodium is lower again and I believe it is because of her lexapro.    She needs to cut the dose in half for 2 weeks and they try to stop the rx.     Repeat bmp in 3 weeks.     LUDIN

## 2020-03-06 DIAGNOSIS — E87.1 LOW SODIUM LEVELS: Primary | ICD-10-CM

## 2020-03-11 ENCOUNTER — RESULTS ENCOUNTER (OUTPATIENT)
Dept: INTERNAL MEDICINE | Facility: CLINIC | Age: 85
End: 2020-03-11

## 2020-03-11 DIAGNOSIS — E87.1 LOW SODIUM LEVELS: ICD-10-CM

## 2020-03-17 DIAGNOSIS — F51.01 PRIMARY INSOMNIA: ICD-10-CM

## 2020-03-18 RX ORDER — ZOLPIDEM TARTRATE 10 MG/1
10 TABLET ORAL NIGHTLY PRN
Qty: 30 TABLET | Refills: 5 | Status: SHIPPED | OUTPATIENT
Start: 2020-03-18 | End: 2020-09-04

## 2020-04-07 RX ORDER — SUMATRIPTAN 100 MG/1
TABLET, FILM COATED ORAL
Qty: 12 TABLET | Refills: 0 | Status: SHIPPED | OUTPATIENT
Start: 2020-04-07 | End: 2020-04-10 | Stop reason: SDUPTHER

## 2020-04-10 ENCOUNTER — TELEPHONE (OUTPATIENT)
Dept: INTERNAL MEDICINE | Facility: CLINIC | Age: 85
End: 2020-04-10

## 2020-04-10 RX ORDER — SUMATRIPTAN 100 MG/1
TABLET, FILM COATED ORAL
Qty: 12 TABLET | Refills: 0 | Status: SHIPPED | OUTPATIENT
Start: 2020-04-10 | End: 2020-05-20

## 2020-04-10 RX ORDER — PROMETHAZINE HYDROCHLORIDE 25 MG/1
12.5 TABLET ORAL EVERY 8 HOURS PRN
Qty: 20 TABLET | Refills: 0 | Status: SHIPPED | OUTPATIENT
Start: 2020-04-10 | End: 2020-06-24

## 2020-04-10 NOTE — TELEPHONE ENCOUNTER
PATIENT CALLED TO REQUEST A REFILL ON SUMAtriptan (IMITREX) 100 MG tablet PLEASE SEND RX TO [address], [place] - [address] [facility] AT [place] [id number] [place] [phone] FX    PATIENT WOULD LIKE A REFILL ON promethazine (PHENERGAN) 25 MG tablet. PLEASE SEND THIS RX TO   [id number] - [place], [place] - [address] [facility] [address] AT [facility] [address] [place] [phone] [place] [phone] FX

## 2020-04-10 NOTE — TELEPHONE ENCOUNTER
Please advise refills, promethazine has not been refilled for sometime. Would like to verify these refills are appropriate.

## 2020-04-29 ENCOUNTER — TELEPHONE (OUTPATIENT)
Dept: INTERNAL MEDICINE | Facility: CLINIC | Age: 85
End: 2020-04-29

## 2020-04-29 ENCOUNTER — OFFICE VISIT (OUTPATIENT)
Dept: INTERNAL MEDICINE | Facility: CLINIC | Age: 85
End: 2020-04-29

## 2020-04-29 DIAGNOSIS — R05.9 COUGH: Primary | ICD-10-CM

## 2020-04-29 DIAGNOSIS — G43.019 INTRACTABLE MIGRAINE WITHOUT AURA AND WITHOUT STATUS MIGRAINOSUS: ICD-10-CM

## 2020-04-29 PROCEDURE — 99441 PR PHYS/QHP TELEPHONE EVALUATION 5-10 MIN: CPT | Performed by: INTERNAL MEDICINE

## 2020-04-29 RX ORDER — AMOXICILLIN 875 MG/1
875 TABLET, COATED ORAL 2 TIMES DAILY
Qty: 14 TABLET | Refills: 0 | Status: SHIPPED | OUTPATIENT
Start: 2020-04-29 | End: 2020-05-06

## 2020-04-29 RX ORDER — CODEINE/BUTALBITAL/ASA/CAFFEIN 30-50-325
1 CAPSULE ORAL EVERY 4 HOURS PRN
Qty: 100 CAPSULE | Refills: 0 | Status: SHIPPED | OUTPATIENT
Start: 2020-04-29 | End: 2021-06-24

## 2020-04-29 NOTE — TELEPHONE ENCOUNTER
PATIENT CALLING AND STATES SHE HAS A COUGH THAT MAKES HER THROAT SORE AND EAR PAIN. WOULD LIKE SOMETHING CALLED IN.    VERIFIED [facility] ON [facility].    PATIENT CALL BACK [phone].

## 2020-04-29 NOTE — PROGRESS NOTES
Chief Complaint   Patient presents with   • Cough       Subjective   Veronica Babb is a 85 y.o. female.     You have chosen to receive care through a telephone visit. Do you consent to use a telephone visit for your medical care today? Yes     Cough   This is a new problem. The current episode started 1 to 4 weeks ago. The problem has been unchanged. The problem occurs constantly. The cough is non-productive. Associated symptoms include ear pain, rhinorrhea and a sore throat (scratchy from the cough). Pertinent negatives include no chest pain, chills, fever, shortness of breath or wheezing. Nothing aggravates the symptoms. Treatments tried: mucinex, benadryl. The treatment provided mild relief.      For the last couple of weeks, she has had problems with her ears hurting.  She feels a lot of pressure in her ears.  She has had a Hakki, relentless cough.  She has been sneezing.  She has a runny nose.  Her throat feels irritated from coughing.  No fever.  She has tried Benadryl and Mucinex.  Neither 1 is really helped.  Maybe just a little bit.    She also has history of migraine headaches.  She needs a refill on Fiorinal with codeine.    The following portions of the patient's history were reviewed and updated as appropriate: allergies, current medications, past family history, past medical history, past social history, past surgical history and problem list.    Review of Systems   Constitutional: Negative for appetite change, chills and fever.   HENT: Positive for ear pain, rhinorrhea, sneezing, sore throat (scratchy from the cough) and swollen glands (a little).    Respiratory: Positive for cough. Negative for shortness of breath and wheezing.    Cardiovascular: Negative for chest pain.   Gastrointestinal: Negative for nausea and vomiting.         Current Outpatient Medications:   •  amLODIPine (NORVASC) 10 MG tablet, TAKE 1 TABLET BY MOUTH DAILY, Disp: 90 tablet, Rfl: 1  •  amoxicillin (AMOXIL) 875 MG tablet,  Take 1 tablet by mouth 2 (Two) Times a Day for 7 days., Disp: 14 tablet, Rfl: 0  •  butalbital-aspirin-caffeine-codeine (FIORINAL WITH CODEINE) -88-30 MG capsule, Take 1 capsule by mouth Every 4 (Four) Hours As Needed for Headache., Disp: 100 capsule, Rfl: 0  •  dicyclomine (BENTYL) 20 MG tablet, Take 1 tablet by mouth Every 6 (Six) Hours., Disp: 60 tablet, Rfl: 5  •  escitalopram (LEXAPRO) 20 MG tablet, TAKE 1 TABLET BY MOUTH DAILY, Disp: 90 tablet, Rfl: 0  •  hydroCHLOROthiazide (HYDRODIURIL) 12.5 MG tablet, TAKE 1 TABLET BY MOUTH DAILY, Disp: 90 tablet, Rfl: 1  •  HYDROcodone-acetaminophen (NORCO) 5-325 MG per tablet, Take 1 tablet by mouth Every 6 (Six) Hours As Needed for Moderate Pain ., Disp: 90 tablet, Rfl: 0  •  lisinopril (PRINIVIL,ZESTRIL) 40 MG tablet, TAKE 1 TABLET BY MOUTH DAILY, Disp: 90 tablet, Rfl: 1  •  promethazine (PHENERGAN) 25 MG tablet, Take 0.5 tablets by mouth Every 8 (Eight) Hours As Needed for Nausea or Vomiting., Disp: 20 tablet, Rfl: 0  •  SUMAtriptan (IMITREX) 100 MG tablet, TAKE ONE TABLET BY MOUTH AT ONSET OF HEADACHE; MAY REPEATE ONE TABLET IN 2 HOURS IF NEEDED **MAX 2 TABLETS IN 24 HOURS**, Disp: 12 tablet, Rfl: 0  •  zolpidem (AMBIEN) 10 MG tablet, TAKE 1 TABLET BY MOUTH AT NIGHT AS NEEDED FOR SLEEP, Disp: 30 tablet, Rfl: 5        Objective     There were no vitals taken for this visit.    Physical Exam   Constitutional: She is oriented to person, place, and time.   Pulmonary/Chest: No respiratory distress.   Neurological: She is alert and oriented to person, place, and time.   Psychiatric: Her speech is normal.         Assessment/Plan   Veronica was seen today for cough.    Diagnoses and all orders for this visit:    Cough    Intractable migraine without aura and without status migrainosus  -     butalbital-aspirin-caffeine-codeine (FIORINAL WITH CODEINE) -30-30 MG capsule; Take 1 capsule by mouth Every 4 (Four) Hours As Needed for Headache.    Other orders  -      amoxicillin (AMOXIL) 875 MG tablet; Take 1 tablet by mouth 2 (Two) Times a Day for 7 days.      10 minutes spent with this telephone visit.  Discussed her symptoms.  Could be allergy related.  With her ears hurting significantly, she might have otitis media.  We will treat for that.  Encouraged her to continue with Mucinex and Benadryl.  Also, she may add Delsym cough syrup.  Encouraged social distancing, wearing a mask while out, frequent handwashing.

## 2020-05-20 RX ORDER — SUMATRIPTAN 100 MG/1
TABLET, FILM COATED ORAL
Qty: 12 TABLET | Refills: 0 | Status: SHIPPED | OUTPATIENT
Start: 2020-05-20 | End: 2020-05-29

## 2020-05-29 RX ORDER — SUMATRIPTAN 100 MG/1
TABLET, FILM COATED ORAL
Qty: 12 TABLET | Refills: 0 | Status: SHIPPED | OUTPATIENT
Start: 2020-05-29 | End: 2020-07-24

## 2020-06-09 ENCOUNTER — OFFICE VISIT (OUTPATIENT)
Dept: INTERNAL MEDICINE | Facility: CLINIC | Age: 85
End: 2020-06-09

## 2020-06-09 VITALS
DIASTOLIC BLOOD PRESSURE: 77 MMHG | WEIGHT: 117.6 LBS | TEMPERATURE: 99.1 F | HEART RATE: 81 BPM | BODY MASS INDEX: 20.84 KG/M2 | SYSTOLIC BLOOD PRESSURE: 138 MMHG | HEIGHT: 63 IN | OXYGEN SATURATION: 98 % | RESPIRATION RATE: 16 BRPM

## 2020-06-09 DIAGNOSIS — K58.1 IRRITABLE BOWEL SYNDROME WITH CONSTIPATION: ICD-10-CM

## 2020-06-09 DIAGNOSIS — K29.00 ACUTE GASTRITIS WITHOUT HEMORRHAGE, UNSPECIFIED GASTRITIS TYPE: ICD-10-CM

## 2020-06-09 DIAGNOSIS — K29.00 ACUTE GASTRITIS WITHOUT HEMORRHAGE, UNSPECIFIED GASTRITIS TYPE: Primary | ICD-10-CM

## 2020-06-09 LAB
ERYTHROCYTE [DISTWIDTH] IN BLOOD BY AUTOMATED COUNT: 12.5 % (ref 12.3–15.4)
HCT VFR BLD AUTO: 34.6 % (ref 34–46.6)
HGB BLD-MCNC: 11.9 G/DL (ref 12–15.9)
MCH RBC QN AUTO: 31.3 PG (ref 26.6–33)
MCHC RBC AUTO-ENTMCNC: 34.4 G/DL (ref 31.5–35.7)
MCV RBC AUTO: 91.1 FL (ref 79–97)
PLATELET # BLD AUTO: 238 10*3/MM3 (ref 140–450)
RBC # BLD AUTO: 3.8 10*6/MM3 (ref 3.77–5.28)
WBC # BLD AUTO: 6.67 10*3/MM3 (ref 3.4–10.8)

## 2020-06-09 PROCEDURE — 99213 OFFICE O/P EST LOW 20 MIN: CPT | Performed by: NURSE PRACTITIONER

## 2020-06-09 RX ORDER — DICYCLOMINE HCL 20 MG
20 TABLET ORAL EVERY 6 HOURS
Qty: 60 TABLET | Refills: 5 | Status: SHIPPED | OUTPATIENT
Start: 2020-06-09 | End: 2021-03-16

## 2020-06-09 RX ORDER — PANTOPRAZOLE SODIUM 40 MG/1
40 TABLET, DELAYED RELEASE ORAL DAILY
Qty: 90 TABLET | Refills: 1 | Status: SHIPPED | OUTPATIENT
Start: 2020-06-09 | End: 2021-06-24

## 2020-06-09 RX ORDER — PANTOPRAZOLE SODIUM 40 MG/1
40 TABLET, DELAYED RELEASE ORAL DAILY
Qty: 30 TABLET | Refills: 0 | Status: SHIPPED | OUTPATIENT
Start: 2020-06-09 | End: 2020-06-09

## 2020-06-09 NOTE — PROGRESS NOTES
Name: Veronica Babb  :  1935    Subjective:      Chief Complaint   Patient presents with   • Abdominal Pain     Pt presents here today with abdominal pain.        Veronica Babb is a 85 y.o. female patient.  She is here today for abdomen pain.     Abdominal Pain   This is a new problem. The current episode started 1 to 4 weeks ago. The onset quality is gradual. The problem occurs daily. The problem has been waxing and waning. The pain is located in the epigastric region. The pain is at a severity of 3/10. The pain is mild. The quality of the pain is dull (gnawing ). The abdominal pain does not radiate. Associated symptoms include arthralgias and headaches. Pertinent negatives include no constipation, diarrhea, fever, nausea or vomiting. Associated symptoms comments: Occasional dark stool. No blood. But states none for a week. . Nothing aggravates the pain. The pain is relieved by eating. She has tried nothing for the symptoms. The treatment provided no relief.   Has taken omeprazole, nexium  in the past.   She has irritable bowel syndrome and uses as needed Bentyl.  However she states she has been out of this.  She denies nausea and vomiting.  No chest pain.  She has chronic headaches and right knee pain.  She needs to have right knee replacement however Ortho was behind at this time so she is delayed.  States she has been confined to her house.  She does not have a computer she does not have many activities to do.  She has been taking her Fiorinal for her headaches and states that when it starts to wear off the headache comes back and she takes another.  About every 4-6 hours.         I have reviewed the patient's medical history in detail and updated the computerized patient record.    Past Medical History:   Diagnosis Date   • Anemia    • Back pain    • Depression     Depression acute recurrent   • Encounter for annual health examination 2014    Annual Health Assessment   • Caro  tube dysfunction     L Eustachian tube malfunction   • Head congestion    • Headache    • Hypertension    • Insomnia    • Left ear pain    • Wellness examination     Annual Wellness Visit: 10/20/15, 10/01/14       Past Surgical History:   Procedure Laterality Date   • COLONOSCOPY  01/19/2011   • HYSTERECTOMY         History reviewed. No pertinent family history.    Social History     Socioeconomic History   • Marital status:      Spouse name: Not on file   • Number of children: Not on file   • Years of education: Not on file   • Highest education level: Not on file   Tobacco Use   • Smoking status: Former Smoker   • Smokeless tobacco: Never Used   Substance and Sexual Activity   • Alcohol use: No   • Drug use: Yes     Types: Hydrocodone   • Sexual activity: Never       Most Recent Immunizations   Administered Date(s) Administered   • Flu Mist 10/07/2017   • Flu Vaccine Intradermal Quad 18-64YR 09/01/2019   • Flu Vaccine Quad PF >18YRS 08/01/2014   • Fluzone High Dose =>65 Years ([facility] ONLY) 10/12/2018   • Influenza, Unspecified 08/30/2010   • Pneumococcal Conjugate 13-Valent (PCV13) 10/04/2016   • Pneumococcal Polysaccharide (PPSV23) 01/08/2019   • Pneumococcal, Unspecified 09/10/2011   • Tdap 10/01/2014   • Zostavax 01/01/2011         Review of Systems:   Review of Systems   Constitutional: Negative for fever and unexpected weight change.   Respiratory: Negative for shortness of breath.    Cardiovascular: Negative for chest pain, palpitations and leg swelling.   Gastrointestinal: Positive for abdominal pain. Negative for abdominal distention, constipation, diarrhea, nausea, rectal pain and vomiting.        ? Dark stools    Endocrine: Negative.    Genitourinary: Negative for difficulty urinating.   Musculoskeletal: Positive for arthralgias and gait problem.   Neurological: Positive for headaches.         Objective:      Physical Exam:   Physical Exam   Constitutional: She is oriented to person, place, and  "time. She appears well-developed. She is cooperative.   Ambulates with cane   Cardiovascular: Normal rate, regular rhythm, normal heart sounds, intact distal pulses and normal pulses.   Pulmonary/Chest: Effort normal and breath sounds normal. She exhibits no deformity.   Equal, Unlabored   Abdominal: Soft. Bowel sounds are normal. She exhibits no distension and no mass. There is no hepatosplenomegaly. There is tenderness in the epigastric area. There is no rigidity, no rebound, no guarding, no CVA tenderness, no tenderness at McBurney's point and negative Adams's sign. No hernia.   Musculoskeletal:        Right knee: She exhibits decreased range of motion and swelling.   Neurological: She is alert and oriented to person, place, and time.   Skin: Capillary refill takes 2 to 3 seconds.   Psychiatric: She has a normal mood and affect.   Vitals reviewed.        Vital Signs:  Vitals:    06/09/20 1419   BP: 138/77   BP Location: Left arm   Patient Position: Sitting   Cuff Size: Adult   Pulse: 81   Resp: 16   Temp: 99.1 °F (37.3 °C)   TempSrc: Oral   SpO2: 98%   Weight: 53.3 kg (117 lb 9.6 oz)   Height: 160 cm (63\")     Body mass index is 20.83 kg/m².      Results Review:      REVIEWED AND DISCUSSED LAB RESULTS WITH PATIENT      Requested Prescriptions     Signed Prescriptions Disp Refills   • dicyclomine (BENTYL) 20 MG tablet 60 tablet 5     Sig: Take 1 tablet by mouth Every 6 (Six) Hours.   • pantoprazole (PROTONIX) 40 MG EC tablet 30 tablet 0     Sig: Take 1 tablet by mouth Daily.     Routine medications provided by this office will also be refilled via pharmacy request.       Current Outpatient Medications:   •  amLODIPine (NORVASC) 10 MG tablet, TAKE 1 TABLET BY MOUTH DAILY, Disp: 90 tablet, Rfl: 1  •  butalbital-aspirin-caffeine-codeine (FIORINAL WITH CODEINE) -42-30 MG capsule, Take 1 capsule by mouth Every 4 (Four) Hours As Needed for Headache., Disp: 100 capsule, Rfl: 0  •  dicyclomine (BENTYL) 20 MG " tablet, Take 1 tablet by mouth Every 6 (Six) Hours., Disp: 60 tablet, Rfl: 5  •  escitalopram (LEXAPRO) 20 MG tablet, TAKE 1 TABLET BY MOUTH DAILY, Disp: 90 tablet, Rfl: 0  •  hydroCHLOROthiazide (HYDRODIURIL) 12.5 MG tablet, TAKE 1 TABLET BY MOUTH DAILY, Disp: 90 tablet, Rfl: 1  •  HYDROcodone-acetaminophen (NORCO) 5-325 MG per tablet, Take 1 tablet by mouth Every 6 (Six) Hours As Needed for Moderate Pain ., Disp: 90 tablet, Rfl: 0  •  lisinopril (PRINIVIL,ZESTRIL) 40 MG tablet, TAKE 1 TABLET BY MOUTH DAILY, Disp: 90 tablet, Rfl: 1  •  promethazine (PHENERGAN) 25 MG tablet, Take 0.5 tablets by mouth Every 8 (Eight) Hours As Needed for Nausea or Vomiting., Disp: 20 tablet, Rfl: 0  •  SUMAtriptan (IMITREX) 100 MG tablet, TAKE ONE TABLET BY MOUTH AT ONSET OF HEADACHE; MAY REPEAT ONE TABLET IN 2 HOURS IF NEEDED *MAX 2 TABLETS IN 24 HOURS*, Disp: 12 tablet, Rfl: 0  •  zolpidem (AMBIEN) 10 MG tablet, TAKE 1 TABLET BY MOUTH AT NIGHT AS NEEDED FOR SLEEP, Disp: 30 tablet, Rfl: 5  •  pantoprazole (PROTONIX) 40 MG EC tablet, Take 1 tablet by mouth Daily., Disp: 30 tablet, Rfl: 0       Assessment and Plan:        Problem List Items Addressed This Visit        Digestive    Irritable bowel syndrome with constipation    Relevant Medications    dicyclomine (BENTYL) 20 MG tablet      Other Visit Diagnoses     Acute gastritis without hemorrhage, unspecified gastritis type    -  Primary    Relevant Medications    dicyclomine (BENTYL) 20 MG tablet    pantoprazole (PROTONIX) 40 MG EC tablet    Other Relevant Orders    CBC (No Diff)        Feel with her increased anxiety, increased frequency use of Fiorinal she is developed some gastritis.  She will limit this medication.   I will add Protonix and check a CBC to see if she is developed a possible GI bleed.   Possible referral to GI  Also consider adding Carafate.    She will contact me if worsens or does not improve.    Discussed any change in Rx and discussed visit with patient.  All  "questions answered.      Return if symptoms worsen or fail to improve.    Kashmir \"Eliazar\" Justo, ANGELIA   06/09/20    Dragon disclaimer:   Much of this encounter note is an electronic transcription/translation of spoken language to printed text. The electronic translation of spoken language may permit erroneous, or at times, nonsensical words or phrases to be inadvertently transcribed; Although I have reviewed the note for such errors, some may still exist.     Additional Patient Counseling:       There are no Patient Instructions on file for this visit.  "

## 2020-06-24 RX ORDER — PROMETHAZINE HYDROCHLORIDE 25 MG/1
TABLET ORAL
Qty: 20 TABLET | Refills: 0 | Status: SHIPPED | OUTPATIENT
Start: 2020-06-24 | End: 2021-02-11

## 2020-07-14 ENCOUNTER — TELEPHONE (OUTPATIENT)
Dept: INTERNAL MEDICINE | Facility: CLINIC | Age: 85
End: 2020-07-14

## 2020-07-14 DIAGNOSIS — I10 ESSENTIAL HYPERTENSION: ICD-10-CM

## 2020-07-14 DIAGNOSIS — M17.11 PRIMARY OSTEOARTHRITIS OF RIGHT KNEE: ICD-10-CM

## 2020-07-14 NOTE — TELEPHONE ENCOUNTER
Caller: Veronica Babb    Relationship: Self    Best call back number: [phone]       Medication needed:   Requested Prescriptions     Pending Prescriptions Disp Refills   • lisinopril (PRINIVIL,ZESTRIL) 40 MG tablet 90 tablet 1     Sig: Take 1 tablet by mouth Daily.   • HYDROcodone-acetaminophen (NORCO) 5-325 MG per tablet 90 tablet 0     Sig: Take 1 tablet by mouth Every 6 (Six) Hours As Needed for Moderate Pain .   • amLODIPine (NORVASC) 10 MG tablet 90 tablet 1     Sig: Take 1 tablet by mouth Daily.         What details did the patient provide when requesting the medication:  PATIENT IS CALLING ABOUT HER MEDICATION. PATIENT IS UNSURE WHAT MEDICATION ARE FOR AND WHAT SHE IS TAKING THE MEDICATIONS FOR.    hydroCHLOROthiazide (HYDRODIURIL) 12.5 MG tablet  amLODIPine (NORVASC) 10 MG tablet  lisinopril (PRINIVIL,ZESTRIL) 40 MG tablet    PLEASE CALL AND ADVISE ON THESE MEDICATIONS    Does the patient have less than a 3 day supply:  [x] Yes  [] No    What is the patient's preferred pharmacy:      [facility] #[id number] [address] [facility] [address] [facility] - [phone] [facility] [phone]   [phone]

## 2020-07-15 RX ORDER — LISINOPRIL 40 MG/1
40 TABLET ORAL DAILY
Qty: 90 TABLET | Refills: 1 | Status: SHIPPED | OUTPATIENT
Start: 2020-07-15 | End: 2021-01-25

## 2020-07-15 RX ORDER — AMLODIPINE BESYLATE 10 MG/1
10 TABLET ORAL DAILY
Qty: 90 TABLET | Refills: 1 | Status: SHIPPED | OUTPATIENT
Start: 2020-07-15 | End: 2021-01-13

## 2020-07-15 RX ORDER — HYDROCODONE BITARTRATE AND ACETAMINOPHEN 5; 325 MG/1; MG/1
1 TABLET ORAL EVERY 6 HOURS PRN
Qty: 90 TABLET | Refills: 0 | Status: SHIPPED | OUTPATIENT
Start: 2020-07-15 | End: 2021-06-24

## 2020-07-15 NOTE — TELEPHONE ENCOUNTER
Last OV   06/09  Next OV 09/04  GERALD done today , CSA 03/04/2020  please review med refill and advise

## 2020-07-24 RX ORDER — SUMATRIPTAN 100 MG/1
TABLET, FILM COATED ORAL
Qty: 12 TABLET | Refills: 0 | Status: SHIPPED | OUTPATIENT
Start: 2020-07-24 | End: 2020-07-24

## 2020-07-24 RX ORDER — SUMATRIPTAN 100 MG/1
TABLET, FILM COATED ORAL
Qty: 12 TABLET | Refills: 1 | Status: SHIPPED | OUTPATIENT
Start: 2020-07-24 | End: 2020-10-05 | Stop reason: SDUPTHER

## 2020-09-04 DIAGNOSIS — F51.01 PRIMARY INSOMNIA: ICD-10-CM

## 2020-09-09 ENCOUNTER — TELEPHONE (OUTPATIENT)
Dept: INTERNAL MEDICINE | Facility: CLINIC | Age: 85
End: 2020-09-09

## 2020-09-09 DIAGNOSIS — F51.01 PRIMARY INSOMNIA: ICD-10-CM

## 2020-09-09 RX ORDER — ZOLPIDEM TARTRATE 10 MG/1
10 TABLET ORAL NIGHTLY PRN
Qty: 30 TABLET | Refills: 0 | Status: SHIPPED | OUTPATIENT
Start: 2020-09-09 | End: 2020-10-02

## 2020-09-09 NOTE — TELEPHONE ENCOUNTER
PATIENT CALLED IN AND STATED SHE NEEDS A REFILL OF zolpidem (AMBIEN) 10 MG tablet  PATIENT IS OUT OF MEDICATION         SENT TO : [facility] #[id number] - [place], [place] - [address] AT [facility] [address] [facility] - [phone] [facility] [phone]   [phone]        PATIENT CALL BACK [phone]

## 2020-10-02 DIAGNOSIS — F51.01 PRIMARY INSOMNIA: ICD-10-CM

## 2020-10-02 RX ORDER — ZOLPIDEM TARTRATE 10 MG/1
10 TABLET ORAL NIGHTLY PRN
Qty: 30 TABLET | Refills: 2 | Status: SHIPPED | OUTPATIENT
Start: 2020-10-02 | End: 2021-01-05

## 2020-10-05 RX ORDER — SUMATRIPTAN 100 MG/1
TABLET, FILM COATED ORAL
Qty: 12 TABLET | Refills: 1 | Status: SHIPPED | OUTPATIENT
Start: 2020-10-05 | End: 2021-08-03

## 2020-10-05 NOTE — TELEPHONE ENCOUNTER
Caller: Veronica Babb    Relationship: Self    Best call back number: [phone]   Medication needed:   Requested Prescriptions     Pending Prescriptions Disp Refills   • SUMAtriptan (IMITREX) 100 MG tablet 12 tablet 1     Sig: Take one tablet at onset of headache. May repeat dose one time in 2 hours if headache not relieved.       When do you need the refill by:   ASAP   What details did the patient provide when requesting the medication: ASAP     Does the patient have less than a 3 day supply:  [x] Yes  [] No    What is the patient's preferred pharmacy: [facility] #[id number] [facility] [address] [facility] [address] [facility] [phone] [facility] [phone]

## 2021-01-04 DIAGNOSIS — F51.01 PRIMARY INSOMNIA: ICD-10-CM

## 2021-01-05 RX ORDER — ZOLPIDEM TARTRATE 10 MG/1
10 TABLET ORAL NIGHTLY PRN
Qty: 14 TABLET | Refills: 0 | Status: SHIPPED | OUTPATIENT
Start: 2021-01-05 | End: 2021-01-06 | Stop reason: SDUPTHER

## 2021-01-06 ENCOUNTER — TELEPHONE (OUTPATIENT)
Dept: INTERNAL MEDICINE | Facility: CLINIC | Age: 86
End: 2021-01-06

## 2021-01-06 DIAGNOSIS — F51.01 PRIMARY INSOMNIA: ICD-10-CM

## 2021-01-06 RX ORDER — ZOLPIDEM TARTRATE 10 MG/1
10 TABLET ORAL NIGHTLY PRN
Qty: 30 TABLET | Refills: 0 | Status: SHIPPED | OUTPATIENT
Start: 2021-01-06 | End: 2021-02-17

## 2021-01-06 NOTE — TELEPHONE ENCOUNTER
Caller: Veronica Babb    Relationship: Self    Best call back number: [phone]    Medication needed:   Requested Prescriptions     Pending Prescriptions Disp Refills   • zolpidem (AMBIEN) 10 MG tablet 14 tablet 0     Sig: Take 1 tablet by mouth At Night As Needed for Sleep.       When do you need the refill by: 1/18    What details did the patient provide when requesting the medication: PATIENT HAS AN APPOINTMENT SCHEDULED FOR JAN 29 AT 2:30 PATIENT WILL RUN OUT OF MEDICATION ON THE 20TH.  REQUESTING 10 DAYS ON OR BY JAN 18-20TH.    Does the patient have less than a 3 day supply:  [] Yes  [x] No    What is the patient's preferred pharmacy: [facility] #[id number] [place], [place] - [address] AT [facility] [address] [facility] [phone] [facility] [phone]

## 2021-01-13 DIAGNOSIS — I10 ESSENTIAL HYPERTENSION: ICD-10-CM

## 2021-01-13 RX ORDER — AMLODIPINE BESYLATE 10 MG/1
10 TABLET ORAL DAILY
Qty: 90 TABLET | Refills: 1 | Status: SHIPPED | OUTPATIENT
Start: 2021-01-13 | End: 2021-11-15

## 2021-01-25 DIAGNOSIS — I10 ESSENTIAL HYPERTENSION: ICD-10-CM

## 2021-01-25 RX ORDER — LISINOPRIL 40 MG/1
40 TABLET ORAL DAILY
Qty: 90 TABLET | Refills: 1 | Status: SHIPPED | OUTPATIENT
Start: 2021-01-25 | End: 2021-10-07

## 2021-01-28 ENCOUNTER — PATIENT MESSAGE (OUTPATIENT)
Dept: INTERNAL MEDICINE | Facility: CLINIC | Age: 86
End: 2021-01-28

## 2021-01-28 NOTE — TELEPHONE ENCOUNTER
From: Veronica Babb  To: Josef Kemp III, PREM  Sent: 1/28/2021 10:34 AM EST  Subject: Non-Urgent Medical Question    Good Morning - I am writing for my mother Veronica Babb. I am her son Ruben in [place]. I just registered her online to try to get her on a COVID vaccine schedule. There are no appts available and i just want to be sure if that is the only way or can a provider arrange one. Sorry to bother you with this, but i dont know how your system works. I have access to her my chart.  Thank you  Ruben Jackson

## 2021-01-29 ENCOUNTER — OFFICE VISIT (OUTPATIENT)
Dept: INTERNAL MEDICINE | Facility: CLINIC | Age: 86
End: 2021-01-29

## 2021-01-29 VITALS
DIASTOLIC BLOOD PRESSURE: 70 MMHG | HEART RATE: 80 BPM | BODY MASS INDEX: 20.91 KG/M2 | HEIGHT: 63 IN | TEMPERATURE: 97.3 F | SYSTOLIC BLOOD PRESSURE: 130 MMHG | WEIGHT: 118 LBS | OXYGEN SATURATION: 99 % | RESPIRATION RATE: 16 BRPM

## 2021-01-29 DIAGNOSIS — F51.01 PRIMARY INSOMNIA: Chronic | ICD-10-CM

## 2021-01-29 DIAGNOSIS — I10 ESSENTIAL HYPERTENSION: Primary | Chronic | ICD-10-CM

## 2021-01-29 DIAGNOSIS — K21.00 GASTROESOPHAGEAL REFLUX DISEASE WITH ESOPHAGITIS WITHOUT HEMORRHAGE: Chronic | ICD-10-CM

## 2021-01-29 DIAGNOSIS — F32.9 REACTIVE DEPRESSION: Chronic | ICD-10-CM

## 2021-01-29 DIAGNOSIS — M17.11 PRIMARY OSTEOARTHRITIS OF RIGHT KNEE: Chronic | ICD-10-CM

## 2021-01-29 PROBLEM — G43.009 MIGRAINE WITHOUT AURA: Chronic | Status: ACTIVE | Noted: 2018-07-17

## 2021-01-29 PROCEDURE — 99214 OFFICE O/P EST MOD 30 MIN: CPT | Performed by: NURSE PRACTITIONER

## 2021-01-29 NOTE — ASSESSMENT & PLAN NOTE
She has tried to go down to 5 mg ambien and can't sleep.     We discussed risks: falls  She is aware and states she needs to continue because she can't function if she can't sleep.

## 2021-01-29 NOTE — PROGRESS NOTES
Chief Complaint  Hypertension     Subjective:      History of Present Illness {CC  Problem List  Visit  Diagnosis   Encounters  Notes  Medications  Labs  Result Review Imaging  Media :23}     Veronica Babb presents to [facility] for chronic medical conditions including: Hypertension, migraines, insomnia, depression/anxiety, GERD, OAB    LV: 6/9/20: GI sx resolved.     States she has been doing well.   Still needs R knee - had delayed due to pandemic   Denies falls.              Objective:      Physical Exam  Vitals signs reviewed.   Constitutional:       Appearance: She is well-developed.   HENT:      Head: Normocephalic.      Comments: Wearing mask due to COVID   Eyes:      Conjunctiva/sclera: Conjunctivae normal.      Pupils: Pupils are equal, round, and reactive to light.   Neck:      Musculoskeletal: Normal range of motion and neck supple.      Thyroid: No thyromegaly.   Cardiovascular:      Rate and Rhythm: Normal rate and regular rhythm.      Pulses: Normal pulses.      Heart sounds: Normal heart sounds.   Pulmonary:      Effort: Pulmonary effort is normal.      Breath sounds: Normal breath sounds.      Comments: E/U   Abdominal:      General: Bowel sounds are normal.      Palpations: Abdomen is soft.   Musculoskeletal:      Right knee: She exhibits decreased range of motion. Tenderness found.   Lymphadenopathy:      Cervical: No cervical adenopathy.   Skin:     General: Skin is warm and dry.      Capillary Refill: Capillary refill takes 2 to 3 seconds.   Neurological:      Mental Status: She is alert and oriented to person, place, and time.   Psychiatric:         Behavior: Behavior normal. Behavior is cooperative.         Thought Content: Thought content normal.         Judgment: Judgment normal.        Result Review  Data Reviewed:{ Labs  Result Review  Imaging  Med Tab  Media :23}          Vital Signs:   /70 (BP Location: Left arm, Patient  "Position: Sitting, Cuff Size: Adult)   Pulse 80   Temp 97.3 °F (36.3 °C) (Oral)   Resp 16   Ht 160 cm (63\")   Wt 53.5 kg (118 lb)   SpO2 99%   BMI 20.90 kg/m²          Assessment and Plan:      Assessment and Plan {CC Problem List  Visit Diagnosis  ROS  Review (Popup)  Health Maintenance  Quality  BestPractice  Medications  SmartSets  SnapShot Encounters  Media :23}     Problem List Items Addressed This Visit        Cardiac and Vasculature    Essential hypertension - Primary (Chronic)    Current Assessment & Plan     Hypertension is improving with treatment.  Continue current treatment regimen.  Dietary sodium restriction.  Regular aerobic exercise.  Blood pressure will be reassessed at the next regular appointment.         Relevant Orders    Comprehensive Metabolic Panel    CBC (No Diff)       Gastrointestinal Abdominal     Gastroesophageal reflux disease with esophagitis (Chronic)    Current Assessment & Plan     Stable on protonix             Mental Health    Reactive depression (Chronic)    Current Assessment & Plan     Psychological condition is improving with treatment.  Continue current treatment regimen.  Psychological condition  will be reassessed at the next regular appointment.    Check sodium - hyponatremia in the past.             Musculoskeletal and Injuries    Primary osteoarthritis of right knee (Chronic)    Current Assessment & Plan     Plans for R knee replacement             Sleep    Primary insomnia (Chronic)    Current Assessment & Plan     She has tried to go down to 5 mg ambien and can't sleep.     We discussed risks: falls  She is aware and states she needs to continue because she can't function if she can't sleep.                [place] report is reviewed:    I reviewed the document in the electronic form in the Epic EMR.    On review, prescriptions filled under controlled contract with this office are consistent with what is prescribed.   The patient has been using the " same pharmacy.   There is not concern for aberrant behavior based on this ekasper review.    Follow Up {Instructions Charge Capture  Follow-up Communications :23}     Return in about 5 months (around 6/29/2021) for Medicare Wellness.  Patient was given instructions and counseling regarding her condition or for health maintenance advice. Please see specific information pulled into the AVS if appropriate.    Dragon disclaimer:   Much of this encounter note is an electronic transcription/translation of spoken language to printed text. The electronic translation of spoken language may permit erroneous, or at times, nonsensical words or phrases to be inadvertently transcribed; Although I have reviewed the note for such errors, some may still exist.     Additional Patient Counseling:       Patient Instructions     Topical CBD:   Try the website for Arthur     [email or web address]    Releaf [place]     15 ml jar is about $29.      Per reports, the jar will last quite a long time.

## 2021-01-29 NOTE — PATIENT INSTRUCTIONS
Topical CBD:   Try the website for Arthur     [email or web address]    Releaf [place]     15 ml jar is about $29.      Per reports, the jar will last quite a long time.

## 2021-01-29 NOTE — ASSESSMENT & PLAN NOTE
Psychological condition is improving with treatment.  Continue current treatment regimen.  Psychological condition  will be reassessed at the next regular appointment.    Check sodium - hyponatremia in the past.

## 2021-01-30 LAB
ALBUMIN SERPL-MCNC: 4.4 G/DL (ref 3.5–5.2)
ALBUMIN/GLOB SERPL: 1.5 G/DL
ALP SERPL-CCNC: 82 U/L (ref 39–117)
ALT SERPL-CCNC: 10 U/L (ref 1–33)
AST SERPL-CCNC: 19 U/L (ref 1–32)
BILIRUB SERPL-MCNC: 0.2 MG/DL (ref 0–1.2)
BUN SERPL-MCNC: 12 MG/DL (ref 8–23)
BUN/CREAT SERPL: 17.6 (ref 7–25)
CALCIUM SERPL-MCNC: 9.6 MG/DL (ref 8.6–10.5)
CHLORIDE SERPL-SCNC: 98 MMOL/L (ref 98–107)
CO2 SERPL-SCNC: 28.1 MMOL/L (ref 22–29)
CREAT SERPL-MCNC: 0.68 MG/DL (ref 0.57–1)
ERYTHROCYTE [DISTWIDTH] IN BLOOD BY AUTOMATED COUNT: 12.9 % (ref 12.3–15.4)
GLOBULIN SER CALC-MCNC: 2.9 GM/DL
GLUCOSE SERPL-MCNC: 93 MG/DL (ref 65–99)
HCT VFR BLD AUTO: 37 % (ref 34–46.6)
HGB BLD-MCNC: 11.9 G/DL (ref 12–15.9)
MCH RBC QN AUTO: 29.3 PG (ref 26.6–33)
MCHC RBC AUTO-ENTMCNC: 32.2 G/DL (ref 31.5–35.7)
MCV RBC AUTO: 91.1 FL (ref 79–97)
PLATELET # BLD AUTO: 241 10*3/MM3 (ref 140–450)
POTASSIUM SERPL-SCNC: 4.4 MMOL/L (ref 3.5–5.2)
PROT SERPL-MCNC: 7.3 G/DL (ref 6–8.5)
RBC # BLD AUTO: 4.06 10*6/MM3 (ref 3.77–5.28)
SODIUM SERPL-SCNC: 135 MMOL/L (ref 136–145)
WBC # BLD AUTO: 4.78 10*3/MM3 (ref 3.4–10.8)

## 2021-02-11 RX ORDER — PROMETHAZINE HYDROCHLORIDE 25 MG/1
TABLET ORAL
Qty: 20 TABLET | Refills: 0 | Status: SHIPPED | OUTPATIENT
Start: 2021-02-11 | End: 2021-08-03

## 2021-02-14 ENCOUNTER — IMMUNIZATION (OUTPATIENT)
Dept: VACCINE CLINIC | Facility: HOSPITAL | Age: 86
End: 2021-02-14

## 2021-02-14 PROCEDURE — 0001A: CPT | Performed by: INTERNAL MEDICINE

## 2021-02-14 PROCEDURE — 91300 HC SARSCOV02 VAC 30MCG/0.3ML IM: CPT | Performed by: INTERNAL MEDICINE

## 2021-02-17 DIAGNOSIS — F51.01 PRIMARY INSOMNIA: ICD-10-CM

## 2021-02-17 RX ORDER — ZOLPIDEM TARTRATE 10 MG/1
10 TABLET ORAL NIGHTLY PRN
Qty: 30 TABLET | Refills: 5 | Status: SHIPPED | OUTPATIENT
Start: 2021-02-17 | End: 2021-08-27 | Stop reason: SDUPTHER

## 2021-03-07 ENCOUNTER — APPOINTMENT (OUTPATIENT)
Dept: VACCINE CLINIC | Facility: HOSPITAL | Age: 86
End: 2021-03-07

## 2021-03-08 ENCOUNTER — IMMUNIZATION (OUTPATIENT)
Dept: VACCINE CLINIC | Facility: HOSPITAL | Age: 86
End: 2021-03-08

## 2021-03-08 PROCEDURE — 0002A: CPT | Performed by: INTERNAL MEDICINE

## 2021-03-08 PROCEDURE — 91300 HC SARSCOV02 VAC 30MCG/0.3ML IM: CPT | Performed by: INTERNAL MEDICINE

## 2021-03-16 DIAGNOSIS — K58.1 IRRITABLE BOWEL SYNDROME WITH CONSTIPATION: ICD-10-CM

## 2021-03-16 RX ORDER — DICYCLOMINE HCL 20 MG
TABLET ORAL
Qty: 60 TABLET | Refills: 5 | Status: SHIPPED | OUTPATIENT
Start: 2021-03-16 | End: 2022-02-03

## 2021-05-20 ENCOUNTER — OFFICE VISIT (OUTPATIENT)
Dept: ORTHOPEDIC SURGERY | Facility: CLINIC | Age: 86
End: 2021-05-20

## 2021-05-20 VITALS — BODY MASS INDEX: 20.91 KG/M2 | TEMPERATURE: 96.5 F | HEIGHT: 63 IN | WEIGHT: 118 LBS

## 2021-05-20 DIAGNOSIS — M17.11 PRIMARY OSTEOARTHRITIS OF RIGHT KNEE: ICD-10-CM

## 2021-05-20 DIAGNOSIS — R52 PAIN: Primary | ICD-10-CM

## 2021-05-20 PROBLEM — M17.9 OA (OSTEOARTHRITIS) OF KNEE: Status: ACTIVE | Noted: 2021-05-20

## 2021-05-20 PROCEDURE — 73562 X-RAY EXAM OF KNEE 3: CPT | Performed by: ORTHOPAEDIC SURGERY

## 2021-05-20 PROCEDURE — 99214 OFFICE O/P EST MOD 30 MIN: CPT | Performed by: ORTHOPAEDIC SURGERY

## 2021-05-20 RX ORDER — CHLORHEXIDINE GLUCONATE 500 MG/1
CLOTH TOPICAL 2 TIMES DAILY
Status: CANCELLED | OUTPATIENT
Start: 2021-05-20

## 2021-05-20 RX ORDER — CEFAZOLIN SODIUM 2 G/100ML
2 INJECTION, SOLUTION INTRAVENOUS ONCE
Status: CANCELLED | OUTPATIENT
Start: 2021-07-07 | End: 2021-05-20

## 2021-05-20 RX ORDER — MELOXICAM 15 MG/1
15 TABLET ORAL ONCE
Status: CANCELLED | OUTPATIENT
Start: 2021-07-07 | End: 2021-05-20

## 2021-05-20 RX ORDER — PREGABALIN 75 MG/1
150 CAPSULE ORAL ONCE
Status: CANCELLED | OUTPATIENT
Start: 2021-07-07 | End: 2021-05-20
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
## 2021-06-17 ENCOUNTER — TELEPHONE (OUTPATIENT)
Dept: ORTHOPEDIC SURGERY | Facility: CLINIC | Age: 86
End: 2021-06-17

## 2021-06-17 NOTE — TELEPHONE ENCOUNTER
Patient called and said that she has surgery next month and has to start PT but PT hasn't scheduled her yet because they never received the referral and could have possibly been the wrong office.

## 2021-06-18 ENCOUNTER — TRANSCRIBE ORDERS (OUTPATIENT)
Dept: PREADMISSION TESTING | Facility: HOSPITAL | Age: 86
End: 2021-06-18

## 2021-06-23 ENCOUNTER — TELEPHONE (OUTPATIENT)
Dept: ORTHOPEDIC SURGERY | Facility: CLINIC | Age: 86
End: 2021-06-23

## 2021-06-23 NOTE — TELEPHONE ENCOUNTER
Caller: EMILY PETER    Relationship: SELF    Best call back number: [phone]    What orders are you requesting (i.e. lab or imaging): PHYSICAL THERAPY FOR RT KNEE    Where will you receive your lab/imaging services: [facility]    Additional notes: PATIENT WOULD LIKE PHYSICAL THERAPY ORDER TO BE SENT TO   [facility]  PHONE: [phone]  FAX: [phone]

## 2021-06-24 ENCOUNTER — PRE-ADMISSION TESTING (OUTPATIENT)
Dept: PREADMISSION TESTING | Facility: HOSPITAL | Age: 86
End: 2021-06-24

## 2021-06-24 VITALS
HEIGHT: 63 IN | WEIGHT: 128.1 LBS | OXYGEN SATURATION: 98 % | RESPIRATION RATE: 20 BRPM | TEMPERATURE: 98.2 F | HEART RATE: 93 BPM | DIASTOLIC BLOOD PRESSURE: 75 MMHG | SYSTOLIC BLOOD PRESSURE: 149 MMHG | BODY MASS INDEX: 22.7 KG/M2

## 2021-06-24 DIAGNOSIS — M17.11 PRIMARY OSTEOARTHRITIS OF RIGHT KNEE: ICD-10-CM

## 2021-06-24 LAB
ANION GAP SERPL CALCULATED.3IONS-SCNC: 9.4 MMOL/L (ref 5–15)
BACTERIA UR QL AUTO: ABNORMAL /HPF
BILIRUB UR QL STRIP: NEGATIVE
BUN SERPL-MCNC: 13 MG/DL (ref 8–23)
BUN/CREAT SERPL: 16.7 (ref 7–25)
CALCIUM SPEC-SCNC: 9.7 MG/DL (ref 8.6–10.5)
CHLORIDE SERPL-SCNC: 99 MMOL/L (ref 98–107)
CLARITY UR: CLEAR
CO2 SERPL-SCNC: 24.6 MMOL/L (ref 22–29)
COLOR UR: YELLOW
CREAT SERPL-MCNC: 0.78 MG/DL (ref 0.57–1)
DEPRECATED RDW RBC AUTO: 46.6 FL (ref 37–54)
ERYTHROCYTE [DISTWIDTH] IN BLOOD BY AUTOMATED COUNT: 13.5 % (ref 12.3–15.4)
GFR SERPL CREATININE-BSD FRML MDRD: 70 ML/MIN/1.73
GLUCOSE SERPL-MCNC: 92 MG/DL (ref 65–99)
GLUCOSE UR STRIP-MCNC: NEGATIVE MG/DL
HCT VFR BLD AUTO: 37.6 % (ref 34–46.6)
HGB BLD-MCNC: 12.3 G/DL (ref 12–15.9)
HGB UR QL STRIP.AUTO: ABNORMAL
HYALINE CASTS UR QL AUTO: ABNORMAL /LPF
KETONES UR QL STRIP: NEGATIVE
LEUKOCYTE ESTERASE UR QL STRIP.AUTO: ABNORMAL
MCH RBC QN AUTO: 30.5 PG (ref 26.6–33)
MCHC RBC AUTO-ENTMCNC: 32.7 G/DL (ref 31.5–35.7)
MCV RBC AUTO: 93.3 FL (ref 79–97)
NITRITE UR QL STRIP: NEGATIVE
PH UR STRIP.AUTO: 5.5 [PH] (ref 5–8)
PLATELET # BLD AUTO: 251 10*3/MM3 (ref 140–450)
PMV BLD AUTO: 10.4 FL (ref 6–12)
POTASSIUM SERPL-SCNC: 4.6 MMOL/L (ref 3.5–5.2)
PROT UR QL STRIP: NEGATIVE
QT INTERVAL: 407 MS
RBC # BLD AUTO: 4.03 10*6/MM3 (ref 3.77–5.28)
RBC # UR: ABNORMAL /HPF
REF LAB TEST METHOD: ABNORMAL
SODIUM SERPL-SCNC: 133 MMOL/L (ref 136–145)
SP GR UR STRIP: 1.02 (ref 1–1.03)
SQUAMOUS #/AREA URNS HPF: ABNORMAL /HPF
UROBILINOGEN UR QL STRIP: ABNORMAL
WBC # BLD AUTO: 5.68 10*3/MM3 (ref 3.4–10.8)
WBC UR QL AUTO: ABNORMAL /HPF

## 2021-06-24 PROCEDURE — 81001 URINALYSIS AUTO W/SCOPE: CPT

## 2021-06-24 PROCEDURE — 93005 ELECTROCARDIOGRAM TRACING: CPT

## 2021-06-24 PROCEDURE — 85027 COMPLETE CBC AUTOMATED: CPT

## 2021-06-24 PROCEDURE — 80048 BASIC METABOLIC PNL TOTAL CA: CPT

## 2021-06-24 PROCEDURE — 63710000001 MUPIROCIN 2 % OINTMENT: Performed by: ORTHOPAEDIC SURGERY

## 2021-06-24 PROCEDURE — 36415 COLL VENOUS BLD VENIPUNCTURE: CPT

## 2021-06-24 PROCEDURE — 93010 ELECTROCARDIOGRAM REPORT: CPT | Performed by: INTERNAL MEDICINE

## 2021-06-24 PROCEDURE — A9270 NON-COVERED ITEM OR SERVICE: HCPCS | Performed by: ORTHOPAEDIC SURGERY

## 2021-06-24 PROCEDURE — 87086 URINE CULTURE/COLONY COUNT: CPT

## 2021-06-24 RX ORDER — DIPHENHYDRAMINE HCL 25 MG
25 CAPSULE ORAL EVERY 6 HOURS PRN
COMMUNITY

## 2021-06-24 RX ORDER — CHLORHEXIDINE GLUCONATE 500 MG/1
CLOTH TOPICAL 2 TIMES DAILY
Status: DISCONTINUED | OUTPATIENT
Start: 2021-06-24 | End: 2021-08-27

## 2021-06-24 ASSESSMENT — KOOS JR
KOOS JR SCORE: 42.281
KOOS JR SCORE: 18

## 2021-06-24 NOTE — DISCHARGE INSTRUCTIONS
Take the following medications the morning of surgery:  AMLODIPINE    ARRIVE TO MAIN OR DESK 7-7-21 AT 10:30AM      If you are on prescription narcotic pain medication to control your pain you may also take that medication the morning of surgery.    General Instructions:  • Do not eat solid food after midnight the night before surgery.  • You may drink clear liquids day of surgery but must stop at least one hour before your hospital arrival time.  • It is beneficial for you to have a clear drink that contains carbohydrates the day of surgery.  We suggest a 12 to 20 ounce bottle of Gatorade or Powerade for non-diabetic patients or a 12 to 20 ounce bottle of G2 or Powerade Zero for diabetic patients. (Pediatric patients, are not advised to drink a 12 to 20 ounce carbohydrate drink)    Clear liquids are liquids you can see through.  Nothing red in color.     Plain water                               Sports drinks  Sodas                                   Gelatin (Jell-O)  Fruit juices without pulp such as white grape juice and apple juice  Popsicles that contain no fruit or yogurt  Tea or coffee (no cream or milk added)  Gatorade / Powerade  G2 / Powerade Zero    • Infants may have breast milk up to four hours before surgery.  • Infants drinking formula may drink formula up to six hours before surgery.   • Patients who avoid smoking, chewing tobacco and alcohol for 4 weeks prior to surgery have a reduced risk of post-operative complications.  Quit smoking as many days before surgery as you can.  • Do not smoke, use chewing tobacco or drink alcohol the day of surgery.   • If applicable bring your C-PAP/ BI-PAP machine.  • Bring any papers given to you in the doctor’s office.  • Wear clean comfortable clothes.  • Do not wear contact lenses, false eyelashes or make-up.  Bring a case for your glasses.   • Bring crutches or walker if applicable.  • Remove all piercings.  Leave jewelry and any other valuables at home.  • Hair  extensions with metal clips must be removed prior to surgery.  • The Pre-Admission Testing nurse will instruct you to bring medications if unable to obtain an accurate list in Pre-Admission Testing.      Preventing a Surgical Site Infection:  • For 2 to 3 days before surgery, avoid shaving with a razor because the razor can irritate skin and make it easier to develop an infection.    • Any areas of open skin can increase the risk of a post-operative wound infection by allowing bacteria to enter and travel throughout the body.  Notify your surgeon if you have any skin wounds / rashes even if it is not near the expected surgical site.  The area will need assessed to determine if surgery should be delayed until it is healed.  • The night prior to surgery shower using a fresh bar of anti-bacterial soap (such as Dial) and clean washcloth.  Sleep in a clean bed with clean clothing.  Do not allow pets to sleep with you.  • Shower on the morning of surgery using a fresh bar of anti-bacterial soap (such as Dial) and clean washcloth.  Dry with a clean towel and dress in clean clothing.  • Ask your surgeon if you will be receiving antibiotics prior to surgery.  • Make sure you, your family, and all healthcare providers clean their hands with soap and water or an alcohol based hand  before caring for you or your wound.    Day of surgery:  Your arrival time is approximately two hours before your scheduled surgery time.  Upon arrival, a Pre-op nurse and Anesthesiologist will review your health history, obtain vital signs, and answer questions you may have.  The only belongings needed at this time will be a list of your home medications and if applicable your C-PAP/BI-PAP machine.  A Pre-op nurse will start an IV and you may receive medication in preparation for surgery, including something to help you relax.     Please be aware that surgery does come with discomfort.  We want to make every effort to control your  discomfort so please discuss any uncontrolled symptoms with your nurse.   Your doctor will most likely have prescribed pain medications.      If you are going home after surgery you will receive individualized written care instructions before being discharged.  A responsible adult must drive you to and from the hospital on the day of your surgery and stay with you for 24 hours.  Discharge prescriptions can be filled by the hospital pharmacy during regular pharmacy hours.  If you are having surgery late in the day/evening your prescription may be e-prescribed to your pharmacy.  Please verify your pharmacy hours or chose a 24 hour pharmacy to avoid not having access to your prescription because your pharmacy has closed for the day.    If you are staying overnight following surgery, you will be transported to your hospital room following the recovery period.  [facility] has all private rooms.    If you have any questions please call Pre-Admission Testing at [phone].  Deductibles and co-payments are collected on the day of service. Please be prepared to pay the required co-pay, deductible or deposit on the day of service as defined by your plan.    Patient Education for Self-Quarantine Process    Following your COVID testing, we strongly recommend that you do not leave your home after you have been tested for COVID except to get medical care. This includes not going to work, school or to public areas.  If this is not possible for you to do please limit your activities to only required outings.  Be sure to wear a mask when you are with other people, practice social distancing and wash your hands frequently.      The following items provide additional details to keep you safe.  • Wash your hands with soap and water frequently for at least 20 seconds.   • Avoid touching your eyes, nose and mouth with unwashed hands.  • Do not share anything - utensils, towels, food from the same bowl.   • Have your own  utensils, drinking glass, dishes, towels and bedding.   • Do not have visitors.   • Do use FaceTime to stay in touch with family and friends.  • You should stay in a specific room away from others if possible.   • Stay at least 6 feet away from others in the home if you cannot have a dedicated room to yourself.   • Do not snuggle with your pet. While the CDC says there is no evidence that pets can spread COVID-19 or be infected from humans, it is probably best to avoid “petting, snuggling, being kissed or licked and sharing food (during self-quarantine)”, according to the CDC.   • Sanitize household surfaces daily. Include all high touch areas (door handles, light switches, phones, countertops, etc.)  • Do not share a bathroom with others, if possible.   • Wear a mask around others in your home if you are unable to stay in a separate room or 6 feet apart. If  you are unable to wear a mask, have your family member wear a mask if they must be within 6 feet of you.   Call your surgeon immediately if you experience any of the following symptoms:  • Sore Throat  • Shortness of Breath or difficulty breathing  • Cough  • Chills  • Body soreness or muscle pain  • Headache  • Fever  • New loss of taste or smell  Do not arrive for your surgery ill.  Your procedure will need to be rescheduled to another time.  You will need to call your physician before the day of surgery to avoid any unnecessary exposure to hospital staff as well as other patients.     CHLORHEXIDINE CLOTH INSTRUCTIONS  The morning of surgery follow these instructions using the Chlorhexidine cloths you've been given.  These steps reduce bacteria on the body.  Do not use the cloths near your eyes, ears mouth, genitalia or on open wounds.  Throw the cloths away after use but do not try to flush them down a toilet.      • Open and remove one cloth at a time from the package.    • Leave the cloth unfolded and begin the bathing.  • Massage the skin with the cloths  using gentle pressure to remove bacteria.  Do not scrub harshly.   • Follow the steps below with one 2% CHG cloth per area (6 total cloths).  • One cloth for neck, shoulders and chest.  • One cloth for both arms, hands, fingers and underarms (do underarms last).  • One cloth for the abdomen followed by groin.  • One cloth for right leg and foot including between the toes.  • One cloth for left leg and foot including between the toes.  • The last cloth is to be used for the back of the neck, back and buttocks.    Allow the CHG to air dry 3 minutes on the skin which will give it time to work and decrease the chance of irritation.  The skin may feel sticky until it is dry.  Do not rinse with water or any other liquid or you will lose the beneficial effects of the CHG.  If mild skin irritation occurs, do rinse the skin to remove the CHG.  Report this to the nurse at time of admission.  Do not apply lotions, creams, ointments, deodorants or perfumes after using the clothes. Dress in clean clothes before coming to the hospital.    BACTROBAN NASAL OINTMENT  There are many germs normally in your nose. Bactroban is an ointment that will help reduce these germs. Please follow these instructions for Bactroban use:      ____The day before surgery in the morning  Date________    ____The day before surgery in the evening              Date________    ____The day of surgery in the morning    Date________    **Squirt ½ package of Bactroban Ointment onto a cotton applicator and apply to inside of 1st nostril.  Squirt the remaining Bactroban and apply to the inside of the other nostril.

## 2021-06-24 NOTE — TELEPHONE ENCOUNTER
I called patient and left a voicemail on cell phone [phone] for patient to call me back about her Physical Therapy order. The order has been faxed over to [facility] [phone].

## 2021-06-25 LAB — BACTERIA SPEC AEROBE CULT: NORMAL

## 2021-07-01 ENCOUNTER — TELEPHONE (OUTPATIENT)
Dept: ORTHOPEDIC SURGERY | Facility: CLINIC | Age: 86
End: 2021-07-01

## 2021-07-01 NOTE — TELEPHONE ENCOUNTER
I spoke with mll about rescheduling h&p appointment from 7/1/21. Patient is seeing car on 7/2/21.   show

## 2021-07-02 ENCOUNTER — OFFICE VISIT (OUTPATIENT)
Dept: ORTHOPEDIC SURGERY | Facility: CLINIC | Age: 86
End: 2021-07-02

## 2021-07-02 VITALS — HEIGHT: 63 IN | WEIGHT: 128 LBS | TEMPERATURE: 97.5 F | BODY MASS INDEX: 22.68 KG/M2

## 2021-07-02 DIAGNOSIS — R52 PAIN: Primary | ICD-10-CM

## 2021-07-02 DIAGNOSIS — M17.11 PRIMARY OSTEOARTHRITIS OF RIGHT KNEE: ICD-10-CM

## 2021-07-02 PROCEDURE — 77077 JOINT SURVEY SINGLE VIEW: CPT | Performed by: NURSE PRACTITIONER

## 2021-07-02 PROCEDURE — S0260 H&P FOR SURGERY: HCPCS | Performed by: NURSE PRACTITIONER

## 2021-07-02 NOTE — H&P (VIEW-ONLY)
Patient: Veronica Babb    Date of Admission: 07/07/2021    YOB: 1935    Medical Record Number: [id number]    Admitting Physician: Dr. Jian Hernandez    Reason for Admission: End Stage Right Knee OA    History of Present Illness: 86 y.o. female presents with severe end stage knee osteoarthritis which has not been responsive to the full compliment of conservative measures. Despite conservative attempts, there is still severe, constant activity limiting pain. Given the severity of the pain, the patient has elected to proceed with knee replacement.    Allergies: No Known Allergies      Current Medications:  Home Medications:    Current Outpatient Medications on File Prior to Visit   Medication Sig   • amLODIPine (NORVASC) 10 MG tablet TAKE 1 TABLET BY MOUTH DAILY   • Chlorhexidine Gluconate 2 % pads Apply  topically. AS DIRECTED PAT   • dicyclomine (BENTYL) 20 MG tablet TAKE 1 TABLET BY MOUTH EVERY 6 HOURS (Patient taking differently: Take 20 mg by mouth Every 6 (Six) Hours As Needed.)   • diphenhydrAMINE (BENADRYL) 25 mg capsule Take 25 mg by mouth Every 6 (Six) Hours As Needed for Itching.   • escitalopram (LEXAPRO) 20 MG tablet TAKE 1 TABLET BY MOUTH DAILY   • lisinopril (PRINIVIL,ZESTRIL) 40 MG tablet TAKE 1 TABLET BY MOUTH DAILY   • mupirocin (BACTROBAN) 2 % ointment Apply  topically to the appropriate area as directed 3 (Three) Times a Day. AS DIRECTED PAT   • promethazine (PHENERGAN) 25 MG tablet TAKE 1/2 TABLET BY MOUTH EVERY 8 HOURS AS NEEDED FOR NAUSEA OR VOMITING (Patient taking differently: 25 mg Every 6 (Six) Hours As Needed. MIGRAINES)   • SUMAtriptan (IMITREX) 100 MG tablet Take one tablet at onset of headache. May repeat dose one time in 2 hours if headache not relieved. (Patient taking differently: Take 100 mg by mouth 1 (One) Time As Needed. Take one tablet at onset of headache. May repeat dose one time in 2 hours if headache not relieved.)   • zolpidem (AMBIEN) 10 MG tablet TAKE 1 TABLET  "BY MOUTH AT NIGHT AS NEEDED FOR SLEEP (Patient taking differently: Take 10 mg by mouth Every Night.)     Current Facility-Administered Medications on File Prior to Visit   Medication   • Chlorhexidine Gluconate Cloth 2 % pads     PRN Meds:.    PMH:     Past Medical History:   Diagnosis Date   • COVID-19 vaccine series completed     MARCH 8, 2021   • Hypertension    • IBS (irritable bowel syndrome)    • Insomnia    • Limited joint range of motion (ROM)     RIGHT KNEE   • Migraines    • Murmur     STATES WAS TOLD THIS WAS \"SLIGHT\"   • Right knee pain    • Weakness     RIGHT KNEE       PF/Surg/Soc Hx:     Past Surgical History:   Procedure Laterality Date   • BREAST AUGMENTATION     • CATARACT EXTRACTION, BILATERAL     • COLONOSCOPY  01/19/2011   • HIP ARTHROPLASTY Right    • HYSTERECTOMY          Social History     Occupational History   • Not on file   Tobacco Use   • Smoking status: Former Smoker     Packs/day: 0.25     Years: 5.00     Pack years: 1.25   • Smokeless tobacco: Never Used   • Tobacco comment: IN HER 20'S   Vaping Use   • Vaping Use: Never used   Substance and Sexual Activity   • Alcohol use: No   • Drug use: Never   • Sexual activity: Never      Social History     Social History Narrative   • Not on file        Family History   Problem Relation Age of Onset   • Malig Hyperthermia Neg Hx          Review of Systems:   A 14 point review of systems was performed, pertinent positives discussed above, all other systems are negative    Physical Exam: 86 y.o. female  Vital Signs :   Vitals:    07/02/21 1056   Temp: 97.5 °F (36.4 °C)   Weight: 58.1 kg (128 lb)   Height: 160 cm (63\")   PainSc:   7     General: Alert and Oriented x 3, No acute distress.  Psych: mood and affect appropriate; recent and remote memory intact  Eyes: conjunctiva clear; pupils equally round and reactive, sclera nonicteric  CV: no peripheral edema  Resp: normal respiratory effort  Skin: no rashes or wounds; normal " turgor  Musculosketetal; pain and crepitance with knee range of motion  Vascular: palpable distal pulses    Xrays:  -3 views (AP, lateral, and sunrise) were reviewed demonstrating end-stage OA with bone on bone articulation.  -A full length AP xray was ordered today for purposes of operative alignment demonstrating end stage arthritic findings. There are no previous full length films for review    Assessment:  End-stage Right knee osteoarthritis. Conservative measures have failed.      Plan:  The plan is to proceed with Right Total Knee Replacement. The patient voiced understanding of the risks, benefits, and alternative forms of treatment that were discussed with Dr Hernandez at the time of scheduling. 1-2 hospital stay with home health PT.    Sixto Ga, APRN  7/2/2021

## 2021-07-06 ENCOUNTER — LAB (OUTPATIENT)
Dept: LAB | Facility: HOSPITAL | Age: 86
End: 2021-07-06

## 2021-07-06 LAB — SARS-COV-2 ORF1AB RESP QL NAA+PROBE: NOT DETECTED

## 2021-07-06 PROCEDURE — C9803 HOPD COVID-19 SPEC COLLECT: HCPCS

## 2021-07-06 PROCEDURE — U0005 INFEC AGEN DETEC AMPLI PROBE: HCPCS

## 2021-07-06 PROCEDURE — U0004 COV-19 TEST NON-CDC HGH THRU: HCPCS

## 2021-07-07 ENCOUNTER — HOSPITAL ENCOUNTER (OUTPATIENT)
Facility: HOSPITAL | Age: 86
Discharge: HOME HEALTH | End: 2021-07-09
Attending: ORTHOPAEDIC SURGERY | Admitting: ORTHOPAEDIC SURGERY

## 2021-07-07 ENCOUNTER — APPOINTMENT (OUTPATIENT)
Dept: GENERAL RADIOLOGY | Facility: HOSPITAL | Age: 86
End: 2021-07-07

## 2021-07-07 ENCOUNTER — ANESTHESIA EVENT (OUTPATIENT)
Dept: PERIOP | Facility: HOSPITAL | Age: 86
End: 2021-07-07

## 2021-07-07 ENCOUNTER — ANESTHESIA (OUTPATIENT)
Dept: PERIOP | Facility: HOSPITAL | Age: 86
End: 2021-07-07

## 2021-07-07 DIAGNOSIS — M17.11 PRIMARY OSTEOARTHRITIS OF RIGHT KNEE: ICD-10-CM

## 2021-07-07 DIAGNOSIS — Z96.651 S/P TKR (TOTAL KNEE REPLACEMENT), RIGHT: Primary | ICD-10-CM

## 2021-07-07 PROCEDURE — A9270 NON-COVERED ITEM OR SERVICE: HCPCS | Performed by: NURSE PRACTITIONER

## 2021-07-07 PROCEDURE — A9270 NON-COVERED ITEM OR SERVICE: HCPCS | Performed by: ORTHOPAEDIC SURGERY

## 2021-07-07 PROCEDURE — 25010000002 MIDAZOLAM PER 1 MG: Performed by: ANESTHESIOLOGY

## 2021-07-07 PROCEDURE — 27447 TOTAL KNEE ARTHROPLASTY: CPT | Performed by: ORTHOPAEDIC SURGERY

## 2021-07-07 PROCEDURE — 25010000002 FENTANYL CITRATE (PF) 50 MCG/ML SOLUTION: Performed by: NURSE ANESTHETIST, CERTIFIED REGISTERED

## 2021-07-07 PROCEDURE — 25010000002 DEXAMETHASONE PER 1 MG: Performed by: NURSE ANESTHETIST, CERTIFIED REGISTERED

## 2021-07-07 PROCEDURE — 63710000001 HYDROCODONE-ACETAMINOPHEN 7.5-325 MG TABLET: Performed by: NURSE PRACTITIONER

## 2021-07-07 PROCEDURE — G0378 HOSPITAL OBSERVATION PER HR: HCPCS

## 2021-07-07 PROCEDURE — 25010000003 BUPIVACAINE LIPOSOME 1.3 % SUSPENSION 20 ML VIAL: Performed by: ORTHOPAEDIC SURGERY

## 2021-07-07 PROCEDURE — C1889 IMPLANT/INSERT DEVICE, NOC: HCPCS | Performed by: ORTHOPAEDIC SURGERY

## 2021-07-07 PROCEDURE — 63710000001 PREGABALIN 75 MG CAPSULE: Performed by: ORTHOPAEDIC SURGERY

## 2021-07-07 PROCEDURE — C9290 INJ, BUPIVACAINE LIPOSOME: HCPCS | Performed by: ORTHOPAEDIC SURGERY

## 2021-07-07 PROCEDURE — 63710000001 ZOLPIDEM 5 MG TABLET: Performed by: NURSE PRACTITIONER

## 2021-07-07 PROCEDURE — 73560 X-RAY EXAM OF KNEE 1 OR 2: CPT

## 2021-07-07 PROCEDURE — 25010000002 PHENYLEPHRINE PER 1 ML: Performed by: NURSE ANESTHETIST, CERTIFIED REGISTERED

## 2021-07-07 PROCEDURE — C1776 JOINT DEVICE (IMPLANTABLE): HCPCS | Performed by: ORTHOPAEDIC SURGERY

## 2021-07-07 PROCEDURE — 25010000003 CEFAZOLIN IN DEXTROSE 2-4 GM/100ML-% SOLUTION: Performed by: NURSE PRACTITIONER

## 2021-07-07 PROCEDURE — 63710000001 MELOXICAM 15 MG TABLET: Performed by: ORTHOPAEDIC SURGERY

## 2021-07-07 PROCEDURE — 25010000002 VANCOMYCIN 750 MG RECONSTITUTED SOLUTION: Performed by: ORTHOPAEDIC SURGERY

## 2021-07-07 PROCEDURE — C1713 ANCHOR/SCREW BN/BN,TIS/BN: HCPCS | Performed by: ORTHOPAEDIC SURGERY

## 2021-07-07 PROCEDURE — 63710000001 ESCITALOPRAM 20 MG TABLET: Performed by: NURSE PRACTITIONER

## 2021-07-07 PROCEDURE — 63710000001 LISINOPRIL 20 MG TABLET: Performed by: NURSE PRACTITIONER

## 2021-07-07 PROCEDURE — 25010000002 PROPOFOL 10 MG/ML EMULSION: Performed by: NURSE ANESTHETIST, CERTIFIED REGISTERED

## 2021-07-07 PROCEDURE — 25010000003 CEFAZOLIN IN DEXTROSE 2-4 GM/100ML-% SOLUTION: Performed by: ORTHOPAEDIC SURGERY

## 2021-07-07 PROCEDURE — 25010000002 ONDANSETRON PER 1 MG: Performed by: NURSE ANESTHETIST, CERTIFIED REGISTERED

## 2021-07-07 DEVICE — IMPLANTABLE DEVICE: Type: IMPLANTABLE DEVICE | Site: KNEE | Status: FUNCTIONAL

## 2021-07-07 DEVICE — KNOTLESS TISSUE CONTROL DEVICE, UNDYED UNIDIRECTIONAL (ANTIBACTERIAL) SYNTHETIC ABSORBABLE DEVICE
Type: IMPLANTABLE DEVICE | Site: KNEE | Status: FUNCTIONAL
Brand: STRATAFIX

## 2021-07-07 DEVICE — LEGION POSTERIOR STABILIZED                                    OXINIUM FEMORAL SIZE 3 RIGHT
Type: IMPLANTABLE DEVICE | Site: KNEE | Status: FUNCTIONAL
Brand: LEGION

## 2021-07-07 DEVICE — GENESIS II NON-POROUS TIBIAL                                    BASEPLATE SIZE 3 RIGHT
Type: IMPLANTABLE DEVICE | Site: KNEE | Status: FUNCTIONAL
Brand: GENESIS II

## 2021-07-07 DEVICE — VIOLET ANTIBACTERIAL POLYDIOXANONE, KNOTLESS TISSUE CONTROL DEVICE
Type: IMPLANTABLE DEVICE | Site: KNEE | Status: FUNCTIONAL
Brand: STRATAFIX

## 2021-07-07 DEVICE — PALACOS® R IS A FAST-CURING, RADIOPAQUE, POLY(METHYL METHACRYLATE)-BASED BONE CEMENT.PALACOS ® R CONTAINS THE X-RAY CONTRAST MEDIUM ZIRCONIUM DIOXIDE. TO IMPROVE VISIBILITY IN THE SURGICAL FIELD PALACOS ® R HAS BEEN COLOURED WITH CHLOROPHYLL (E141). THE BONE CEMENT IS PREPARED DIRECTLY BEFORE USE BY MIXING A POLYMER POWDER COMPONENT WITH A LIQUID MONOMER COMPONENT. A DUCTILE DOUGH FORMS WHICH CURES WITHIN A FEW MINUTES.
Type: IMPLANTABLE DEVICE | Site: KNEE | Status: FUNCTIONAL
Brand: PALACOS®

## 2021-07-07 DEVICE — LEGION POSTERIOR STABILIZED HIGH                                    FLEX HIGHLY CROSS LINKED                                    POLYETHYLENE SIZE 3-4 15MM
Type: IMPLANTABLE DEVICE | Site: KNEE | Status: FUNCTIONAL
Brand: LEGION

## 2021-07-07 DEVICE — GENESIS II BICONVEX PATELLA 26MM
Type: IMPLANTABLE DEVICE | Site: KNEE | Status: FUNCTIONAL
Brand: GENESIS II

## 2021-07-07 RX ORDER — EPHEDRINE SULFATE 50 MG/ML
5 INJECTION, SOLUTION INTRAVENOUS ONCE AS NEEDED
Status: DISCONTINUED | OUTPATIENT
Start: 2021-07-07 | End: 2021-07-07 | Stop reason: HOSPADM

## 2021-07-07 RX ORDER — LIDOCAINE HYDROCHLORIDE 20 MG/ML
INJECTION, SOLUTION INFILTRATION; PERINEURAL AS NEEDED
Status: DISCONTINUED | OUTPATIENT
Start: 2021-07-07 | End: 2021-07-07 | Stop reason: SURG

## 2021-07-07 RX ORDER — DICYCLOMINE HYDROCHLORIDE 10 MG/1
20 CAPSULE ORAL 4 TIMES DAILY PRN
Status: DISCONTINUED | OUTPATIENT
Start: 2021-07-07 | End: 2021-07-09 | Stop reason: HOSPADM

## 2021-07-07 RX ORDER — FENTANYL CITRATE 50 UG/ML
50 INJECTION, SOLUTION INTRAMUSCULAR; INTRAVENOUS
Status: DISCONTINUED | OUTPATIENT
Start: 2021-07-07 | End: 2021-07-07 | Stop reason: HOSPADM

## 2021-07-07 RX ORDER — DIPHENHYDRAMINE HCL 25 MG
25 CAPSULE ORAL EVERY 6 HOURS PRN
Status: DISCONTINUED | OUTPATIENT
Start: 2021-07-07 | End: 2021-07-09 | Stop reason: HOSPADM

## 2021-07-07 RX ORDER — PREGABALIN 75 MG/1
150 CAPSULE ORAL ONCE
Status: COMPLETED | OUTPATIENT
Start: 2021-07-07 | End: 2021-07-07

## 2021-07-07 RX ORDER — DIPHENHYDRAMINE HYDROCHLORIDE 50 MG/ML
12.5 INJECTION INTRAMUSCULAR; INTRAVENOUS
Status: DISCONTINUED | OUTPATIENT
Start: 2021-07-07 | End: 2021-07-07 | Stop reason: HOSPADM

## 2021-07-07 RX ORDER — CEFAZOLIN SODIUM 2 G/100ML
2 INJECTION, SOLUTION INTRAVENOUS EVERY 8 HOURS
Status: COMPLETED | OUTPATIENT
Start: 2021-07-07 | End: 2021-07-08

## 2021-07-07 RX ORDER — NALOXONE HCL 0.4 MG/ML
0.2 VIAL (ML) INJECTION AS NEEDED
Status: DISCONTINUED | OUTPATIENT
Start: 2021-07-07 | End: 2021-07-07 | Stop reason: HOSPADM

## 2021-07-07 RX ORDER — HYDROCODONE BITARTRATE AND ACETAMINOPHEN 7.5; 325 MG/1; MG/1
1 TABLET ORAL EVERY 4 HOURS PRN
Status: DISCONTINUED | OUTPATIENT
Start: 2021-07-07 | End: 2021-07-09 | Stop reason: HOSPADM

## 2021-07-07 RX ORDER — ONDANSETRON 2 MG/ML
4 INJECTION INTRAMUSCULAR; INTRAVENOUS ONCE AS NEEDED
Status: DISCONTINUED | OUTPATIENT
Start: 2021-07-07 | End: 2021-07-07 | Stop reason: HOSPADM

## 2021-07-07 RX ORDER — MELOXICAM 15 MG/1
15 TABLET ORAL DAILY
Status: DISCONTINUED | OUTPATIENT
Start: 2021-07-08 | End: 2021-07-09 | Stop reason: HOSPADM

## 2021-07-07 RX ORDER — ZOLPIDEM TARTRATE 5 MG/1
10 TABLET ORAL NIGHTLY PRN
Status: DISCONTINUED | OUTPATIENT
Start: 2021-07-07 | End: 2021-07-09 | Stop reason: HOSPADM

## 2021-07-07 RX ORDER — HYDROCODONE BITARTRATE AND ACETAMINOPHEN 7.5; 325 MG/1; MG/1
1 TABLET ORAL ONCE AS NEEDED
Status: DISCONTINUED | OUTPATIENT
Start: 2021-07-07 | End: 2021-07-07 | Stop reason: HOSPADM

## 2021-07-07 RX ORDER — SODIUM CHLORIDE 0.9 % (FLUSH) 0.9 %
3 SYRINGE (ML) INJECTION EVERY 12 HOURS SCHEDULED
Status: DISCONTINUED | OUTPATIENT
Start: 2021-07-07 | End: 2021-07-07 | Stop reason: HOSPADM

## 2021-07-07 RX ORDER — PROMETHAZINE HYDROCHLORIDE 25 MG/1
25 TABLET ORAL ONCE AS NEEDED
Status: DISCONTINUED | OUTPATIENT
Start: 2021-07-07 | End: 2021-07-07 | Stop reason: HOSPADM

## 2021-07-07 RX ORDER — LISINOPRIL 20 MG/1
40 TABLET ORAL DAILY
Status: DISCONTINUED | OUTPATIENT
Start: 2021-07-07 | End: 2021-07-09 | Stop reason: HOSPADM

## 2021-07-07 RX ORDER — IBUPROFEN 600 MG/1
600 TABLET ORAL ONCE AS NEEDED
Status: DISCONTINUED | OUTPATIENT
Start: 2021-07-07 | End: 2021-07-07 | Stop reason: HOSPADM

## 2021-07-07 RX ORDER — SODIUM CHLORIDE, SODIUM LACTATE, POTASSIUM CHLORIDE, CALCIUM CHLORIDE 600; 310; 30; 20 MG/100ML; MG/100ML; MG/100ML; MG/100ML
9 INJECTION, SOLUTION INTRAVENOUS CONTINUOUS
Status: DISCONTINUED | OUTPATIENT
Start: 2021-07-07 | End: 2021-07-07 | Stop reason: HOSPADM

## 2021-07-07 RX ORDER — ESCITALOPRAM OXALATE 20 MG/1
20 TABLET ORAL DAILY
Status: DISCONTINUED | OUTPATIENT
Start: 2021-07-07 | End: 2021-07-09 | Stop reason: HOSPADM

## 2021-07-07 RX ORDER — HYDROCODONE BITARTRATE AND ACETAMINOPHEN 7.5; 325 MG/1; MG/1
2 TABLET ORAL EVERY 4 HOURS PRN
Status: DISCONTINUED | OUTPATIENT
Start: 2021-07-07 | End: 2021-07-09 | Stop reason: HOSPADM

## 2021-07-07 RX ORDER — HYDROMORPHONE HYDROCHLORIDE 1 MG/ML
0.5 INJECTION, SOLUTION INTRAMUSCULAR; INTRAVENOUS; SUBCUTANEOUS
Status: DISCONTINUED | OUTPATIENT
Start: 2021-07-07 | End: 2021-07-07 | Stop reason: HOSPADM

## 2021-07-07 RX ORDER — LIDOCAINE HYDROCHLORIDE 10 MG/ML
0.5 INJECTION, SOLUTION EPIDURAL; INFILTRATION; INTRACAUDAL; PERINEURAL ONCE AS NEEDED
Status: DISCONTINUED | OUTPATIENT
Start: 2021-07-07 | End: 2021-07-07 | Stop reason: HOSPADM

## 2021-07-07 RX ORDER — ONDANSETRON 2 MG/ML
INJECTION INTRAMUSCULAR; INTRAVENOUS AS NEEDED
Status: DISCONTINUED | OUTPATIENT
Start: 2021-07-07 | End: 2021-07-07 | Stop reason: SURG

## 2021-07-07 RX ORDER — ONDANSETRON 4 MG/1
4 TABLET, FILM COATED ORAL EVERY 6 HOURS PRN
Status: DISCONTINUED | OUTPATIENT
Start: 2021-07-07 | End: 2021-07-09 | Stop reason: HOSPADM

## 2021-07-07 RX ORDER — BUPIVACAINE HYDROCHLORIDE 7.5 MG/ML
INJECTION, SOLUTION EPIDURAL; RETROBULBAR
Status: COMPLETED | OUTPATIENT
Start: 2021-07-07 | End: 2021-07-07

## 2021-07-07 RX ORDER — SODIUM CHLORIDE 0.9 % (FLUSH) 0.9 %
3-10 SYRINGE (ML) INJECTION AS NEEDED
Status: DISCONTINUED | OUTPATIENT
Start: 2021-07-07 | End: 2021-07-07 | Stop reason: HOSPADM

## 2021-07-07 RX ORDER — ONDANSETRON 2 MG/ML
4 INJECTION INTRAMUSCULAR; INTRAVENOUS EVERY 6 HOURS PRN
Status: DISCONTINUED | OUTPATIENT
Start: 2021-07-07 | End: 2021-07-09 | Stop reason: HOSPADM

## 2021-07-07 RX ORDER — FENTANYL CITRATE 50 UG/ML
INJECTION, SOLUTION INTRAMUSCULAR; INTRAVENOUS AS NEEDED
Status: DISCONTINUED | OUTPATIENT
Start: 2021-07-07 | End: 2021-07-07 | Stop reason: SURG

## 2021-07-07 RX ORDER — MIDAZOLAM HYDROCHLORIDE 1 MG/ML
0.5 INJECTION INTRAMUSCULAR; INTRAVENOUS
Status: DISCONTINUED | OUTPATIENT
Start: 2021-07-07 | End: 2021-07-07 | Stop reason: HOSPADM

## 2021-07-07 RX ORDER — HYDRALAZINE HYDROCHLORIDE 20 MG/ML
5 INJECTION INTRAMUSCULAR; INTRAVENOUS
Status: DISCONTINUED | OUTPATIENT
Start: 2021-07-07 | End: 2021-07-07 | Stop reason: HOSPADM

## 2021-07-07 RX ORDER — ASPIRIN 81 MG/1
81 TABLET ORAL EVERY 12 HOURS SCHEDULED
Status: DISCONTINUED | OUTPATIENT
Start: 2021-07-08 | End: 2021-07-09 | Stop reason: HOSPADM

## 2021-07-07 RX ORDER — AMLODIPINE BESYLATE 10 MG/1
10 TABLET ORAL DAILY
Status: DISCONTINUED | OUTPATIENT
Start: 2021-07-07 | End: 2021-07-09 | Stop reason: HOSPADM

## 2021-07-07 RX ORDER — OXYCODONE AND ACETAMINOPHEN 10; 325 MG/1; MG/1
1 TABLET ORAL EVERY 4 HOURS PRN
Status: DISCONTINUED | OUTPATIENT
Start: 2021-07-07 | End: 2021-07-07 | Stop reason: HOSPADM

## 2021-07-07 RX ORDER — CEFAZOLIN SODIUM 2 G/100ML
2 INJECTION, SOLUTION INTRAVENOUS ONCE
Status: COMPLETED | OUTPATIENT
Start: 2021-07-07 | End: 2021-07-07

## 2021-07-07 RX ORDER — FLUMAZENIL 0.1 MG/ML
0.2 INJECTION INTRAVENOUS AS NEEDED
Status: DISCONTINUED | OUTPATIENT
Start: 2021-07-07 | End: 2021-07-07 | Stop reason: HOSPADM

## 2021-07-07 RX ORDER — TRANEXAMIC ACID 100 MG/ML
INJECTION, SOLUTION INTRAVENOUS AS NEEDED
Status: DISCONTINUED | OUTPATIENT
Start: 2021-07-07 | End: 2021-07-07 | Stop reason: SURG

## 2021-07-07 RX ORDER — MELOXICAM 15 MG/1
15 TABLET ORAL ONCE
Status: COMPLETED | OUTPATIENT
Start: 2021-07-07 | End: 2021-07-07

## 2021-07-07 RX ORDER — DEXAMETHASONE SODIUM PHOSPHATE 4 MG/ML
INJECTION, SOLUTION INTRA-ARTICULAR; INTRALESIONAL; INTRAMUSCULAR; INTRAVENOUS; SOFT TISSUE AS NEEDED
Status: DISCONTINUED | OUTPATIENT
Start: 2021-07-07 | End: 2021-07-07 | Stop reason: SURG

## 2021-07-07 RX ORDER — LABETALOL HYDROCHLORIDE 5 MG/ML
5 INJECTION, SOLUTION INTRAVENOUS
Status: DISCONTINUED | OUTPATIENT
Start: 2021-07-07 | End: 2021-07-07 | Stop reason: HOSPADM

## 2021-07-07 RX ORDER — POLYETHYLENE GLYCOL 3350 17 G/17G
17 POWDER, FOR SOLUTION ORAL 2 TIMES DAILY
Status: DISCONTINUED | OUTPATIENT
Start: 2021-07-07 | End: 2021-07-09 | Stop reason: HOSPADM

## 2021-07-07 RX ORDER — PROPOFOL 10 MG/ML
VIAL (ML) INTRAVENOUS CONTINUOUS PRN
Status: DISCONTINUED | OUTPATIENT
Start: 2021-07-07 | End: 2021-07-07 | Stop reason: SURG

## 2021-07-07 RX ORDER — MAGNESIUM HYDROXIDE 1200 MG/15ML
LIQUID ORAL AS NEEDED
Status: DISCONTINUED | OUTPATIENT
Start: 2021-07-07 | End: 2021-07-07 | Stop reason: HOSPADM

## 2021-07-07 RX ORDER — PROPOFOL 10 MG/ML
VIAL (ML) INTRAVENOUS AS NEEDED
Status: DISCONTINUED | OUTPATIENT
Start: 2021-07-07 | End: 2021-07-07 | Stop reason: SURG

## 2021-07-07 RX ORDER — FAMOTIDINE 10 MG/ML
20 INJECTION, SOLUTION INTRAVENOUS ONCE
Status: COMPLETED | OUTPATIENT
Start: 2021-07-07 | End: 2021-07-07

## 2021-07-07 RX ORDER — PROMETHAZINE HYDROCHLORIDE 25 MG/1
25 SUPPOSITORY RECTAL ONCE AS NEEDED
Status: DISCONTINUED | OUTPATIENT
Start: 2021-07-07 | End: 2021-07-07 | Stop reason: HOSPADM

## 2021-07-07 RX ORDER — DIPHENHYDRAMINE HCL 25 MG
25 CAPSULE ORAL
Status: DISCONTINUED | OUTPATIENT
Start: 2021-07-07 | End: 2021-07-07 | Stop reason: HOSPADM

## 2021-07-07 RX ADMIN — MELOXICAM 15 MG: 15 TABLET ORAL at 11:21

## 2021-07-07 RX ADMIN — TRANEXAMIC ACID 1000 MG: 1 INJECTION, SOLUTION INTRAVENOUS at 13:54

## 2021-07-07 RX ADMIN — PHENYLEPHRINE HYDROCHLORIDE 100 MCG: 10 INJECTION INTRAVENOUS at 14:17

## 2021-07-07 RX ADMIN — FENTANYL CITRATE 50 MCG: 50 INJECTION INTRAMUSCULAR; INTRAVENOUS at 13:23

## 2021-07-07 RX ADMIN — HYDROCODONE BITARTRATE AND ACETAMINOPHEN 1 TABLET: 7.5; 325 TABLET ORAL at 17:40

## 2021-07-07 RX ADMIN — ESCITALOPRAM 20 MG: 20 TABLET, FILM COATED ORAL at 17:39

## 2021-07-07 RX ADMIN — PHENYLEPHRINE HYDROCHLORIDE 100 MCG: 10 INJECTION INTRAVENOUS at 13:10

## 2021-07-07 RX ADMIN — HYDROCODONE BITARTRATE AND ACETAMINOPHEN 2 TABLET: 7.5; 325 TABLET ORAL at 22:36

## 2021-07-07 RX ADMIN — DEXAMETHASONE SODIUM PHOSPHATE 8 MG: 4 INJECTION, SOLUTION INTRAMUSCULAR; INTRAVENOUS at 12:43

## 2021-07-07 RX ADMIN — LISINOPRIL 40 MG: 20 TABLET ORAL at 17:39

## 2021-07-07 RX ADMIN — SODIUM CHLORIDE 750 MG: 900 INJECTION, SOLUTION INTRAVENOUS at 11:21

## 2021-07-07 RX ADMIN — PHENYLEPHRINE HYDROCHLORIDE 100 MCG: 10 INJECTION INTRAVENOUS at 13:59

## 2021-07-07 RX ADMIN — PROPOFOL 50 MG: 10 INJECTION, EMULSION INTRAVENOUS at 12:31

## 2021-07-07 RX ADMIN — FAMOTIDINE 20 MG: 10 INJECTION INTRAVENOUS at 11:51

## 2021-07-07 RX ADMIN — ONDANSETRON 4 MG: 2 INJECTION INTRAMUSCULAR; INTRAVENOUS at 13:49

## 2021-07-07 RX ADMIN — ZOLPIDEM TARTRATE 10 MG: 5 TABLET ORAL at 22:36

## 2021-07-07 RX ADMIN — SODIUM CHLORIDE, POTASSIUM CHLORIDE, SODIUM LACTATE AND CALCIUM CHLORIDE: 600; 310; 30; 20 INJECTION, SOLUTION INTRAVENOUS at 13:57

## 2021-07-07 RX ADMIN — PHENYLEPHRINE HYDROCHLORIDE 100 MCG: 10 INJECTION INTRAVENOUS at 13:29

## 2021-07-07 RX ADMIN — PHENYLEPHRINE HYDROCHLORIDE 100 MCG: 10 INJECTION INTRAVENOUS at 13:23

## 2021-07-07 RX ADMIN — FENTANYL CITRATE 50 MCG: 50 INJECTION INTRAMUSCULAR; INTRAVENOUS at 12:32

## 2021-07-07 RX ADMIN — CEFAZOLIN SODIUM 2 G: 2 INJECTION, SOLUTION INTRAVENOUS at 21:09

## 2021-07-07 RX ADMIN — PHENYLEPHRINE HYDROCHLORIDE 100 MCG: 10 INJECTION INTRAVENOUS at 12:54

## 2021-07-07 RX ADMIN — PROPOFOL 100 MCG/KG/MIN: 10 INJECTION, EMULSION INTRAVENOUS at 12:37

## 2021-07-07 RX ADMIN — PHENYLEPHRINE HYDROCHLORIDE 100 MCG: 10 INJECTION INTRAVENOUS at 13:16

## 2021-07-07 RX ADMIN — CEFAZOLIN SODIUM 2 G: 2 INJECTION, SOLUTION INTRAVENOUS at 12:21

## 2021-07-07 RX ADMIN — LIDOCAINE HYDROCHLORIDE 40 MG: 20 INJECTION, SOLUTION INFILTRATION; PERINEURAL at 12:31

## 2021-07-07 RX ADMIN — PREGABALIN 150 MG: 75 CAPSULE ORAL at 11:21

## 2021-07-07 RX ADMIN — BUPIVACAINE HYDROCHLORIDE 1.6 ML: 7.5 INJECTION, SOLUTION EPIDURAL; RETROBULBAR at 12:32

## 2021-07-07 RX ADMIN — SODIUM CHLORIDE, POTASSIUM CHLORIDE, SODIUM LACTATE AND CALCIUM CHLORIDE 9 ML/HR: 600; 310; 30; 20 INJECTION, SOLUTION INTRAVENOUS at 11:11

## 2021-07-07 RX ADMIN — MIDAZOLAM 0.5 MG: 1 INJECTION INTRAMUSCULAR; INTRAVENOUS at 11:51

## 2021-07-07 NOTE — PLAN OF CARE
Goal Outcome Evaluation:              Outcome Summary: RTK today, arrived to floor 1600, A&O, RA, VSS, ace wrap, chelo, hv, spinal block, not OOB yet due to spinal block, educated on bp monitoring, plans to d/c w/HH, CTM

## 2021-07-07 NOTE — ANESTHESIA PROCEDURE NOTES
Spinal Block    Pre-sedation assessment completed: 7/7/2021 12:32 PM    Patient reassessed immediately prior to procedure    Patient location during procedure: OR  Start Time: 7/7/2021 12:32 PM  Stop Time: 7/7/2021 12:36 PM  Indication:at surgeon's request  Performed By  Anesthesiologist: Robi Gage MD  Preanesthetic Checklist  Completed: patient identified, IV checked, risks and benefits discussed, surgical consent, monitors and equipment checked, pre-op evaluation and timeout performed  Spinal Block Prep:  Patient Position:sitting  Sterile Tech:cap, gloves, gown, mask and sterile barriers  Prep:Betadine  Patient Monitoring:blood pressure monitoring, continuous pulse oximetry and EKG  Spinal Block Procedure  Approach:midline  Guidance:palpation technique  Location:L4-L5  Needle Type:Sprotte  Needle Gauge:24 G  Placement of Spinal needle event:cerebrospinal fluid aspirated  Paresthesia: no  Fluid Appearance:clear  Medications: bupivacaine PF (MARCAINE) 0.75 % injection, 1.6 mL   Post Assessment  Patient Tolerance:patient tolerated the procedure well with no apparent complications  Complications no

## 2021-07-07 NOTE — ANESTHESIA POSTPROCEDURE EVALUATION
Patient: Veronica Babb    Procedure Summary     Date: 07/07/21 Room / Location: [facility] OR [address] [place] [address] MAIN OR    Anesthesia Start: 1224 Anesthesia Stop: 1441    Procedure: TOTAL KNEE ARTHROPLASTY (Right Knee) Diagnosis:       Primary osteoarthritis of right knee      (Primary osteoarthritis of right knee [M17.11])    Surgeons: Jian Hernandez MD Provider: Robi Gage MD    Anesthesia Type: spinal ASA Status: 3          Anesthesia Type: spinal    Vitals  Vitals Value Taken Time   /69 07/07/21 1500   Temp 36.4 °C (97.5 °F) 07/07/21 1440   Pulse 71 07/07/21 1502   Resp 16 07/07/21 1450   SpO2 96 % 07/07/21 1502   Vitals shown include unvalidated device data.        Post Anesthesia Care and Evaluation    Patient location during evaluation: PACU  Patient participation: complete - patient participated  Level of consciousness: awake and alert  Pain management: adequate  Airway patency: patent  Anesthetic complications: No anesthetic complications    Cardiovascular status: acceptable  Respiratory status: acceptable  Hydration status: acceptable    Comments: --------------------            07/07/21               1450     --------------------   BP:                  Pulse:      75       Resp:       16       Temp:                SpO2:      95%      --------------------

## 2021-07-07 NOTE — ANESTHESIA PREPROCEDURE EVALUATION
Anesthesia Evaluation     NPO Solid Status: > 8 hours             Airway   Mallampati: II  Dental      Pulmonary    (+) a smoker Former,   (-) COPD, sleep apnea    ROS comment: Negative patient screen for DAYA    Cardiovascular     (+) hypertension, valvular problems/murmurs murmur,       Neuro/Psych  (+) headaches, psychiatric history Depression,     GI/Hepatic/Renal/Endo      Musculoskeletal     Abdominal    Substance History      OB/GYN          Other   arthritis,                      Anesthesia Plan    ASA 3     spinal       Anesthetic plan, all risks, benefits, and alternatives have been provided, discussed and informed consent has been obtained with: patient.

## 2021-07-08 LAB
HCT VFR BLD AUTO: 27.9 % (ref 34–46.6)
HGB BLD-MCNC: 9.2 G/DL (ref 12–15.9)

## 2021-07-08 PROCEDURE — 63710000001 MELOXICAM 15 MG TABLET: Performed by: NURSE PRACTITIONER

## 2021-07-08 PROCEDURE — A9270 NON-COVERED ITEM OR SERVICE: HCPCS | Performed by: NURSE PRACTITIONER

## 2021-07-08 PROCEDURE — 63710000001 ASPIRIN 81 MG TABLET DELAYED-RELEASE: Performed by: NURSE PRACTITIONER

## 2021-07-08 PROCEDURE — 63710000001 LISINOPRIL 20 MG TABLET: Performed by: NURSE PRACTITIONER

## 2021-07-08 PROCEDURE — 63710000001 MUPIROCIN 2 % OINTMENT: Performed by: NURSE PRACTITIONER

## 2021-07-08 PROCEDURE — 25010000003 CEFAZOLIN IN DEXTROSE 2-4 GM/100ML-% SOLUTION: Performed by: NURSE PRACTITIONER

## 2021-07-08 PROCEDURE — 97116 GAIT TRAINING THERAPY: CPT

## 2021-07-08 PROCEDURE — G0378 HOSPITAL OBSERVATION PER HR: HCPCS

## 2021-07-08 PROCEDURE — 63710000001 HYDROCODONE-ACETAMINOPHEN 7.5-325 MG TABLET: Performed by: NURSE PRACTITIONER

## 2021-07-08 PROCEDURE — 63710000001 POLYETHYLENE GLYCOL 17 G PACK: Performed by: NURSE PRACTITIONER

## 2021-07-08 PROCEDURE — 85018 HEMOGLOBIN: CPT | Performed by: NURSE PRACTITIONER

## 2021-07-08 PROCEDURE — 85014 HEMATOCRIT: CPT | Performed by: NURSE PRACTITIONER

## 2021-07-08 PROCEDURE — 63710000001 AMLODIPINE 10 MG TABLET: Performed by: NURSE PRACTITIONER

## 2021-07-08 PROCEDURE — 63710000001 ESCITALOPRAM 20 MG TABLET: Performed by: NURSE PRACTITIONER

## 2021-07-08 PROCEDURE — 97161 PT EVAL LOW COMPLEX 20 MIN: CPT

## 2021-07-08 RX ORDER — ONDANSETRON 4 MG/1
4 TABLET, FILM COATED ORAL EVERY 8 HOURS PRN
Qty: 10 TABLET | Refills: 0 | Status: SHIPPED | OUTPATIENT
Start: 2021-07-08 | End: 2022-03-15

## 2021-07-08 RX ORDER — PROMETHAZINE HYDROCHLORIDE 12.5 MG/1
12.5 TABLET ORAL EVERY 4 HOURS PRN
Status: CANCELLED | OUTPATIENT
Start: 2021-07-08

## 2021-07-08 RX ORDER — HYDROCODONE BITARTRATE AND ACETAMINOPHEN 7.5; 325 MG/1; MG/1
TABLET ORAL
Qty: 60 TABLET | Refills: 0 | Status: SHIPPED | OUTPATIENT
Start: 2021-07-08 | End: 2021-08-27

## 2021-07-08 RX ORDER — ASPIRIN 81 MG/1
TABLET ORAL
Qty: 60 TABLET | Refills: 0 | Status: SHIPPED | OUTPATIENT
Start: 2021-07-08 | End: 2022-03-15

## 2021-07-08 RX ORDER — PANTOPRAZOLE SODIUM 40 MG/1
40 TABLET, DELAYED RELEASE ORAL DAILY
Qty: 14 TABLET | Refills: 0 | Status: SHIPPED | OUTPATIENT
Start: 2021-07-08 | End: 2021-07-23

## 2021-07-08 RX ORDER — POLYETHYLENE GLYCOL 3350 17 G/17G
17 POWDER, FOR SOLUTION ORAL 2 TIMES DAILY
Qty: 238 G | Refills: 0 | Status: SHIPPED | OUTPATIENT
Start: 2021-07-08 | End: 2021-07-16

## 2021-07-08 RX ADMIN — HYDROCODONE BITARTRATE AND ACETAMINOPHEN 2 TABLET: 7.5; 325 TABLET ORAL at 06:40

## 2021-07-08 RX ADMIN — MUPIROCIN 1 APPLICATION: 20 OINTMENT TOPICAL at 09:05

## 2021-07-08 RX ADMIN — AMLODIPINE BESYLATE 10 MG: 10 TABLET ORAL at 09:06

## 2021-07-08 RX ADMIN — ASPIRIN 81 MG: 81 TABLET, COATED ORAL at 21:43

## 2021-07-08 RX ADMIN — POLYETHYLENE GLYCOL 3350 17 G: 17 POWDER, FOR SOLUTION ORAL at 21:43

## 2021-07-08 RX ADMIN — ESCITALOPRAM 20 MG: 20 TABLET, FILM COATED ORAL at 09:06

## 2021-07-08 RX ADMIN — LISINOPRIL 40 MG: 20 TABLET ORAL at 09:05

## 2021-07-08 RX ADMIN — POLYETHYLENE GLYCOL 3350 17 G: 17 POWDER, FOR SOLUTION ORAL at 09:06

## 2021-07-08 RX ADMIN — MELOXICAM 15 MG: 15 TABLET ORAL at 09:06

## 2021-07-08 RX ADMIN — HYDROCODONE BITARTRATE AND ACETAMINOPHEN 2 TABLET: 7.5; 325 TABLET ORAL at 15:06

## 2021-07-08 RX ADMIN — ASPIRIN 81 MG: 81 TABLET, COATED ORAL at 09:06

## 2021-07-08 RX ADMIN — CEFAZOLIN SODIUM 2 G: 2 INJECTION, SOLUTION INTRAVENOUS at 05:03

## 2021-07-08 RX ADMIN — HYDROCODONE BITARTRATE AND ACETAMINOPHEN 2 TABLET: 7.5; 325 TABLET ORAL at 21:43

## 2021-07-08 NOTE — OP NOTE
Name: Veronica Babb  YOB: 1935    DATE OF SURGERY: 7/7/21    PREOPERATIVE DIAGNOSIS: Right knee end-stage osteoarthritis    POSTOPERATIVE DIAGNOSIS: Right knee end-stage osteoarthritis    PROCEDURE PERFORMED: Right total knee replacement    SURGEON: Jian Hernandez M.D.    ASSISTANT: HAI LEDESMA    IMPLANTS: [employer]hew [employer]:     Implant Name Type Inv. Item Serial No.  Lot No. LRB No. Used Action   CMT BONE PALACOS R HI/VISC 1X40 - [id number] Implant CMT BONE PALACOS R HI/VISC 1X40  [facility] [id number] Right 1 Implanted   SUT CONTRL TISS STRATAFIX SPIRAL MNCRYL UD 3/0 PLS 30CM - [id number] Implant SUT CONTRL TISS STRATAFIX SPIRAL MNCRYL UD 3/0 PLS 30CM  [employer]  DIV OF J AND J [id number] Right 1 Implanted   SUT CONTRL TISS STRATAFIX SYMM PDS PLUS GRACIELA CT-1 60CM - [id number] Implant SUT CONTRL TISS STRATAFIX SYMM PDS PLUS GRACIELA CT-1 60CM  [employer]  DIV OF J AND J [id number] Right 1 Implanted   COMP FEM/KN LEGION OXINIUM PS SZ3 RT - [id number] Implant COMP FEM/KN LEGION OXINIUM PS SZ3 RT  [employer] [id number] Right 1 Implanted   BASE TIB/KN GEN2 NONPOR TI SZ3 RT - [id number] Implant BASE TIB/KN GEN2 NONPOR TI SZ3 RT  [employer]AK[employer] [id number] Right 1 Implanted   PAT GEN2 BICONVEX 00Y04UI - [id number] Implant PAT GEN2 BICONVEX [id number]  [employer]AK[employer] [id number] Right 1 Implanted   INSRT ART LEGION PS HF XLPE SZ3TO4 15MM - [id number] Implant INSRT ART LEGION PS HF XLPE SZ3TO4 15MM  [employer] [id number] Right 1 Implanted       Estimated Blood Loss: 200cc  Specimens : none  Complications: none    DESCRIPTION OF PROCEDURE: The patient was taken to the operating room and placed in the supine position. A sequential compression device was carefully placed on the non-operative leg. Preoperative antibiotics were administered. Surgical time out was performed. After adequate induction of anesthesia, the leg was prepped and draped in the usual sterile fashion,  exsanguinated with an Esmarch bandage and the tourniquet inflated to 250 mmHg. A midline incision was performed followed by a medial parapatellar arthrotomy. The patella was subluxed laterally.  A portion of the fat pad, ACL, and anterior horns of the meniscus were excised. The drill hole was placed in the distal femur and the canal was the irrigated and suctioned. The IM jn was placed and a 5 degree distal valgus cut was performed on the femur. The femur was then sized with a sizing guide. The femoral cutting block was placed and all femoral cuts were performed. The proximal tibia was exposed. We used the extramedullary tibial cutting guide set for removal of 9mm of bone off the high side. The tibial cut was performed. The posterior horns of the menisci were excised. The posterior osteophytes were removed. Flexion extension blocks were then used to balance the knee. The tibial cut surface was then sized with the sizing templates and the tibial and femoral trial were then placed. The knee was placed in full extension and then the tibial tray rotation was then matched to the femoral rotation and marked.    Attention was then placed to the patella. The patella was noted to track centrally through range of motion. The patella was then sized with the trials. The thickness of the patella was then measured. The patella was resurfaced and the surrounding osteophytes were removed. The preoperative thickness was reproduced. The patella tracked centrally through range of motion.   At this point all trial components were removed, the knee was copiously irrigated with pulsed lavage, and the knee was injected with anesthetic cocktail solution. The cut surfaces were then dried with clean lap sponges, and the components were cemented tibia, followed by femur, then patella. The knee was held in full extension and all excess cement was removed. The knee was held still until the cement had completely hardened. We then placed the  trial polyethylene spacer which resulted in full extension and excellent flexion-extension balance. We placed the final polyethylene spacer.   The knee was then copiously irrigated. The tourniquet was then released. There was excellent hemostasis. We placed a one-eighth inch Hemovac drain. We closed the knee in multiple layers in standard fashion. Sterile dressing were applied. At the end of the case, the sponge and needle counts were reported as being correct. There were no known complications. The patient was then transported to the recovery room.      Jian Hernandez M.D.

## 2021-07-08 NOTE — DISCHARGE PLACEMENT REQUEST
"Veronica Babb (86 y.o. Female)     Date of Birth Social Security Number Address Home Phone MRN    1935  [id number]  [place] [address] [phone] [id number]    Latter day Marital Status          Adventism        Admission Date Admission Type Admitting Provider Attending Provider Department, Room/Bed    7/7/21 Elective Jian Hernandez MD Brown, Reid B, MD [address], P896/1    Discharge Date Discharge Disposition Discharge Destination         Home-Health Care [facility]              Attending Provider: Jian Hernandez MD    Allergies: No Known Allergies    Isolation: None   Infection: None   Code Status: Not on file    Ht: 160 cm (63\")   Wt: 58.4 kg (128 lb 12 oz)    Admission Cmt: None   Principal Problem: Primary osteoarthritis of right knee [M17.11] More...                 Active Insurance as of 7/7/2021     Primary Coverage     Payor Plan Insurance Group Employer/Plan Group    MEDICARE MEDICARE A & B      Payor Plan Address Payor Plan Phone Number Payor Plan Fax Number Effective Dates    [address] [phone]  2/1/2002 - None Entered    [facility] [address]       Subscriber Name Subscriber Birth Date Member ID       VERONICA BABB 1935 [id number]           Secondary Coverage     Payor Plan Insurance Group Employer/Plan Group    AARP MC SUP AARP HEALTH CARE OPTIONS      Payor Plan Address Payor Plan Phone Number Payor Plan Fax Number Effective Dates    [facility] [phone]  1/1/2016 - None Entered    [address]       [facility] [address]       Subscriber Name Subscriber Birth Date Member ID       VERONICA BABB 1935 [id number]                 Emergency Contacts      (Rel.) Home Phone Work Phone Mobile Phone    Joesph Jackson (Son) [phone] -- [phone]    Marcia Hu (Daughter) [phone] -- [phone]    CINTHIA BABB (Son) -- -- [phone]              "

## 2021-07-08 NOTE — PLAN OF CARE
See below.    Problem: Adult Inpatient Plan of Care  Goal: Plan of Care Review  7/8/2021 1516 by Suresh Ríos, RN  Outcome: Ongoing, Progressing  Flowsheets (Taken 7/8/2021 1510)  Progress: improving  Plan of Care Reviewed With: patient  Outcome Summary: 86/F POD#1 right TKA.  ALOx4, RA, lungs clear but diminished, BS hypoactive but improving, voiding independently.  Up x1 BRP with walker/gait belt.  2+ pedal pulses noted in BLE, no c/o numbness/tingling in operative extremity, dressing CDI.  Pain well-controlled with PO pain meds only.  PIV saline locked.  D/C planning in progress, will CTM.

## 2021-07-08 NOTE — PLAN OF CARE
This 87 y/o female presents to [facility] POD1 R TKA. Prior to admission, Pt reports living alone with intermittent assistance from her son. Pt has 1 small step to enter home and a flight of stairs inside to an extra bedroom and bathroom that the Pt does not use. Pt owns a cane. Pt reports her daughter will be staying with her through Tuesday, with her son providing occasional assistance after her daughter returns home. Today, Pt appears very lethargic w/ complaints of nausea. Pt was Jasmyne for supine<>sit from a flat bed. Required Patrick for sit<>stand, needing correction of a posterior lean. Pt states that a posterior lean is the reason for most of her falls. Pt required CGA/Patrick using FWW to ambulate 25ft. Pt had several instances of her R knee buckling during ambulation. Also needed Patrick for guidance of FWW. Unable to practice step into home d/t Pt feeling nauseas. R knee ROM currently 10-90. Able to complete TKA exercise protocol x10. This Pt would benefit from staying one more night in the hospital, ambulating with nursing staff, and having one more visit with PT before discharging home w/ family assist and HHPT. Pt will need a FWW and BSC at [place].     Goal Outcome Evaluation:  Plan of Care Reviewed With: (P) patient        Progress: (P) no change

## 2021-07-08 NOTE — DISCHARGE SUMMARY
Patient Name: Veronica Babb  Patient YOB: 1935    Date of Admission:  7/7/2021  Date of Discharge:  7/8/2021  Discharge Diagnosis: VA TOTAL KNEE ARTHROPLASTY [47180] (TOTAL KNEE ARTHROPLASTY)  Presenting Problem/History of Present Illness: Primary osteoarthritis of right knee [M17.11]  OA (osteoarthritis) of knee [M17.10]  Admitting Physician: Dr Jian Hernandez  Consults:   Consults     No orders found for last 30 day(s).          DETAILS OF HOSPITAL STAY:  Patient is a 86 y.o. female was admitted to the floor following the above procedure and underwent an uncomplicated hospital stay.  Patient did well with physical therapy and was ambulating well without problems.  On the day of discharge the wound was clean, dry and intact and calf was soft and non tender and Homans sign was negative.  Patient was tolerating   Diet Instructions     Advance Diet as Tolerated      May advance diet as tolerated while in hospital.    Diet:      Diet Texture / Consistency: Regular       without problems.  Patient will be discharged home.    Condition on Discharge:  Stable    Vital Signs  Temp:  [96.6 °F (35.9 °C)-97.7 °F (36.5 °C)] 97.1 °F (36.2 °C)  Heart Rate:  [62-90] 67  Resp:  [16-18] 16  BP: (119-171)/(62-87) 124/62    LABS:      Admission on 07/07/2021   Component Date Value Ref Range Status   • Hemoglobin 07/08/2021 9.2* 12.0 - 15.9 g/dL Final   • Hematocrit 07/08/2021 27.9* 34.0 - 46.6 % Final   Lab on 07/06/2021   Component Date Value Ref Range Status   • COVID19 07/06/2021 Not Detected  Not Detected - Ref. Range Final       No results found.    Discharge Medications     Discharge Medications      New Medications      Instructions Start Date   aspirin 81 MG EC tablet   Take 1 tab po bid x 14 days; then take 1 tab po daily x 28 days      HYDROcodone-acetaminophen 7.5-325 MG per tablet  Commonly known as: NORCO   1-2 po q 4-6 hr prn pain      ondansetron 4 MG tablet  Commonly known as: Zofran   4 mg, Oral, Every 8  Hours PRN      pantoprazole 40 MG EC tablet  Commonly known as: PROTONIX   40 mg, Oral, Daily      polyethylene glycol 17 GM/SCOOP powder  Commonly known as: MIRALAX   17 g, Oral, 2 Times Daily, Dispense 7 day supply         Changes to Medications      Instructions Start Date   dicyclomine 20 MG tablet  Commonly known as: BENTYL  What changed:   · when to take this  · reasons to take this   TAKE 1 TABLET BY MOUTH EVERY 6 HOURS      promethazine 25 MG tablet  Commonly known as: PHENERGAN  What changed:   · how much to take  · how to take this  · when to take this  · reasons to take this  · additional instructions   TAKE 1/2 TABLET BY MOUTH EVERY 8 HOURS AS NEEDED FOR NAUSEA OR VOMITING      SUMAtriptan 100 MG tablet  Commonly known as: IMITREX  What changed:   · how much to take  · how to take this  · when to take this  · reasons to take this   Take one tablet at onset of headache. May repeat dose one time in 2 hours if headache not relieved.      zolpidem 10 MG tablet  Commonly known as: AMBIEN  What changed: when to take this   10 mg, Oral, Nightly PRN         Continue These Medications      Instructions Start Date   amLODIPine 10 MG tablet  Commonly known as: NORVASC   10 mg, Oral, Daily      diphenhydrAMINE 25 mg capsule  Commonly known as: BENADRYL   25 mg, Oral, Every 6 Hours PRN      escitalopram 20 MG tablet  Commonly known as: LEXAPRO   20 mg, Oral, Daily      lisinopril 40 MG tablet  Commonly known as: PRINIVIL,ZESTRIL   40 mg, Oral, Daily         Stop These Medications    Chlorhexidine Gluconate 2 % pads     mupirocin 2 % ointment  Commonly known as: BACTROBAN            Discharge Instructions: Patient is to continue with physical therapy exercises daily and continue working with the physical therapist as ordered. Patient may weight bear as tolerated. Apply ice regularly. Patient may ice for long periods of time as long as ice is not directly on the skin. Patient instructed on frequent calf pumping  exercises.  Patient also instructed on incentive spirometer during hospitalization and encouraged to continue to use at home regularly.    Dressing: The dressing is designed to be left in place until you return to the office in 2 weeks.  The suction unit should stop functioning at 7 days and the green light will switch to yellow.  At that point the suction unit and tubing can be disconnected at the port closest to the dressing.  The suction unit and tubing may be discarded.  You may shower immediately upon return home, you will need to turn the pump off by depressing the orange button once and then you may disconnect the pump and tubing at the connection port.  After showering, shake off the excess water and reattach the tubing.  Restart the pump by depressing the orange button one time and you will notice the green light flashing again.  If the dressing becomes disloged or saturated it should be changed. Please refer to the MAURILIO information sheet if you have any questions about the dressing.  If you have a home health nurse or therapist they can be contacted to assist with dressing change or repair. You may also call the MAURILIO dressing hotline for questions related to the dressing ([phone]). If there still other problems or questions related to the dressing despite these measures then you can contact Vaishali at our office [phone].  If for some reason the MAURILIO dressing is removed, after 7 days the wound can be gently cleaned with antibacterial soap then allowed to dry and covered with a dry sterile dressing. The wound should be covered at all times except while showering.  Patient may change dressings daily and prn using sterile 4x4 and paper tape, and should call if any unusual drainage, redness or swelling.*  Follow up appointment in 2 weeks - patient to call the office at [phone] to schedule.  Patient will be discharged on aspirin 81mg BID x weeks, then daily x 4weeks    Discharge Diagnosis:    Patient  Active Problem List   Diagnosis   • Primary insomnia   • Lumbar disc disease   • Trapezius muscle spasm   • Knee injury   • Primary osteoarthritis of right knee   • Status post total hip replacement, right   • Essential hypertension   • Gastroesophageal reflux disease with esophagitis   • Migraine without aura   • Overactive bladder   • Cervical disc disease   • Irritable bowel syndrome with constipation   • Hyponatremia   • Reactive depression   • OA (osteoarthritis) of knee       Follow-up Appointments  Future Appointments   Date Time Provider Department Center   7/22/2021 11:10 AM Jian Hernandez MD [facility] [address]     [We will d/c the patient home today if cleared by PT]     ANGELIA Oneil  07/08/21  08:09 EDT

## 2021-07-08 NOTE — CASE MANAGEMENT/SOCIAL WORK
Continued Stay Note  [facility]     Patient Name: Veronica Babb  MRN: [id number]  Today's Date: 7/8/2021    Admit Date: 7/7/2021    Discharge Plan     Row Name 07/08/21 0937       Plan    Plan  Home with family support & [facility] .    Patient/Family in Agreement with Plan  yes    Plan Comments  Spoke with the patient, verified current information and explained the role of the CCP. Patient said she has good family support. She plans to d/c home with family support &  PT. She requests a referral to [facility] . Referral sent in Epic. Spoke with Eva/Ida who will look into the patient's case. Patient also said she plans for her family to transport her home by car at d/c. No other needs identified.        Discharge Codes    No documentation.       Expected Discharge Date and Time     Expected Discharge Date Expected Discharge Time    Jul 8, 2021             Marylou Montoya RN

## 2021-07-08 NOTE — CASE MANAGEMENT/SOCIAL WORK
Case Management Discharge Note      Final Note: Patient's being discharged home with family support & [facility] .         Selected Continued Care - Admitted Since 7/7/2021     Destination    No services have been selected for the patient.              Durable Medical Equipment    No services have been selected for the patient.              Dialysis/Infusion    No services have been selected for the patient.              Home Medical Care Coordination complete    Service Provider Selected Services Address Phone Fax Patient Preferred    [facility]  [facility] Health Services [address] [place] [address] [phone] --          Therapy    No services have been selected for the patient.              Community Resources    No services have been selected for the patient.              Community & DME    No services have been selected for the patient.                       Final Discharge Disposition Code: 01 - home or self-care

## 2021-07-08 NOTE — PLAN OF CARE
Goal Outcome Evaluation:   Ambulated to bathroom several times w/ assist and walker, pain relieved by PRN norco, ice to knee, ABT doses given

## 2021-07-08 NOTE — THERAPY EVALUATION
"Patient Name: Veronica Babb  : 1935    MRN: [id number]                              Today's Date: 2021       Admit Date: 2021    Visit Dx:     ICD-10-CM ICD-9-CM   1. S/P TKR (total knee replacement), right  Z96.651 V43.65   2. Primary osteoarthritis of right knee  M17.11 715.16     Patient Active Problem List   Diagnosis   • Primary insomnia   • Lumbar disc disease   • Trapezius muscle spasm   • Knee injury   • Primary osteoarthritis of right knee   • Status post total hip replacement, right   • Essential hypertension   • Gastroesophageal reflux disease with esophagitis   • Migraine without aura   • Overactive bladder   • Cervical disc disease   • Irritable bowel syndrome with constipation   • Hyponatremia   • Reactive depression   • OA (osteoarthritis) of knee     Past Medical History:   Diagnosis Date   • COVID-19 vaccine series completed     2021   • Hypertension    • IBS (irritable bowel syndrome)    • Insomnia    • Limited joint range of motion (ROM)     RIGHT KNEE   • Migraines    • Murmur     STATES WAS TOLD THIS WAS \"SLIGHT\"   • Right knee pain    • Weakness     RIGHT KNEE     Past Surgical History:   Procedure Laterality Date   • BREAST AUGMENTATION     • CATARACT EXTRACTION, BILATERAL     • COLONOSCOPY  2011   • HIP ARTHROPLASTY Right    • HYSTERECTOMY       General Information     Row Name 2136          Physical Therapy Time and Intention    Document Type  evaluation  (Pended)   -DF     Mode of Treatment  individual therapy;physical therapy  (Pended)   -DF     Row Name 2136          General Information    Prior Level of Function  independent:;min assist:;shopping  (Pended)   -DF     Existing Precautions/Restrictions  fall  (Pended)   -DF     Row Name 2136          Living Environment    Lives With  alone  (Pended)   -DF     Row Name 21 0936          Home Main Entrance    Number of Stairs, Main Entrance  one  (Pended)   -DF     Stair " Railings, Main Entrance  none  (Pended)   -DF     Row Name 07/08/21 0936          Stairs Within Home, Primary    Number of Stairs, Within Home, Primary  none  (Pended)   -DF     Row Name 07/08/21 0936          Cognition    Orientation Status (Cognition)  oriented x 4  (Pended)   -DF     Row Name 07/08/21 0936          Safety Issues, Functional Mobility    Safety Issues Affecting Function (Mobility)  impulsivity  (Pended)   -DF     Impairments Affecting Function (Mobility)  balance;strength;endurance/activity tolerance  (Pended)   -DF       User Key  (r) = Recorded By, (t) = Taken By, (c) = Cosigned By    Initials Name Provider Type    DF Ronnie Vazquez, PT Student PT Student        Mobility     Row Name 07/08/21 0936          Bed Mobility    Bed Mobility  supine-sit  (Pended)   -DF     Supine-Sit [place] (Bed Mobility)  supervision;1 person assist  (Pended)   -DF     Row Name 07/08/21 0936          Bed-Chair Transfer    Bed-Chair [place] (Transfers)  contact guard;minimum assist (75% patient effort);1 person assist  (Pended)   -DF     Assistive Device (Bed-Chair Transfers)  walker, front-wheeled  (Pended)   -DF     Row Name 07/08/21 0936          Sit-Stand Transfer    Sit-Stand [place] (Transfers)  minimum assist (75% patient effort);1 person assist  (Pended)   -DF     Assistive Device (Sit-Stand Transfers)  walker, front-wheeled  (Pended)   -DF     Row Name 07/08/21 0936          Gait/Stairs (Locomotion)    [place] Level (Gait)  contact guard;minimum assist (75% patient effort);1 person assist  (Pended)   -DF     Assistive Device (Gait)  walker, front-wheeled  (Pended)   -DF     Distance in Feet (Gait)  25ft  (Pended)   -DF     Deviations/Abnormal Patterns (Gait)  gait speed decreased;meg decreased;weight shifting decreased;stride length decreased  (Pended)   -DF     Bilateral Gait Deviations  forward flexed posture  (Pended)   -DF     Right Sided Gait Deviations  knee buckling, right side   (Pended)   -DF     [place] Level (Stairs)  not tested  (Pended)   -DF     Row Name 07/08/21 0936          Mobility    Extremity Weight-bearing Status  right lower extremity  (Pended)   -DF     Right Lower Extremity (Weight-bearing Status)  weight-bearing as tolerated (WBAT)  (Pended)   -DF       User Key  (r) = Recorded By, (t) = Taken By, (c) = Cosigned By    Initials Name Provider Type    DF Ronnie Vazquez, PT Student PT Student        Obj/Interventions     Row Name 07/08/21 0937          Range of Motion Comprehensive    Comment, General Range of Motion  R knee ROM 10-90  (Pended)   -DF     [facility] Name 07/08/21 0937          Strength Comprehensive (MMT)    Comment, General Manual Muscle Testing (MMT) Assessment  R knee extension 3/5  (Pended)   -DF     [facility] Name 07/08/21 0937          Motor Skills    Therapeutic Exercise  --  (Pended)  TKA protocol x10  -DF     [facility] Name 07/08/21 0937          Balance    Balance Assessment  sitting static balance;standing static balance  (Pended)   -DF     Static Sitting Balance  WFL  (Pended)   -DF     Static Standing Balance  mild impairment  (Pended)   -DF       User Key  (r) = Recorded By, (t) = Taken By, (c) = Cosigned By    Initials Name Provider Type    DF Ronnie Vazquez, PT Student PT Student        Goals/Plan     Row Name 07/08/21 0939          Bed Mobility Goal 1 (PT)    Activity/Assistive Device (Bed Mobility Goal 1, PT)  bed mobility activities, all  (Pended)   -DF     [place] Level/Cues Needed (Bed Mobility Goal 1, PT)  modified independence  (Pended)   -DF     Time Frame (Bed Mobility Goal 1, PT)  3 days  (Pended)   -DF     Progress/Outcomes (Bed Mobility Goal 1, PT)  continuing progress toward goal  (Pended)   -DF     Row Name 07/08/21 0939          Transfer Goal 1 (PT)    Activity/Assistive Device (Transfer Goal 1, PT)  transfers, all;walker, rolling  (Pended)   -DF     [place] Level/Cues Needed (Transfer Goal 1, PT)  modified independence  (Pended)   -DF      Time Frame (Transfer Goal 1, PT)  3 days  (Pended)   -DF     Progress/Outcome (Transfer Goal 1, PT)  continuing progress toward goal  (Pended)   -DF     Row Name 07/08/21 0939          Gait Training Goal 1 (PT)    Activity/Assistive Device (Gait Training Goal 1, PT)  gait (walking locomotion);walker, rolling  (Pended)   -DF     [place] Level (Gait Training Goal 1, PT)  modified independence  (Pended)   -DF     Distance (Gait Training Goal 1, PT)  50ft  (Pended)   -DF     Time Frame (Gait Training Goal 1, PT)  3 days  (Pended)   -DF     Progress/Outcome (Gait Training Goal 1, PT)  continuing progress toward goal  (Pended)   -DF     Row Name 07/08/21 0939          ROM Goal 1 (PT)    ROM Goal 1 (PT)  R knee ROM 5-95  (Pended)   -DF     Time Frame (ROM Goal 1, PT)  3 days  (Pended)   -DF     Progress/Outcome (ROM Goal 1, PT)  continuing progress toward goal  (Pended)   -DF     Row Name 07/08/21 0939          Stairs Goal 1 (PT)    Activity/Assistive Device (Stairs Goal 1, PT)  stairs, all skills;walker, rolling  (Pended)   -DF     [place] Level/Cues Needed (Stairs Goal 1, PT)  standby assist  (Pended)   -DF     Number of Stairs (Stairs Goal 1, PT)  1  (Pended)   -DF     Time Frame (Stairs Goal 1, PT)  3 days  (Pended)   -DF       User Key  (r) = Recorded By, (t) = Taken By, (c) = Cosigned By    Initials Name Provider Type    DF Ronnie Vazquez, PT Student PT Student        Clinical Impression     Row Name 07/08/21 0938          Pain    Additional Documentation  Pain Scale: Numbers Pre/Post-Treatment (Group)  (Pended)   -DF     Row Name 07/08/21 0938          Pain Scale: Numbers Pre/Post-Treatment    Pretreatment Pain Rating  2/10  (Pended)   -DF     Posttreatment Pain Rating  2/10  (Pended)   -DF     Pain Location - Orientation  generalized  (Pended)   -DF     Pain Location  knee  (Pended)   -DF     Row Name 07/08/21 0938          Plan of Care Review    Plan of Care Reviewed With  patient  (Pended)   -DF      Progress  no change  (Pended)   -DF     Row Name 07/08/21 0938          Therapy Assessment/Plan (PT)    Rehab Potential (PT)  fair, will monitor progress closely  (Pended)   -DF     Criteria for Skilled Interventions Met (PT)  meets criteria  (Pended)   -DF     Row Name 07/08/21 0938          Positioning and Restraints    Pre-Treatment Position  in bed  (Pended)   -DF     Post Treatment Position  chair  (Pended)   -DF     In Chair  reclined;call light within reach;encouraged to call for assist;exit alarm on  (Pended)   -DF       User Key  (r) = Recorded By, (t) = Taken By, (c) = Cosigned By    Initials Name Provider Type    DF Ronnie Vazquez, PT Student PT Student        Outcome Measures     Row Name 07/08/21 0941          How much help from another person do you currently need...    Turning from your back to your side while in flat bed without using bedrails?  4  (Pended)   -DF     Moving from lying on back to sitting on the side of a flat bed without bedrails?  4  (Pended)   -DF     Moving to and from a bed to a chair (including a wheelchair)?  3  (Pended)   -DF     Standing up from a chair using your arms (e.g., wheelchair, bedside chair)?  3  (Pended)   -DF     Climbing 3-5 steps with a railing?  2  (Pended)   -DF     To walk in hospital room?  3  (Pended)   -DF     AM-PAC 6 Clicks Score (PT)  19  (Pended)   -DF     Row Name 07/08/21 0941          Functional Assessment    Outcome Measure Options  AM-PAC 6 Clicks Basic Mobility (PT)  (Pended)   -DF       User Key  (r) = Recorded By, (t) = Taken By, (c) = Cosigned By    Initials Name Provider Type    DF Ronnie Vazquez, PT Student PT Student          PT Recommendation and Plan  Planned Therapy Interventions (PT): (P) balance training, bed mobility training, gait training, home exercise program, joint mobilization, manual therapy techniques, motor coordination training, neuromuscular re-education, patient/family education, ROM (range of motion), stair training,  strengthening, stretching, transfer training, wound care  Plan of Care Reviewed With: (P) patient  Progress: (P) no change     Time Calculation:   PT Charges     Row Name 07/08/21 0941 07/08/21 0856          Time Calculation    Start Time  0806  (Pended)   -DF  --     Stop Time  0837  (Pended)   -DF  --     Time Calculation (min)  31 min  (Pended)   -DF  --     PT Received On  07/08/21  (Pended)   -DF  07/08/21  -AE     PT - Next Appointment  07/09/21  (Pended)   -DF  07/09/21  -AE     PT Goal Re-Cert Due Date  07/22/21  (Pended)   -DF  --        Time Calculation- PT    Total Timed Code Minutes- PT  16 minute(s)  (Pended)   -DF  --       User Key  (r) = Recorded By, (t) = Taken By, (c) = Cosigned By    Initials Name Provider Type    AE Candy Hartmann, PT Physical Therapist    DF Ronnie Vazquez, PT Student PT Student        Therapy Charges for Today     Code Description Service Date Service Provider Modifiers Qty    85161263190 HC PT EVAL LOW COMPLEXITY 2 7/8/2021 Ronnie Vazquez, PT Student GP 1    37794159596 HC GAIT TRAINING EA 15 MIN 7/8/2021 Ronnie Vazquez, PT Student GP 1          PT G-Codes  Outcome Measure Options: (P) AM-PAC 6 Clicks Basic Mobility (PT)  AM-PAC 6 Clicks Score (PT): (P) 19    Ronnie Vazquez PT Student  7/8/2021

## 2021-07-09 ENCOUNTER — READMISSION MANAGEMENT (OUTPATIENT)
Dept: CALL CENTER | Facility: HOSPITAL | Age: 86
End: 2021-07-09

## 2021-07-09 VITALS
WEIGHT: 128.75 LBS | BODY MASS INDEX: 22.81 KG/M2 | DIASTOLIC BLOOD PRESSURE: 55 MMHG | RESPIRATION RATE: 18 BRPM | SYSTOLIC BLOOD PRESSURE: 100 MMHG | OXYGEN SATURATION: 93 % | HEART RATE: 73 BPM | TEMPERATURE: 97.1 F | HEIGHT: 63 IN

## 2021-07-09 LAB
HCT VFR BLD AUTO: 24.2 % (ref 34–46.6)
HGB BLD-MCNC: 8 G/DL (ref 12–15.9)

## 2021-07-09 PROCEDURE — A9270 NON-COVERED ITEM OR SERVICE: HCPCS | Performed by: NURSE PRACTITIONER

## 2021-07-09 PROCEDURE — 85014 HEMATOCRIT: CPT | Performed by: NURSE PRACTITIONER

## 2021-07-09 PROCEDURE — 97116 GAIT TRAINING THERAPY: CPT

## 2021-07-09 PROCEDURE — 97530 THERAPEUTIC ACTIVITIES: CPT

## 2021-07-09 PROCEDURE — 85018 HEMOGLOBIN: CPT | Performed by: NURSE PRACTITIONER

## 2021-07-09 PROCEDURE — G0378 HOSPITAL OBSERVATION PER HR: HCPCS

## 2021-07-09 PROCEDURE — 25010000002 ONDANSETRON PER 1 MG: Performed by: NURSE PRACTITIONER

## 2021-07-09 PROCEDURE — 63710000001 MELOXICAM 15 MG TABLET: Performed by: NURSE PRACTITIONER

## 2021-07-09 PROCEDURE — 63710000001 HYDROCODONE-ACETAMINOPHEN 7.5-325 MG TABLET: Performed by: NURSE PRACTITIONER

## 2021-07-09 PROCEDURE — 97110 THERAPEUTIC EXERCISES: CPT

## 2021-07-09 PROCEDURE — 63710000001 ONDANSETRON PER 8 MG: Performed by: NURSE PRACTITIONER

## 2021-07-09 PROCEDURE — 63710000001 ASPIRIN 81 MG TABLET DELAYED-RELEASE: Performed by: NURSE PRACTITIONER

## 2021-07-09 PROCEDURE — 63710000001 ESCITALOPRAM 20 MG TABLET: Performed by: NURSE PRACTITIONER

## 2021-07-09 PROCEDURE — 63710000001 POLYETHYLENE GLYCOL 17 G PACK: Performed by: NURSE PRACTITIONER

## 2021-07-09 RX ADMIN — ESCITALOPRAM 20 MG: 20 TABLET, FILM COATED ORAL at 09:04

## 2021-07-09 RX ADMIN — HYDROCODONE BITARTRATE AND ACETAMINOPHEN 1 TABLET: 7.5; 325 TABLET ORAL at 09:05

## 2021-07-09 RX ADMIN — ONDANSETRON HYDROCHLORIDE 4 MG: 4 TABLET, FILM COATED ORAL at 00:14

## 2021-07-09 RX ADMIN — ONDANSETRON 4 MG: 2 INJECTION INTRAMUSCULAR; INTRAVENOUS at 09:05

## 2021-07-09 RX ADMIN — POLYETHYLENE GLYCOL 3350 17 G: 17 POWDER, FOR SOLUTION ORAL at 09:04

## 2021-07-09 RX ADMIN — ASPIRIN 81 MG: 81 TABLET, COATED ORAL at 09:04

## 2021-07-09 RX ADMIN — MELOXICAM 15 MG: 15 TABLET ORAL at 09:04

## 2021-07-09 NOTE — THERAPY TREATMENT NOTE
"Patient Name: Veronica Babb  : 1935    MRN: [id number]                              Today's Date: 2021       Admit Date: 2021    Visit Dx:     ICD-10-CM ICD-9-CM   1. S/P TKR (total knee replacement), right  Z96.651 V43.65   2. Primary osteoarthritis of right knee  M17.11 715.16     Patient Active Problem List   Diagnosis   • Primary insomnia   • Lumbar disc disease   • Trapezius muscle spasm   • Knee injury   • Primary osteoarthritis of right knee   • Status post total hip replacement, right   • Essential hypertension   • Gastroesophageal reflux disease with esophagitis   • Migraine without aura   • Overactive bladder   • Cervical disc disease   • Irritable bowel syndrome with constipation   • Hyponatremia   • Reactive depression   • OA (osteoarthritis) of knee     Past Medical History:   Diagnosis Date   • COVID-19 vaccine series completed     2021   • Hypertension    • IBS (irritable bowel syndrome)    • Insomnia    • Limited joint range of motion (ROM)     RIGHT KNEE   • Migraines    • Murmur     STATES WAS TOLD THIS WAS \"SLIGHT\"   • Right knee pain    • Weakness     RIGHT KNEE     Past Surgical History:   Procedure Laterality Date   • BREAST AUGMENTATION     • CATARACT EXTRACTION, BILATERAL     • COLONOSCOPY  2011   • HIP ARTHROPLASTY Right    • HYSTERECTOMY     • TOTAL KNEE ARTHROPLASTY Right 2021    Procedure: TOTAL KNEE ARTHROPLASTY;  Surgeon: Jian Hernandez MD;  Location: [facility];  Service: Orthopedics;  Laterality: Right;     General Information     Row Name 21 1122          Physical Therapy Time and Intention    Document Type  therapy note (daily note)  (Pended)   -DF     Mode of Treatment  individual therapy;physical therapy  (Pended)   -DF     Row Name 21 1122          General Information    Existing Precautions/Restrictions  fall  (Pended)   -DF     Row Name 21 1122          Cognition    Orientation Status (Cognition)  oriented x 4  (Pended) "   -DF     Row Name 07/09/21 1122          Safety Issues, Functional Mobility    Impairments Affecting Function (Mobility)  endurance/activity tolerance;strength  (Pended)   -DF       User Key  (r) = Recorded By, (t) = Taken By, (c) = Cosigned By    Initials Name Provider Type    DF Ronnie Vazquez, PT Student PT Student        Mobility     Row Name 07/09/21 1123          Bed Mobility    Bed Mobility  supine-sit  (Pended)   -DF     Supine-Sit [place] (Bed Mobility)  standby assist;1 person assist  (Pended)   -DF     Row Name 07/09/21 1123          Bed-Chair Transfer    Bed-Chair [place] (Transfers)  standby assist;contact guard;1 person assist  (Pended)   -DF     Row Name 07/09/21 1123          Sit-Stand Transfer    Sit-Stand [place] (Transfers)  standby assist;1 person assist  (Pended)   -DF     Assistive Device (Sit-Stand Transfers)  walker, front-wheeled  (Pended)   -DF     [facility] Name 07/09/21 1123          Gait/Stairs (Locomotion)    [place] Level (Gait)  standby assist;contact guard;1 person assist  (Pended)   -DF     Assistive Device (Gait)  walker, front-wheeled  (Pended)   -DF     Distance in Feet (Gait)  30ft  (Pended)   -DF     Deviations/Abnormal Patterns (Gait)  meg decreased;gait speed decreased;stride length decreased;weight shifting decreased  (Pended)   -DF     Bilateral Gait Deviations  heel strike decreased  (Pended)   -DF     Number of Steps (Stairs)  completed 1 step x5 using FWW and approaching the stairs backward  (Pended)   -DF     Ascending Technique (Stairs)  step-to-step  (Pended)   -DF     Descending Technique (Stairs)  step-to-step  (Pended)   -DF     Row Name 07/09/21 1123          Mobility    Extremity Weight-bearing Status  right upper extremity  (Pended)   -DF     Right Lower Extremity (Weight-bearing Status)  weight-bearing as tolerated (WBAT)  (Pended)   -DF       User Key  (r) = Recorded By, (t) = Taken By, (c) = Cosigned By    Initials Name Provider Type    DF  Ronnie Vazquez, PT Student PT Student        Obj/Interventions     Row Name 07/09/21 1124          Range of Motion Comprehensive    Comment, General Range of Motion  R knee ROM 10-90  (Pended)   -DF     Row Name 07/09/21 1124          Strength Comprehensive (MMT)    Comment, General Manual Muscle Testing (MMT) Assessment  knee extension 3/5  (Pended)   -DF     Row Name 07/09/21 1124          Motor Skills    Therapeutic Exercise  --  (Pended)  TKA protocol x15  -DF     Row Name 07/09/21 1124          Balance    Balance Assessment  sitting static balance;standing static balance  (Pended)   -DF     Static Sitting Balance  WFL  (Pended)   -DF     Static Standing Balance  WFL  (Pended)   -DF       User Key  (r) = Recorded By, (t) = Taken By, (c) = Cosigned By    Initials Name Provider Type    Ronnie Stewart, PT Student PT Student        Goals/Plan    No documentation.       Clinical Impression     Row Name 07/09/21 1125          Pain    Additional Documentation  Pain Scale: Numbers Pre/Post-Treatment (Group)  (Pended)   -DF     Row Name 07/09/21 1125          Pain Scale: Numbers Pre/Post-Treatment    Pretreatment Pain Rating  5/10  (Pended)   -DF     Posttreatment Pain Rating  5/10  (Pended)   -DF     Pain Location - Orientation  generalized  (Pended)   -DF     Pain Location  knee  (Pended)   -DF     Row Name 07/09/21 1125          Plan of Care Review    Plan of Care Reviewed With  patient;daughter  (Pended)   -DF     Progress  improving  (Pended)   -DF     Row Name 07/09/21 1125          Therapy Assessment/Plan (PT)    Criteria for Skilled Interventions Met (PT)  meets criteria  (Pended)   -DF     Row Name 07/09/21 1125          Positioning and Restraints    Pre-Treatment Position  in bed  (Pended)   -DF     Post Treatment Position  bathroom  (Pended)   -DF     Bathroom  sitting;with nsg  (Pended)   -DF       User Key  (r) = Recorded By, (t) = Taken By, (c) = Cosigned By    Initials Name Provider Type    ALTON Vazquez  Ronnie, PT Student PT Student        Outcome Measures     Row Name 07/09/21 1125          How much help from another person do you currently need...    Turning from your back to your side while in flat bed without using bedrails?  4  (Pended)   -DF     Moving from lying on back to sitting on the side of a flat bed without bedrails?  4  (Pended)   -DF     Moving to and from a bed to a chair (including a wheelchair)?  3  (Pended)   -DF     Standing up from a chair using your arms (e.g., wheelchair, bedside chair)?  4  (Pended)   -DF     Climbing 3-5 steps with a railing?  2  (Pended)   -DF     To walk in hospital room?  3  (Pended)   -DF     AM-PAC 6 Clicks Score (PT)  20  (Pended)   -DF     Row Name 07/09/21 1125          Functional Assessment    Outcome Measure Options  AM-PAC 6 Clicks Basic Mobility (PT)  (Pended)   -DF       User Key  (r) = Recorded By, (t) = Taken By, (c) = Cosigned By    Initials Name Provider Type    DF Ronnie Vazquez, PT Student PT Student          PT Recommendation and Plan  Planned Therapy Interventions (PT): balance training, bed mobility training, gait training, home exercise program, joint mobilization, manual therapy techniques, motor coordination training, neuromuscular re-education, patient/family education, ROM (range of motion), stair training, strengthening, stretching, transfer training, wound care  Plan of Care Reviewed With: (P) patient, daughter  Progress: (P) improving     Time Calculation:   PT Charges     Row Name 07/09/21 1126             Time Calculation    Start Time  0948  (Pended)   -DF      Stop Time  1028  (Pended)   -DF      Time Calculation (min)  40 min  (Pended)   -DF      PT Received On  07/09/21  (Pended)   -DF      PT - Next Appointment  07/10/21  (Pended)   -DF         Time Calculation- PT    Total Timed Code Minutes- PT  40 minute(s)  (Pended)   -DF        User Key  (r) = Recorded By, (t) = Taken By, (c) = Cosigned By    Initials Name Provider Type    DF  Ronnie Vazquez, PT Student PT Student        Therapy Charges for Today     Code Description Service Date Service Provider Modifiers Qty    83977371280 HC PT EVAL LOW COMPLEXITY 2 7/8/2021 Ronnie Vazquez, PT Student GP 1    05734745622 HC GAIT TRAINING EA 15 MIN 7/8/2021 Ronnie Vazquez, PT Student GP 1    15617227827 HC GAIT TRAINING EA 15 MIN 7/9/2021 Ronnie Vazquez, PT Student GP 1    68207447945 HC PT THER PROC EA 15 MIN 7/9/2021 Ronnie Vazquez, PT Student GP 1    14635694022 HC PT THERAPEUTIC ACT EA 15 MIN 7/9/2021 Ronnie Vazquez, PT Student GP 1          PT G-Codes  Outcome Measure Options: (P) AM-PAC 6 Clicks Basic Mobility (PT)  AM-PAC 6 Clicks Score (PT): (P) 20    Ronnie Vazquez, PT Student  7/9/2021

## 2021-07-09 NOTE — PLAN OF CARE
Pt is currently POD2 R TKA. Pt appears much less lethargic today compared to her P.T evaluation completed yesterday. Pt able to complete TKA exercise protocol x15. Pt was SBA for supine to sit from flat bed, no bed rails. Pt was SBA for sit<>stand. No posterior lean observed today. Pt required SBA/CGA to ambulate 30ft. No buckling of the R knee noted. Pt's nausea did increase with ambulation. Pt initially able to complete one step holding on both hand rails. However, she does not have a hand rail at home. Pt able to complete one step without the hand rail by approaching the stairs backwards w/ FWW and stepping up with her L LE. Able to complete one step using this method x5. Pt and daughter were educated on the Pt's safest way to navigate her step at home. Pt also explained that another entrance into her home has a much smaller step that should be easier to navigate than the one we practiced on. Current R knee ROM 10-90. DC recs home with assist of daughter who is staying indefinitely and HHPT. Pt still waiting on FWW and BSC. Pt left in bathroom on shower chair with nsg assistant.     Goal Outcome Evaluation:  Plan of Care Reviewed With: (P) patient, daughter        Progress: (P) improving

## 2021-07-09 NOTE — OUTREACH NOTE
Prep Survey      Responses   [facility] facility patient discharged from?  [place]   Is LACE score < 7 ?  Yes   Emergency Room discharge w/ pulse ox?  No   Eligibility  [facility]   Date of Admission  07/07/21   Date of Discharge  07/09/21   [place] [facility]charge diagnosis  TOTAL KNEE ARTHROPLASTY   Does the patient have one of the following disease processes/diagnoses(primary or secondary)?  Total Joint Replacement   Does the patient have Home health ordered?  Yes   What is the Home health agency?    [facility] .   Is there a DME ordered?  No   Prep survey completed?  Yes          Isabel Miles RN

## 2021-07-09 NOTE — PLAN OF CARE
Goal Outcome Evaluation:PT POD2 Rigth total knee with MAURILIO, VSS, afebrile, worked well with therapy, voiding well, pain controlled with po pain medication, DC instructions reviewed with pt and DTR, all questions answered,, IV DC'd, Pt taken with belongings via WC to DC exit.

## 2021-07-12 ENCOUNTER — TRANSITIONAL CARE MANAGEMENT TELEPHONE ENCOUNTER (OUTPATIENT)
Dept: CALL CENTER | Facility: HOSPITAL | Age: 86
End: 2021-07-12

## 2021-07-12 NOTE — OUTREACH NOTE
Call Center TCM Note      Responses   [facility] patient discharged from?  [place]   Does the patient have one of the following disease processes/diagnoses(primary or secondary)?  Total Joint Replacement   Joint surgery performed?  Knee   TCM attempt successful?  No   Unsuccessful attempts  Attempt 2          Dorota Lopez LPN    7/12/2021, 17:21 EDT

## 2021-07-12 NOTE — OUTREACH NOTE
Call Center TCM Note      Responses   [facility] patient discharged from?  [place]   Does the patient have one of the following disease processes/diagnoses(primary or secondary)?  Total Joint Replacement   Joint surgery performed?  Knee   TCM attempt successful?  No   Unsuccessful attempts  Attempt 1          Dorota Lopez LPN    7/12/2021, 09:35 EDT

## 2021-07-13 ENCOUNTER — TRANSITIONAL CARE MANAGEMENT TELEPHONE ENCOUNTER (OUTPATIENT)
Dept: CALL CENTER | Facility: HOSPITAL | Age: 86
End: 2021-07-13

## 2021-07-13 ENCOUNTER — TELEPHONE (OUTPATIENT)
Dept: ORTHOPEDIC SURGERY | Facility: CLINIC | Age: 86
End: 2021-07-13

## 2021-07-13 NOTE — TELEPHONE ENCOUNTER
Hub staff attempted to follow warm transfer process and was unsuccessful       Caller: EMILY PETER    Relationship to patient: SELF    Best call back number: [phone]    Patient is needing:  PATIENT HAD SURGERY 7-7-21.  PATIENT NEEDS A CALL BACK TO DISCUSS RT KNEE SWELLING / WARM TO TOUCH / BLACK & BLUE

## 2021-07-13 NOTE — OUTREACH NOTE
Call Center TCM Note      Responses   [facility] patient discharged from?  [place]   Does the patient have one of the following disease processes/diagnoses(primary or secondary)?  Total Joint Replacement   TCM attempt successful?  Yes [Philip son on verbal release ]   Call start time  1057   Call end time  1106   Discharge diagnosis  TOTAL KNEE ARTHROPLASTY   Does the patient have all medications related to this admission filled (includes all antibiotics, pain medications, etc.)  Yes   Is the patient taking all medications as directed (includes completed medication regime)?  Yes   Is the patient able to teach back alternate methods of pain control?  Ice   Comments regarding appointments  Surgeon fu apt on 7-22-21   Does the patient have a follow up appointment with their surgeon?  Yes   Has the patient kept scheduled appointments due by today?  N/A   Comments  Hosp dc fu apt on 7-16-21 with PCP    What is the Home health agency?    [facility] .   Has home health visited the patient within 72 hours of discharge?  Yes   Home health comments  States a therapist is coming out later this afternoon   Psychosocial issues?  No   Did the patient receive a copy of their discharge instructions?  Yes   Nursing interventions  Reviewed instructions with patient   What is the patient's perception of their functional status since discharge?  Same [Patient states she is concerned about the increased swelling recently and the black/blueness surrounding the area.  Instructed patient to call surgeon's office to report the appearance and to also show the therapist today that will be seeing pt ]   Is the patient able to teach back signs and symptoms of infection?  Increased swelling or redness around incision (not associated with surgical edema)   Is the patient able to teach back how to prevent infection?  Check incision daily, Wash hands before and after touching incision   Is the patient able to teach back signs and  symptoms of DVT?  Swelling in calf, Area hot to touch, Redness in calf   Is the patient/caregiver able to teach back the hierarchy of who to call/visit for symptoms/problems? PCP, Specialist, Home health nurse, Urgent Care, ED, 911  Yes   TCM call completed?  Yes          Juliana Manuel RN    7/13/2021, 11:09 EDT

## 2021-07-13 NOTE — TELEPHONE ENCOUNTER
I called and spoke with the patient regards to her questions or concerns.  Patient seems to be having typical postop pain, swelling.  Patient was evaluated by physical therapist at home today and said that he thought it was normal.  Patient denies any mid calf tenderness or calf pain or any signs of a DVT noted.  I did educate her on the signs and symptoms of a DVT and should her situation change she is instructed to give us call back.

## 2021-07-19 ENCOUNTER — TELEPHONE (OUTPATIENT)
Dept: ORTHOPEDIC SURGERY | Facility: CLINIC | Age: 86
End: 2021-07-19

## 2021-07-19 NOTE — TELEPHONE ENCOUNTER
Caller: VIPUL MOCK    Relationship: SON    Best call back number: [phone]    What is the best time to reach you: ANY    Who are you requesting to speak with (clinical staff, provider,  specific staff member): ANY    What was the call regarding: PATIENT WANTS TO DISCUSS TREATMENT PLAN. SAYS MOTHER IS IN PAIN AND DOESN'T KNOW IF SHE SHOULD DO PHYSICAL THERAPY TODAY.     Do you require a callback: YES

## 2021-07-22 ENCOUNTER — OFFICE VISIT (OUTPATIENT)
Dept: ORTHOPEDIC SURGERY | Facility: CLINIC | Age: 86
End: 2021-07-22

## 2021-07-22 VITALS — TEMPERATURE: 98.6 F | WEIGHT: 125 LBS | HEIGHT: 63 IN | BODY MASS INDEX: 22.15 KG/M2

## 2021-07-22 DIAGNOSIS — Z96.651 STATUS POST RIGHT KNEE REPLACEMENT: Primary | ICD-10-CM

## 2021-07-22 PROCEDURE — 99024 POSTOP FOLLOW-UP VISIT: CPT | Performed by: ORTHOPAEDIC SURGERY

## 2021-07-22 NOTE — PROGRESS NOTES
Veronica Babb : 1935 MRN: [id number] DATE: 2021    DIAGNOSIS: 2 week follow up right total knee      SUBJECTIVE:Patient returns today for 2 week follow up of right total knee replacement. Patient reports doing well with no unusual complaints. Appears to be progressing appropriately.    OBJECTIVE:   Exam:. The incision is healing appropriately. No sign of infection. Range of motion is progressing as expected. The calf is soft and nontender with a negative Homans sign.    ASSESSMENT: 2 week status post right knee replacement.    PLAN: 1) Staples removed and steri strips applied   2) Order given for PT   3) Discontinue NAIF hose   4) Continue ice PRN   5) aspirin 81 mg orally every day for 1 month   6) Follow up in 6 weeks with repeat Xrays of right knee (3views)    Jian Hernandez MD  2021

## 2021-07-30 RX ORDER — PROMETHAZINE HYDROCHLORIDE 25 MG/1
TABLET ORAL
Qty: 20 TABLET | Refills: 0 | OUTPATIENT
Start: 2021-07-30

## 2021-08-03 DIAGNOSIS — R11.0 NAUSEA: ICD-10-CM

## 2021-08-03 DIAGNOSIS — G43.019 INTRACTABLE MIGRAINE WITHOUT AURA AND WITHOUT STATUS MIGRAINOSUS: Primary | ICD-10-CM

## 2021-08-03 RX ORDER — SUMATRIPTAN 100 MG/1
TABLET, FILM COATED ORAL
Qty: 12 TABLET | Refills: 1 | Status: SHIPPED | OUTPATIENT
Start: 2021-08-03 | End: 2022-09-01

## 2021-08-03 RX ORDER — PROMETHAZINE HYDROCHLORIDE 25 MG/1
TABLET ORAL
Qty: 20 TABLET | Refills: 0 | Status: SHIPPED | OUTPATIENT
Start: 2021-08-03 | End: 2022-03-23

## 2021-08-13 DIAGNOSIS — F51.01 PRIMARY INSOMNIA: ICD-10-CM

## 2021-08-13 RX ORDER — ZOLPIDEM TARTRATE 10 MG/1
10 TABLET ORAL NIGHTLY PRN
Qty: 30 TABLET | OUTPATIENT
Start: 2021-08-13

## 2021-08-16 DIAGNOSIS — K29.00 ACUTE GASTRITIS WITHOUT HEMORRHAGE, UNSPECIFIED GASTRITIS TYPE: ICD-10-CM

## 2021-08-16 RX ORDER — PANTOPRAZOLE SODIUM 40 MG/1
40 TABLET, DELAYED RELEASE ORAL DAILY
Qty: 90 TABLET | Refills: 0 | Status: SHIPPED | OUTPATIENT
Start: 2021-08-16 | End: 2022-03-15

## 2021-08-27 ENCOUNTER — TELEMEDICINE (OUTPATIENT)
Dept: INTERNAL MEDICINE | Facility: CLINIC | Age: 86
End: 2021-08-27

## 2021-08-27 VITALS
RESPIRATION RATE: 16 BRPM | OXYGEN SATURATION: 99 % | TEMPERATURE: 97.3 F | BODY MASS INDEX: 22.15 KG/M2 | SYSTOLIC BLOOD PRESSURE: 110 MMHG | HEIGHT: 63 IN | HEART RATE: 81 BPM | WEIGHT: 125 LBS | DIASTOLIC BLOOD PRESSURE: 82 MMHG

## 2021-08-27 DIAGNOSIS — F32.9 REACTIVE DEPRESSION: Chronic | ICD-10-CM

## 2021-08-27 DIAGNOSIS — F51.01 PRIMARY INSOMNIA: ICD-10-CM

## 2021-08-27 DIAGNOSIS — K58.1 IRRITABLE BOWEL SYNDROME WITH CONSTIPATION: Primary | ICD-10-CM

## 2021-08-27 DIAGNOSIS — M17.11 PRIMARY OSTEOARTHRITIS OF RIGHT KNEE: Chronic | ICD-10-CM

## 2021-08-27 DIAGNOSIS — I10 ESSENTIAL HYPERTENSION: Chronic | ICD-10-CM

## 2021-08-27 PROCEDURE — 99214 OFFICE O/P EST MOD 30 MIN: CPT | Performed by: NURSE PRACTITIONER

## 2021-08-27 RX ORDER — ZOLPIDEM TARTRATE 10 MG/1
10 TABLET ORAL NIGHTLY PRN
Qty: 30 TABLET | Refills: 5 | Status: SHIPPED | OUTPATIENT
Start: 2021-08-27 | End: 2022-02-16

## 2021-08-27 NOTE — PROGRESS NOTES
Chief Complaint  Abdominal Pain (4 -5 weeks ), Hypertension, and Arthritis     Subjective:      History of Present Illness {CC  Problem List  Visit  Diagnosis   Encounters  Notes  Medications  Labs  Result Review Imaging  Media :23}     Veronica Babb presents to [facility] for chronic medical conditions including: Hypertension, migraines, insomnia, depression/anxiety, GERD, OAB    Since last visit: she had right knee arthroplasty with Dr Hernandez.  States it is doing much better.  She is working with PT.    After her surgery, she had GI issues.  Constipation.  Likely related to decreased mobility/pain medication.  She is now taking metamucil and says she is having daily BM and getting back to normal.     Hypertension controlled on norvasc and lisinopril.  No SOA or CP.     Depression/Anxiety - stable on lexapro.  States her family asked if she needed more but she said she has been more anxious because she had to rely on someone else and getting better now that she will be driving.      Needs handicap sticker - I completed today.        Objective:      Physical Exam  Vitals reviewed.   Constitutional:       Appearance: Normal appearance. She is well-developed.   HENT:      Head:      Comments: Wearing mask due to COVID   Neck:      Thyroid: No thyromegaly.   Cardiovascular:      Rate and Rhythm: Normal rate and regular rhythm.      Pulses: Normal pulses.      Heart sounds: Normal heart sounds.   Pulmonary:      Effort: Pulmonary effort is normal.      Breath sounds: Normal breath sounds.      Comments: E/U   Musculoskeletal:         General: Normal range of motion.      Cervical back: Normal range of motion and neck supple.      Right lower leg: Edema (mild) present.   Lymphadenopathy:      Cervical: No cervical adenopathy.   Skin:     General: Skin is warm and dry.      Capillary Refill: Capillary refill takes 2 to 3 seconds.      Comments: R knee - surgical site WDI   "  Neurological:      Mental Status: She is alert and oriented to person, place, and time.   Psychiatric:         Mood and Affect: Mood normal.         Behavior: Behavior normal. Behavior is cooperative.         Thought Content: Thought content normal.         Judgment: Judgment normal.        Result Review  Data Reviewed:{ Labs  Result Review  Imaging  Med Tab  Media :23}   The following data was reviewed by: Josef Kemp III, NP-WISAM on 08/27/2021  Lab Results - Last 18 Months   Lab Units 07/09/21  0515 07/08/21  0514 06/24/21  1004 01/29/21  1505 06/09/20  1501 06/09/20  1501 03/04/20  1400 03/04/20  1400   GLUCOSE mg/dL  --   --   --  93  --   --   --  86   BUN mg/dL  --   --  13 12  --   --   --  13   CREATININE mg/dL  --   --  0.78 0.68  --   --   --  0.76   EGFR IF NONAFRICN AM mL/min/1.73  --   --  70 83  --   --   --  73   EGFR IF AFRICN AM mL/min/1.73  --   --   --  100  --   --   --  88   SODIUM mmol/L  --   --  133* 135*  --   --   --  128*   POTASSIUM mmol/L  --   --  4.6 4.4  --   --   --  3.9   CHLORIDE mmol/L  --   --  99 98  --   --   --  88*   CALCIUM mg/dL  --   --  9.7 9.6  --   --   --  9.5   ALBUMIN g/dL  --   --   --  4.40  --   --   --   --    BILIRUBIN mg/dL  --   --   --  0.2  --   --   --   --    ALK PHOS U/L  --   --   --  82  --   --   --   --    AST (SGOT) U/L  --   --   --  19  --   --   --   --    ALT (SGPT) U/L  --   --   --  10  --   --   --   --    WBC 10*3/mm3  --   --  5.68 4.78  --  6.67   < > 5.57   RBC 10*6/mm3  --   --  4.03 4.06  --  3.80   < > 3.79   HEMATOCRIT % 24.2* 27.9* 37.6 37.0   < > 34.6   < > 34.7   MCV fL  --   --  93.3 91.1  --  91.1   < > 91.6   MCH pg  --   --  30.5 29.3  --  31.3   < > 30.3    < > = values in this interval not displayed.          Vital Signs:   /82 (BP Location: Left arm, Patient Position: Sitting, Cuff Size: Adult)   Pulse 81   Temp 97.3 °F (36.3 °C) (Temporal)   Resp 16   Ht 160 cm (63\")   Wt 56.7 kg (125 lb)   " SpO2 99%   BMI 22.14 kg/m²         Requested Prescriptions     Signed Prescriptions Disp Refills   • zolpidem (AMBIEN) 10 MG tablet 30 tablet 5     Sig: Take 1 tablet by mouth At Night As Needed for Sleep.     Routine medications provided by this office will also be refilled via pharmacy request.       Current Outpatient Medications:   •  amLODIPine (NORVASC) 10 MG tablet, TAKE 1 TABLET BY MOUTH DAILY, Disp: 90 tablet, Rfl: 1  •  aspirin 81 MG EC tablet, Take 1 tablet by mouth twice daily x 14 days; then take 1 tablet daily x 28 days, Disp: 60 tablet, Rfl: 0  •  dicyclomine (BENTYL) 20 MG tablet, TAKE 1 TABLET BY MOUTH EVERY 6 HOURS (Patient taking differently: Take 20 mg by mouth Every 6 (Six) Hours As Needed.), Disp: 60 tablet, Rfl: 5  •  diphenhydrAMINE (BENADRYL) 25 mg capsule, Take 25 mg by mouth Every 6 (Six) Hours As Needed for Itching., Disp: , Rfl:   •  escitalopram (LEXAPRO) 20 MG tablet, TAKE 1 TABLET BY MOUTH DAILY, Disp: 90 tablet, Rfl: 0  •  lisinopril (PRINIVIL,ZESTRIL) 40 MG tablet, TAKE 1 TABLET BY MOUTH DAILY, Disp: 90 tablet, Rfl: 1  •  ondansetron (Zofran) 4 MG tablet, Take 1 tablet by mouth Every 8 (Eight) Hours As Needed for Nausea or Vomiting, Disp: 10 tablet, Rfl: 0  •  pantoprazole (PROTONIX) 40 MG EC tablet, TAKE 1 TABLET BY MOUTH DAILY, Disp: 90 tablet, Rfl: 0  •  promethazine (PHENERGAN) 25 MG tablet, TAKE 1/2 TABLET BY MOUTH EVERY 8 HOURS AS NEEDED FOR NAUSEA OR VOMITING, Disp: 20 tablet, Rfl: 0  •  SUMAtriptan (IMITREX) 100 MG tablet, TAKE 1 TABLET BY MOUTH AT ONSET OF HEADACHE. MAY REPEAT DOSE 1 TIME IN 2 HOURS IF HEADACHE NOT RELIEVED, Disp: 12 tablet, Rfl: 1  •  zolpidem (AMBIEN) 10 MG tablet, Take 1 tablet by mouth At Night As Needed for Sleep., Disp: 30 tablet, Rfl: 5  No current facility-administered medications for this visit.     Assessment and Plan:      Assessment and Plan {CC Problem List  Visit Diagnosis  ROS  Review (Popup)  Health Maintenance  Quality  BestPractice   Medications  SmartSets  SnapShot Encounters  Media :23}     Problem List Items Addressed This Visit        Cardiac and Vasculature    Essential hypertension (Chronic)    Current Assessment & Plan     Hypertension is improving with treatment.  Continue current treatment regimen.  Dietary sodium restriction.  Regular aerobic exercise.  Blood pressure will be reassessed at the next regular appointment.            Gastrointestinal Abdominal     Irritable bowel syndrome with constipation - Primary (Chronic)    Current Assessment & Plan     Improved - continue metamucil - daily walking.             Mental Health    Reactive depression (Chronic)    Relevant Medications    zolpidem (AMBIEN) 10 MG tablet       Musculoskeletal and Injuries    Primary osteoarthritis of right knee (Chronic)    Current Assessment & Plan     Improving post op  Continue home exercise and PT             Sleep    Primary insomnia (Chronic)    Current Assessment & Plan     Improved on ambien   Refill today  Contract updated          Relevant Medications    zolpidem (AMBIEN) 10 MG tablet        E-bianca report is reviewed:    I reviewed the document in the electronic form in the Epic EMR.    On review, prescriptions filled under controlled contract with this office are consistent with what is prescribed.   The patient has been using the same pharmacy.   There is not concern for aberrant behavior based on this ekasper review.    Follow Up {Instructions Charge Capture  Follow-up Communications :23}     Return in about 6 months (around 2/27/2022) for Medicare Wellness.    Patient was given instructions and counseling regarding her condition or for health maintenance advice. Please see specific information pulled into the AVS if appropriate.    Lorraine disclaimer:   Much of this encounter note is an electronic transcription/translation of spoken language to printed text. The electronic translation of spoken language may permit erroneous, or at  times, nonsensical words or phrases to be inadvertently transcribed; Although I have reviewed the note for such errors, some may still exist.     Additional Patient Counseling:       There are no Patient Instructions on file for this visit.

## 2021-09-08 ENCOUNTER — TELEPHONE (OUTPATIENT)
Dept: ORTHOPEDIC SURGERY | Facility: CLINIC | Age: 86
End: 2021-09-08

## 2021-09-08 NOTE — TELEPHONE ENCOUNTER
Caller: EMILY PETER    Relationship to patient: SELF    Best call back number: [phone]    Type of visit: 6 WEEK POST OP    Requested date: ANY    If rescheduling, when is the original appointment: 9/10/21    Additional notes: PATIENT DOES NOT HAVE TRANSPORTATION TO ORIGINAL APPOINTMENT. NEXT AVAIL WAS A WEEK OUT AND WASN'T SURE IF SHE SHOULD BE SEEN AT 6 WEEKS. PLEASE CALL TO RESCHEDULE

## 2021-09-09 NOTE — TELEPHONE ENCOUNTER
If patient cannot make her original appointment, then the next available appointment would be appropriate for the situation.

## 2021-09-16 ENCOUNTER — OFFICE VISIT (OUTPATIENT)
Dept: ORTHOPEDIC SURGERY | Facility: CLINIC | Age: 86
End: 2021-09-16

## 2021-09-16 VITALS — TEMPERATURE: 97.3 F | BODY MASS INDEX: 22.15 KG/M2 | WEIGHT: 125 LBS | HEIGHT: 63 IN

## 2021-09-16 DIAGNOSIS — R52 PAIN: Primary | ICD-10-CM

## 2021-09-16 DIAGNOSIS — Z96.651 STATUS POST RIGHT KNEE REPLACEMENT: ICD-10-CM

## 2021-09-16 PROCEDURE — 73562 X-RAY EXAM OF KNEE 3: CPT | Performed by: NURSE PRACTITIONER

## 2021-09-16 PROCEDURE — 99024 POSTOP FOLLOW-UP VISIT: CPT | Performed by: NURSE PRACTITIONER

## 2021-09-16 NOTE — PROGRESS NOTES
Veronica Babb : 1935 MRN: [id number] DATE: 2021    DIAGNOSIS: 8 week follow up right total knee      SUBJECTIVE:Patient returns today for 8 week follow up of right total knee replacement. Patient reports doing well with no unusual complaints. Appears to be progressing appropriately.    OBJECTIVE:   Exam:. The incision is well healed. No sign of infection. Range of motion is measured at 0 to 124. The calf is soft and nontender with a negative Homans sign. Strength is progressing and the patient is ambulating appropriately.    DIAGNOSTIC STUDIES  Xrays: 3 views of the right knee (AP, lateral, and sunrise) were ordered and reviewed for evaluation of recent knee replacement. They demonstrate a well positioned, well aligned knee replacement without complicating factors noted. In comparison with previous films there has been no change.    ASSESSMENT: 8 week status post right knee replacement.    PLAN: 1) Continue with PT exercises as prescribed   2) Follow up in 10 months for annual visit with ANGELIA Crawford  2021

## 2021-10-07 DIAGNOSIS — I10 ESSENTIAL HYPERTENSION: ICD-10-CM

## 2021-10-07 RX ORDER — LISINOPRIL 40 MG/1
40 TABLET ORAL DAILY
Qty: 90 TABLET | Refills: 1 | Status: SHIPPED | OUTPATIENT
Start: 2021-10-07 | End: 2022-05-09

## 2021-11-15 DIAGNOSIS — I10 ESSENTIAL HYPERTENSION: ICD-10-CM

## 2021-11-15 RX ORDER — AMLODIPINE BESYLATE 10 MG/1
10 TABLET ORAL DAILY
Qty: 90 TABLET | Refills: 3 | Status: SHIPPED | OUTPATIENT
Start: 2021-11-15 | End: 2023-01-11

## 2022-02-03 DIAGNOSIS — K58.1 IRRITABLE BOWEL SYNDROME WITH CONSTIPATION: ICD-10-CM

## 2022-02-03 RX ORDER — DICYCLOMINE HCL 20 MG
TABLET ORAL
Qty: 60 TABLET | Refills: 5 | Status: SHIPPED | OUTPATIENT
Start: 2022-02-03 | End: 2022-07-27

## 2022-02-15 DIAGNOSIS — F51.01 PRIMARY INSOMNIA: ICD-10-CM

## 2022-02-16 RX ORDER — ZOLPIDEM TARTRATE 10 MG/1
10 TABLET ORAL NIGHTLY PRN
Qty: 30 TABLET | Refills: 5 | Status: SHIPPED | OUTPATIENT
Start: 2022-02-16 | End: 2022-08-03

## 2022-03-15 ENCOUNTER — OFFICE VISIT (OUTPATIENT)
Dept: INTERNAL MEDICINE | Facility: CLINIC | Age: 87
End: 2022-03-15

## 2022-03-15 VITALS
TEMPERATURE: 98.2 F | BODY MASS INDEX: 21.62 KG/M2 | HEIGHT: 63 IN | OXYGEN SATURATION: 96 % | WEIGHT: 122 LBS | SYSTOLIC BLOOD PRESSURE: 112 MMHG | DIASTOLIC BLOOD PRESSURE: 74 MMHG | HEART RATE: 94 BPM

## 2022-03-15 DIAGNOSIS — K58.1 IRRITABLE BOWEL SYNDROME WITH CONSTIPATION: Chronic | ICD-10-CM

## 2022-03-15 DIAGNOSIS — F32.9 REACTIVE DEPRESSION: Chronic | ICD-10-CM

## 2022-03-15 DIAGNOSIS — I10 ESSENTIAL HYPERTENSION: Primary | Chronic | ICD-10-CM

## 2022-03-15 DIAGNOSIS — F51.01 PRIMARY INSOMNIA: Chronic | ICD-10-CM

## 2022-03-15 PROCEDURE — 99214 OFFICE O/P EST MOD 30 MIN: CPT | Performed by: NURSE PRACTITIONER

## 2022-03-15 NOTE — PROGRESS NOTES
Chief Complaint  Hypertension     Subjective:      History of Present Illness {CC  Problem List  Visit  Diagnosis   Encounters  Notes  Medications  Labs  Result Review Imaging  Media :23}     Veronica Babb presents to [facility] for chronic medical conditions including: Hypertension, migraines, insomnia, depression/anxiety, GERD, OAB    Hypertension: chronic.  No CP, SOA and continues norvasc (no edema) and lisinopril     Depression: No longer taking lexapro (removed from list)     OA: s/p right total knee   Still ambulating with cane.  She has completed PT.   She had some postop anemia - needs CBC rechecked today. No dark or bloody stools.     I have reviewed patient's medical history, any new submitted information provided by patient or medical assistant and updated medical record.      Objective:      Physical Exam  Vitals reviewed.   Constitutional:       Appearance: Normal appearance. She is well-developed.   HENT:      Head:      Comments: Wearing mask due to COVID   Neck:      Thyroid: No thyromegaly.   Cardiovascular:      Rate and Rhythm: Normal rate and regular rhythm.      Pulses: Normal pulses.      Heart sounds: Normal heart sounds.   Pulmonary:      Effort: Pulmonary effort is normal.      Breath sounds: Normal breath sounds.      Comments: E/U   Musculoskeletal:      Right lower leg: No edema.      Left lower leg: No edema.   Lymphadenopathy:      Cervical: No cervical adenopathy.   Skin:     General: Skin is warm and dry.   Neurological:      Mental Status: She is alert and oriented to person, place, and time.   Psychiatric:         Mood and Affect: Mood normal.         Behavior: Behavior normal. Behavior is cooperative.         Thought Content: Thought content normal.         Judgment: Judgment normal.        Result Review  Data Reviewed:{ Labs  Result Review  Imaging  Med Tab  Media :23}     The following data was reviewed by:  "Josef Kemp III, NP-C on 03/15/2022  Common labs    Common Labsle 6/24/21 6/24/21 7/8/21 7/9/21    1004 1004     Glucose  92     BUN  13     Creatinine  0.78     eGFR Non African Am  70     Sodium  133 (A)     Potassium  4.6     Chloride  99     Calcium  9.7     WBC 5.68      Hemoglobin 12.3  9.2 (A) 8.0 (A)   Hematocrit 37.6  27.9 (A) 24.2 (A)   Platelets 251      (A) Abnormal value                   Vital Signs:   /74 (BP Location: Left arm, Patient Position: Sitting, Cuff Size: Adult)   Pulse 94   Temp 98.2 °F (36.8 °C) (Temporal)   Ht 160 cm (63\")   Wt 55.3 kg (122 lb)   SpO2 96%   BMI 21.61 kg/m²         Requested Prescriptions      No prescriptions requested or ordered in this encounter       Routine medications provided by this office will also be refilled via pharmacy request.       Current Outpatient Medications:   •  amLODIPine (NORVASC) 10 MG tablet, TAKE 1 TABLET BY MOUTH DAILY, Disp: 90 tablet, Rfl: 3  •  dicyclomine (BENTYL) 20 MG tablet, TAKE 1 TABLET BY MOUTH EVERY 6 HOURS, Disp: 60 tablet, Rfl: 5  •  diphenhydrAMINE (BENADRYL) 25 mg capsule, Take 25 mg by mouth Every 6 (Six) Hours As Needed for Itching., Disp: , Rfl:   •  lisinopril (PRINIVIL,ZESTRIL) 40 MG tablet, TAKE 1 TABLET BY MOUTH DAILY, Disp: 90 tablet, Rfl: 1  •  promethazine (PHENERGAN) 25 MG tablet, TAKE 1/2 TABLET BY MOUTH EVERY 8 HOURS AS NEEDED FOR NAUSEA OR VOMITING, Disp: 20 tablet, Rfl: 0  •  SUMAtriptan (IMITREX) 100 MG tablet, TAKE 1 TABLET BY MOUTH AT ONSET OF HEADACHE. MAY REPEAT DOSE 1 TIME IN 2 HOURS IF HEADACHE NOT RELIEVED, Disp: 12 tablet, Rfl: 1  •  zolpidem (AMBIEN) 10 MG tablet, TAKE 1 TABLET BY MOUTH AT NIGHT AS NEEDED FOR SLEEP, Disp: 30 tablet, Rfl: 5     Assessment and Plan:      Assessment and Plan {CC Problem List  Visit Diagnosis  ROS  Review (Popup)  Health Maintenance  Quality  BestPractice  Medications  SmartSets  SnapShot Encounters  Media :23}     Problem List Items " Addressed This Visit        Cardiac and Vasculature    Essential hypertension - Primary (Chronic)    Current Assessment & Plan     Hypertension is improving with treatment.  Continue current treatment regimen.  Dietary sodium restriction.  Continue current medications.  Blood pressure will be reassessed at the next regular appointment.           Relevant Orders    Comprehensive Metabolic Panel    CBC (No Diff)       Gastrointestinal Abdominal     Irritable bowel syndrome with constipation (Chronic)    Overview     3/15/2022: states she is also taking align               Mental Health    Reactive depression (Chronic)    Current Assessment & Plan     Patient's depression is improved.   Now off lexapro.               Sleep    Primary insomnia (Chronic)    Current Assessment & Plan     Stable on current treatment                   Follow Up {Instructions Charge Capture  Follow-up Communications :23}     Return in about 6 months (around 9/15/2022) for Medicare Wellness.    Patient was given instructions and counseling regarding her condition or for health maintenance advice. Please see specific information pulled into the AVS if appropriate.    Dragon disclaimer:   Much of this encounter note is an electronic transcription/translation of spoken language to printed text. The electronic translation of spoken language may permit erroneous, or at times, nonsensical words or phrases to be inadvertently transcribed; Although I have reviewed the note for such errors, some may still exist.     Additional Patient Counseling:       There are no Patient Instructions on file for this visit.

## 2022-03-16 LAB
ALBUMIN SERPL-MCNC: 4.7 G/DL (ref 3.6–4.6)
ALBUMIN/GLOB SERPL: 1.7 {RATIO} (ref 1.2–2.2)
ALP SERPL-CCNC: 88 IU/L (ref 44–121)
ALT SERPL-CCNC: 7 IU/L (ref 0–32)
AST SERPL-CCNC: 18 IU/L (ref 0–40)
BILIRUB SERPL-MCNC: 0.2 MG/DL (ref 0–1.2)
BUN SERPL-MCNC: 10 MG/DL (ref 8–27)
BUN/CREAT SERPL: 14 (ref 12–28)
CALCIUM SERPL-MCNC: 9.4 MG/DL (ref 8.7–10.3)
CHLORIDE SERPL-SCNC: 98 MMOL/L (ref 96–106)
CO2 SERPL-SCNC: 25 MMOL/L (ref 20–29)
CREAT SERPL-MCNC: 0.72 MG/DL (ref 0.57–1)
EGFRCR SERPLBLD CKD-EPI 2021: 82 ML/MIN/1.73
GLOBULIN SER CALC-MCNC: 2.8 G/DL (ref 1.5–4.5)
GLUCOSE SERPL-MCNC: 86 MG/DL (ref 65–99)
HCT VFR BLD AUTO: NORMAL %
HGB BLD-MCNC: NORMAL G/DL
NRBC BLD AUTO-RTO: NORMAL %
PLATELET # BLD AUTO: NORMAL 10*3/UL
POTASSIUM SERPL-SCNC: 5.2 MMOL/L (ref 3.5–5.2)
PROT SERPL-MCNC: 7.5 G/DL (ref 6–8.5)
RBC # BLD AUTO: NORMAL 10*6/UL
SODIUM SERPL-SCNC: 135 MMOL/L (ref 134–144)
SPECIMEN STATUS: NORMAL
WBC # BLD AUTO: NORMAL X10E3/UL

## 2022-03-22 DIAGNOSIS — R11.0 NAUSEA: ICD-10-CM

## 2022-03-23 RX ORDER — PROMETHAZINE HYDROCHLORIDE 25 MG/1
TABLET ORAL
Qty: 20 TABLET | Refills: 0 | Status: SHIPPED | OUTPATIENT
Start: 2022-03-23 | End: 2022-08-12

## 2022-05-08 DIAGNOSIS — I10 ESSENTIAL HYPERTENSION: ICD-10-CM

## 2022-05-09 RX ORDER — LISINOPRIL 40 MG/1
40 TABLET ORAL DAILY
Qty: 90 TABLET | Refills: 1 | Status: SHIPPED | OUTPATIENT
Start: 2022-05-09 | End: 2022-10-18

## 2022-07-27 DIAGNOSIS — K58.1 IRRITABLE BOWEL SYNDROME WITH CONSTIPATION: ICD-10-CM

## 2022-07-27 RX ORDER — DICYCLOMINE HCL 20 MG
TABLET ORAL
Qty: 60 TABLET | Refills: 5 | Status: SHIPPED | OUTPATIENT
Start: 2022-07-27 | End: 2023-02-15

## 2022-08-02 ENCOUNTER — TELEPHONE (OUTPATIENT)
Dept: INTERNAL MEDICINE | Facility: CLINIC | Age: 87
End: 2022-08-02

## 2022-08-02 NOTE — TELEPHONE ENCOUNTER
Advised patient that we can get her scheduled a video visit on mychart or doxmitranjan. She said that she was going to go to [facility] in [facility].

## 2022-08-02 NOTE — TELEPHONE ENCOUNTER
Caller: Veronica Babb    Relationship to patient: Self    Best call back number: [phone]    Date of exposure: UNKNOWN    Date of positive COVID19 test: 8/1    Date if possible COVID19 exposure: PATIENT ONLY GOES TO THE [facility] , PLUS ALWAYS WEARING A MASK.    COVID19 symptoms: SCRATCHING THROAT, HACKING COUGH, RUNNY NOSE.  FEELS LIKE USUAL ALLERGIES    Date of initial quarantine: 8/1    Additional information or concerns: PLEASE CONTACT PATIENT FOR CARE AND QUARANTINE INSTRUCTIONS.    What is the patients preferred pharmacy: [id number] [phone]

## 2022-08-03 DIAGNOSIS — F51.01 PRIMARY INSOMNIA: ICD-10-CM

## 2022-08-03 RX ORDER — ZOLPIDEM TARTRATE 10 MG/1
10 TABLET ORAL NIGHTLY PRN
Qty: 30 TABLET | Refills: 5 | Status: SHIPPED | OUTPATIENT
Start: 2022-08-03 | End: 2023-01-26

## 2022-08-11 DIAGNOSIS — R11.0 NAUSEA: ICD-10-CM

## 2022-08-12 RX ORDER — PROMETHAZINE HYDROCHLORIDE 25 MG/1
TABLET ORAL
Qty: 20 TABLET | Refills: 0 | Status: SHIPPED | OUTPATIENT
Start: 2022-08-12 | End: 2022-12-22

## 2022-08-31 DIAGNOSIS — G43.019 INTRACTABLE MIGRAINE WITHOUT AURA AND WITHOUT STATUS MIGRAINOSUS: ICD-10-CM

## 2022-09-01 RX ORDER — SUMATRIPTAN 100 MG/1
TABLET, FILM COATED ORAL
Qty: 12 TABLET | Refills: 1 | Status: SHIPPED | OUTPATIENT
Start: 2022-09-01 | End: 2023-02-23

## 2022-10-17 ENCOUNTER — OFFICE VISIT (OUTPATIENT)
Dept: INTERNAL MEDICINE | Facility: CLINIC | Age: 87
End: 2022-10-17

## 2022-10-17 VITALS
WEIGHT: 123.2 LBS | SYSTOLIC BLOOD PRESSURE: 130 MMHG | BODY MASS INDEX: 21.83 KG/M2 | DIASTOLIC BLOOD PRESSURE: 80 MMHG | HEART RATE: 76 BPM | HEIGHT: 63 IN | OXYGEN SATURATION: 99 % | TEMPERATURE: 98 F

## 2022-10-17 DIAGNOSIS — F51.01 PRIMARY INSOMNIA: Chronic | ICD-10-CM

## 2022-10-17 DIAGNOSIS — K58.1 IRRITABLE BOWEL SYNDROME WITH CONSTIPATION: Chronic | ICD-10-CM

## 2022-10-17 DIAGNOSIS — Z23 NEED FOR INFLUENZA VACCINATION: ICD-10-CM

## 2022-10-17 DIAGNOSIS — M54.42 ACUTE MIDLINE LOW BACK PAIN WITH LEFT-SIDED SCIATICA: ICD-10-CM

## 2022-10-17 DIAGNOSIS — Z00.00 MEDICARE ANNUAL WELLNESS VISIT, SUBSEQUENT: Primary | ICD-10-CM

## 2022-10-17 DIAGNOSIS — I10 ESSENTIAL HYPERTENSION: Chronic | ICD-10-CM

## 2022-10-17 DIAGNOSIS — G43.019 INTRACTABLE MIGRAINE WITHOUT AURA AND WITHOUT STATUS MIGRAINOSUS: Chronic | ICD-10-CM

## 2022-10-17 PROCEDURE — G0439 PPPS, SUBSEQ VISIT: HCPCS | Performed by: NURSE PRACTITIONER

## 2022-10-17 PROCEDURE — 90662 IIV NO PRSV INCREASED AG IM: CPT | Performed by: NURSE PRACTITIONER

## 2022-10-17 PROCEDURE — 1170F FXNL STATUS ASSESSED: CPT | Performed by: NURSE PRACTITIONER

## 2022-10-17 PROCEDURE — 1125F AMNT PAIN NOTED PAIN PRSNT: CPT | Performed by: NURSE PRACTITIONER

## 2022-10-17 PROCEDURE — G0008 ADMIN INFLUENZA VIRUS VAC: HCPCS | Performed by: NURSE PRACTITIONER

## 2022-10-17 PROCEDURE — 1159F MED LIST DOCD IN RCRD: CPT | Performed by: NURSE PRACTITIONER

## 2022-10-17 RX ORDER — METHYLPREDNISOLONE 4 MG/1
TABLET ORAL
Qty: 21 EACH | Refills: 0 | Status: SHIPPED | OUTPATIENT
Start: 2022-10-17

## 2022-10-17 NOTE — PATIENT INSTRUCTIONS
Medicare Wellness  Personal Prevention Plan of Service     Date of Office Visit:    Encounter Provider:  Josef Kemp III, NP-C  Place of Service:  [facility]  Patient Name: Veronica Babb  :  1935    As part of the Medicare Wellness portion of your visit today, we are providing you with this personalized preventive plan of services (PPPS). This plan is based upon recommendations of the [place] Preventive Services Task Force (USPSTF) and the Advisory Committee on Immunization Practices (ACIP).    This lists the preventive care services that should be considered, and provides dates of when you are due. Items listed as completed are up-to-date and do not require any further intervention.    Health Maintenance   Topic Date Due    DXA SCAN  Never done    ZOSTER VACCINE (2 of 3) 2011    COVID-19 Vaccine (4 - Booster for Pfizer series) 2022    INFLUENZA VACCINE  2022    ANNUAL WELLNESS VISIT  10/17/2023    TDAP/TD VACCINES (2 - Td or Tdap) 10/01/2024    Pneumococcal Vaccine 65+  Completed       Orders Placed This Encounter   Procedures    Fluzone High-Dose 65+yrs ([id number])       Return in about 6 months (around 2023).

## 2022-10-17 NOTE — PROGRESS NOTES
The ABCs of the Annual Wellness Visit  Subsequent Medicare Wellness Visit    Chief Complaint   Patient presents with   • Medicare Wellness-subsequent      Subjective    History of Present Illness:  Veronica Babb is a 87 y.o. female who presents for a Subsequent Medicare Wellness Visit.  She has chronic medical conditions including: Hypertension, migraines, insomnia, depression/anxiety, GERD, OAB    She states she has been doing well.  No GI issues.  Migraines well controlled.   She did stand up recently and felt a sharp pain from back to left hip.  Still will cause some pain with movement.  No change in B/B or leg weakness.     BP has been controlled: no CP, SOA    Lives in her home.  Her son has been living there for about a year while he has been getting back on his feet.       The following portions of the patient's history were reviewed and   updated as appropriate: allergies, current medications, past family history, past medical history, past social history, past surgical history and problem list.    Compared to one year ago, the patient feels her physical   health is the same.    Compared to one year ago, the patient feels her mental   health is the same.    Recent Hospitalizations:  She was not admitted to the hospital during the last year.       Current Medical Providers:  Patient Care Team:  Josef Kemp III [facility] as PCP - General (Family Medicine)    Outpatient Medications Prior to Visit   Medication Sig Dispense Refill   • amLODIPine (NORVASC) 10 MG tablet TAKE 1 TABLET BY MOUTH DAILY 90 tablet 3   • dicyclomine (BENTYL) 20 MG tablet TAKE 1 TABLET BY MOUTH EVERY 6 HOURS 60 tablet 5   • diphenhydrAMINE (BENADRYL) 25 mg capsule Take 25 mg by mouth Every 6 (Six) Hours As Needed for Itching.     • lisinopril (PRINIVIL,ZESTRIL) 40 MG tablet TAKE 1 TABLET BY MOUTH DAILY 90 tablet 1   • promethazine (PHENERGAN) 25 MG tablet TAKE 1/2 TABLET BY MOUTH EVERY 8 HOURS AS NEEDED FOR NAUSEA OR VOMITING  "20 tablet 0   • SUMAtriptan (IMITREX) 100 MG tablet TAKE 1 TABLET BY MOUTH AT ONSET OF HEADACHE. MAY REPEAT DOSE 1 TIME IN 2 HOURS IF HEADACHE NOT RELIEVED 12 tablet 1   • zolpidem (AMBIEN) 10 MG tablet TAKE 1 TABLET BY MOUTH AT NIGHT AS NEEDED FOR SLEEP 30 tablet 5     No facility-administered medications prior to visit.       No opioid medication identified on active medication list. I have reviewed chart for other potential  high risk medication/s and harmful drug interactions in the elderly.          Aspirin is not on active medication list.  Aspirin use is not indicated based on review of current medical condition/s. Risk of harm outweighs potential benefits.  .    Patient Active Problem List   Diagnosis   • Primary insomnia   • Lumbar disc disease   • Trapezius muscle spasm   • Knee injury   • Primary osteoarthritis of right knee   • Status post total hip replacement, right   • Essential hypertension   • Gastroesophageal reflux disease with esophagitis   • Migraine without aura   • Overactive bladder   • Cervical disc disease   • Irritable bowel syndrome with constipation   • Hyponatremia   • Reactive depression   • OA (osteoarthritis) of knee     Advance Care Planning  Advance Directive is not on file.  ACP discussion was held with the patient during this visit. Patient has an advance directive (not in EMR), copy requested.          Objective    Vitals:    10/17/22 1501   BP: 130/80   BP Location: Left arm   Patient Position: Sitting   Cuff Size: Adult   Pulse: 76   Temp: 98 °F (36.7 °C)   TempSrc: Temporal   SpO2: 99%   Weight: 55.9 kg (123 lb 3.2 oz)   Height: 160 cm (63\")   PainSc:   6   PainLoc: Groin     Estimated body mass index is 21.82 kg/m² as calculated from the following:    Height as of this encounter: 160 cm (63\").    Weight as of this encounter: 55.9 kg (123 lb 3.2 oz).    BMI is within normal parameters. No other follow-up for BMI required.      Does the patient have evidence of cognitive " impairment? No    Physical Exam  Vitals reviewed.   Constitutional:       Appearance: Normal appearance. She is well-developed.   HENT:      Head:      Comments: Wearing mask due to COVID   Neck:      Thyroid: No thyromegaly.   Cardiovascular:      Rate and Rhythm: Normal rate and regular rhythm.      Pulses: Normal pulses.      Heart sounds: Normal heart sounds.   Pulmonary:      Effort: Pulmonary effort is normal.      Breath sounds: Normal breath sounds.      Comments: E/U   Abdominal:      General: Bowel sounds are normal.      Palpations: Abdomen is soft.   Musculoskeletal:      Cervical back: Neck supple.      Right lower leg: No edema.      Left lower leg: No edema.   Lymphadenopathy:      Cervical: No cervical adenopathy.   Skin:     General: Skin is warm and dry.      Capillary Refill: Capillary refill takes 2 to 3 seconds.   Neurological:      Mental Status: She is alert and oriented to person, place, and time.      Sensory: No sensory deficit.      Motor: No weakness.   Psychiatric:         Mood and Affect: Mood normal.         Behavior: Behavior normal. Behavior is cooperative.         Thought Content: Thought content normal.         Judgment: Judgment normal.                 HEALTH RISK ASSESSMENT    Smoking Status:  Social History     Tobacco Use   Smoking Status Former   • Packs/day: 0.25   • Years: 5.00   • Pack years: 1.25   • Types: Cigarettes   Smokeless Tobacco Never   Tobacco Comments    IN HER 20'S     Alcohol Consumption:  Social History     Substance and Sexual Activity   Alcohol Use No     Fall Risk Screen:    NISREEN Fall Risk Assessment was completed, and patient is at MODERATE risk for falls. Assessment completed on:10/17/2022    Depression Screening:  PHQ-2/PHQ-9 Depression Screening 10/17/2022   Retired Total Score -   Little Interest or Pleasure in Doing Things 0-->not at all   Feeling Down, Depressed or Hopeless 0-->not at all   PHQ-9: Brief Depression Severity Measure Score 0        Health Habits and Functional and Cognitive Screening:  Functional & Cognitive Status 10/17/2022   Do you have difficulty preparing food and eating? No   Do you have difficulty bathing yourself, getting dressed or grooming yourself? Yes   Do you have difficulty using the toilet? No   Do you have difficulty moving around from place to place? No   Do you have trouble with steps or getting out of a bed or a chair? No   Current Diet Unhealthy Diet   Dental Exam Not up to date   Eye Exam Not up to date   Exercise (times per week) 0 times per week   Current Exercises Include No Regular Exercise   Do you need help using the phone?  No   Are you deaf or do you have serious difficulty hearing?  Yes   Do you need help with transportation? Yes   Do you need help shopping? No   Do you need help preparing meals?  No   Do you need help with housework?  No   Do you need help with laundry? No   Do you need help taking your medications? No   Do you need help managing money? No   Do you ever drive or ride in a car without wearing a seat belt? No   Have you felt unusual stress, anger or loneliness in the last month? Yes   Who do you live with? Child   If you need help, do you have trouble finding someone available to you? No   Have you been bothered in the last four weeks by sexual problems? No   Do you have difficulty concentrating, remembering or making decisions? Yes       Age-appropriate Screening Schedule:  Refer to the list below for future screening recommendations based on patient's age, sex and/or medical conditions. Orders for these recommended tests are listed in the plan section. The patient has been provided with a written plan.    Health Maintenance   Topic Date Due   • DXA SCAN  Never done   • ZOSTER VACCINE (2 of 3) 02/26/2011   • TDAP/TD VACCINES (2 - Td or Tdap) 10/01/2024   • INFLUENZA VACCINE  Completed              Assessment & Plan   CMS Preventative Services Quick Reference  Risk Factors Identified During  Encounter  Immunizations Discussed/Encouraged (specific Immunizations; Influenza  The above risks/problems have been discussed with the patient.  Follow up actions/plans if indicated are seen below in the Assessment/Plan Section.  Pertinent information has been shared with the patient in the After Visit Summary.    Diagnoses and all orders for this visit:    1. Medicare annual wellness visit, subsequent (Primary)    2. Essential hypertension    3. Primary insomnia    4. Irritable bowel syndrome with constipation    5. Intractable migraine without aura and without status migrainosus    6. Need for influenza vaccination  -     Fluzone High-Dose 65+yrs ([id number])    7. Acute midline low back pain with left-sided sciatica  -     methylPREDNISolone (MEDROL) 4 MG dose pack; Take as directed on package instructions.  Dispense: 21 each; Refill: 0      Patient controlled on current medication.   Will give her medrol dose pack for LBP, advised ROM exercises.   She will notify me if worsens or doesn't improve.     Follow Up:   Return in about 6 months (around 4/17/2023).     An After Visit Summary and PPPS were made available to the patient.

## 2022-10-18 DIAGNOSIS — I10 ESSENTIAL HYPERTENSION: ICD-10-CM

## 2022-10-18 RX ORDER — LISINOPRIL 40 MG/1
40 TABLET ORAL DAILY
Qty: 90 TABLET | Refills: 1 | Status: SHIPPED | OUTPATIENT
Start: 2022-10-18

## 2022-12-21 DIAGNOSIS — R11.0 NAUSEA: ICD-10-CM

## 2022-12-22 RX ORDER — PROMETHAZINE HYDROCHLORIDE 25 MG/1
TABLET ORAL
Qty: 20 TABLET | Refills: 0 | Status: SHIPPED | OUTPATIENT
Start: 2022-12-22

## 2023-01-11 DIAGNOSIS — I10 ESSENTIAL HYPERTENSION: ICD-10-CM

## 2023-01-11 RX ORDER — AMLODIPINE BESYLATE 10 MG/1
10 TABLET ORAL DAILY
Qty: 90 TABLET | Refills: 3 | Status: SHIPPED | OUTPATIENT
Start: 2023-01-11

## 2023-01-25 DIAGNOSIS — F51.01 PRIMARY INSOMNIA: ICD-10-CM

## 2023-01-26 RX ORDER — ZOLPIDEM TARTRATE 10 MG/1
10 TABLET ORAL NIGHTLY PRN
Qty: 30 TABLET | Refills: 5 | Status: SHIPPED | OUTPATIENT
Start: 2023-01-26

## 2023-01-26 NOTE — TELEPHONE ENCOUNTER
Rx Refill Note  Requested Prescriptions     Pending Prescriptions Disp Refills   • zolpidem (AMBIEN) 10 MG tablet [Pharmacy Med Name: ZOLPIDEM 10MG TABLETS] 30 tablet      Sig: TAKE 1 TABLET BY MOUTH AT NIGHT AS NEEDED FOR SLEEP      Last office visit with prescribing clinician: 10/17/2022   Last telemedicine visit with prescribing clinician: 4/19/2023   Next office visit with prescribing clinician: 4/19/2023         Sun Whitlock MA  01/26/23, 08:02 EST

## 2023-02-13 DIAGNOSIS — K58.1 IRRITABLE BOWEL SYNDROME WITH CONSTIPATION: ICD-10-CM

## 2023-02-15 DIAGNOSIS — K58.1 IRRITABLE BOWEL SYNDROME WITH CONSTIPATION: ICD-10-CM

## 2023-02-15 RX ORDER — DICYCLOMINE HCL 20 MG
TABLET ORAL
Qty: 60 TABLET | Refills: 5 | OUTPATIENT
Start: 2023-02-15

## 2023-02-15 RX ORDER — DICYCLOMINE HCL 20 MG
TABLET ORAL
Qty: 60 TABLET | Refills: 5 | Status: SHIPPED | OUTPATIENT
Start: 2023-02-15

## 2023-02-23 DIAGNOSIS — G43.019 INTRACTABLE MIGRAINE WITHOUT AURA AND WITHOUT STATUS MIGRAINOSUS: ICD-10-CM

## 2023-02-23 RX ORDER — SUMATRIPTAN 100 MG/1
TABLET, FILM COATED ORAL
Qty: 12 TABLET | Refills: 1 | Status: SHIPPED | OUTPATIENT
Start: 2023-02-23

## 2023-04-19 ENCOUNTER — OFFICE VISIT (OUTPATIENT)
Dept: INTERNAL MEDICINE | Facility: CLINIC | Age: 88
End: 2023-04-19
Payer: MEDICARE

## 2023-04-19 VITALS
DIASTOLIC BLOOD PRESSURE: 61 MMHG | WEIGHT: 119.6 LBS | BODY MASS INDEX: 21.19 KG/M2 | OXYGEN SATURATION: 100 % | HEART RATE: 58 BPM | HEIGHT: 63 IN | SYSTOLIC BLOOD PRESSURE: 132 MMHG

## 2023-04-19 DIAGNOSIS — M54.2 NECK PAIN: ICD-10-CM

## 2023-04-19 DIAGNOSIS — I10 ESSENTIAL HYPERTENSION: Primary | Chronic | ICD-10-CM

## 2023-04-19 DIAGNOSIS — F51.01 PRIMARY INSOMNIA: Chronic | ICD-10-CM

## 2023-04-19 PROCEDURE — 1159F MED LIST DOCD IN RCRD: CPT | Performed by: NURSE PRACTITIONER

## 2023-04-19 PROCEDURE — 1160F RVW MEDS BY RX/DR IN RCRD: CPT | Performed by: NURSE PRACTITIONER

## 2023-04-19 PROCEDURE — 99214 OFFICE O/P EST MOD 30 MIN: CPT | Performed by: NURSE PRACTITIONER

## 2023-04-19 PROCEDURE — 1170F FXNL STATUS ASSESSED: CPT | Performed by: NURSE PRACTITIONER

## 2023-04-19 RX ORDER — MELOXICAM 15 MG/1
15 TABLET ORAL DAILY
Qty: 30 TABLET | Refills: 2 | Status: SHIPPED | OUTPATIENT
Start: 2023-04-19

## 2023-04-19 NOTE — PROGRESS NOTES
Chief Complaint  Hypertension     Subjective:      History of Present Illness {CC  Problem List  Visit  Diagnosis   Encounters  Notes  Medications  Labs  Result Review Imaging  Media :23}     Veronica Babb presents to [facility] for:  chronic medical conditions including: Hypertension, migraines, insomnia, depression/anxiety, GERD, OAB     Hypertension: chronic.  No CP, SOA and continues lisinopril      Depression: No longer taking lexapro (removed from list)  Did get a bit down about her first  passing last year.  They had reconnected socially.     Insomnia: continues ambien as needed    Neck pain: dull ache, left of neck when turns to left.  No injury.   No arm weakness. No HA    Hx trap muscle spasms       I have reviewed patient's medical history, any new submitted information provided by patient or medical assistant and updated medical record.      Objective:      Physical Exam  Vitals reviewed.   Constitutional:       Appearance: Normal appearance. She is well-developed.   Neck:      Thyroid: No thyromegaly.   Cardiovascular:      Rate and Rhythm: Normal rate and regular rhythm.      Pulses: Normal pulses.      Heart sounds: Normal heart sounds.   Pulmonary:      Effort: Pulmonary effort is normal.      Breath sounds: Normal breath sounds.      Comments: E/U   Musculoskeletal:      Cervical back: Normal range of motion.   Lymphadenopathy:      Cervical: No cervical adenopathy.   Skin:     General: Skin is warm and dry.      Capillary Refill: Capillary refill takes 2 to 3 seconds.   Neurological:      Mental Status: She is alert and oriented to person, place, and time.   Psychiatric:         Mood and Affect: Mood normal.         Behavior: Behavior normal. Behavior is cooperative.         Thought Content: Thought content normal.         Judgment: Judgment normal.        Result Review  Data Reviewed:{ Labs  Result Review  Imaging  Med Tab  Media :23}  "    The following data was reviewed by: Josef Kemp III, NP-C on 04/19/2023             Vital Signs:   /61 (BP Location: Left arm, Patient Position: Sitting, Cuff Size: Adult)   Pulse 58   Ht 160 cm (63\")   Wt 54.3 kg (119 lb 9.6 oz)   SpO2 100%   BMI 21.19 kg/m²         Requested Prescriptions     Signed Prescriptions Disp Refills   • meloxicam (Mobic) 15 MG tablet 30 tablet 2     Sig: Take 1 tablet by mouth Daily. For neck pain. Take with food.       Routine medications provided by this office will also be refilled via pharmacy request.       Current Outpatient Medications:   •  dicyclomine (BENTYL) 20 MG tablet, TAKE 1 TABLET BY MOUTH EVERY 6 HOURS, Disp: 60 tablet, Rfl: 5  •  diphenhydrAMINE (BENADRYL) 25 mg capsule, Take 1 capsule by mouth Every 6 (Six) Hours As Needed for Itching., Disp: , Rfl:   •  lisinopril (PRINIVIL,ZESTRIL) 40 MG tablet, TAKE 1 TABLET BY MOUTH DAILY, Disp: 90 tablet, Rfl: 1  •  SUMAtriptan (IMITREX) 100 MG tablet, TAKE 1 TABLET BY MOUTH AT ONSET OF HEADACHE. MAY REPEAT DOSE 1 TIME IN 2 HOURS IF HEADACHE NOT RELIEVED, Disp: 12 tablet, Rfl: 1  •  zolpidem (AMBIEN) 10 MG tablet, TAKE 1 TABLET BY MOUTH AT NIGHT AS NEEDED FOR SLEEP, Disp: 30 tablet, Rfl: 5  •  meloxicam (Mobic) 15 MG tablet, Take 1 tablet by mouth Daily. For neck pain. Take with food., Disp: 30 tablet, Rfl: 2     Assessment and Plan:      Assessment and Plan {CC Problem List  Visit Diagnosis  ROS  Review (Popup)  Health Maintenance  Quality  BestPractice  Medications  SmartSets  SnapShot Encounters  Media :23}     Problem List Items Addressed This Visit        Cardiac and Vasculature    Essential hypertension - Primary (Chronic)    Overview     Prior treatment:  norvasc          Current Assessment & Plan     Hypertension is improving with treatment.  Continue current treatment regimen.  Blood pressure will be reassessed at the next regular appointment.         Relevant Orders    CBC (No " Diff)    Basic Metabolic Panel       Sleep    Primary insomnia (Chronic)    Current Assessment & Plan     ambien effective and sleep improved.         Other Visit Diagnoses     Neck pain        Relevant Medications    meloxicam (Mobic) 15 MG tablet          Follow Up {Instructions Charge Capture  Follow-up Communications :23}     Return in about 6 months (around 10/19/2023) for Medicare Wellness.      Patient was given instructions and counseling regarding her condition or for health maintenance advice. Please see specific information pulled into the AVS if appropriate.    Dragon disclaimer:   Much of this encounter note is an electronic transcription/translation of spoken language to printed text. The electronic translation of spoken language may permit erroneous, or at times, nonsensical words or phrases to be inadvertently transcribed; Although I have reviewed the note for such errors, some may still exist.     Additional Patient Counseling:       There are no Patient Instructions on file for this visit.

## 2023-04-20 LAB
BUN SERPL-MCNC: 14 MG/DL (ref 8–23)
BUN/CREAT SERPL: 19.4 (ref 7–25)
CALCIUM SERPL-MCNC: 10 MG/DL (ref 8.6–10.5)
CHLORIDE SERPL-SCNC: 100 MMOL/L (ref 98–107)
CO2 SERPL-SCNC: 25.3 MMOL/L (ref 22–29)
CREAT SERPL-MCNC: 0.72 MG/DL (ref 0.57–1)
EGFRCR SERPLBLD CKD-EPI 2021: 81.5 ML/MIN/1.73
ERYTHROCYTE [DISTWIDTH] IN BLOOD BY AUTOMATED COUNT: 12.4 % (ref 12.3–15.4)
GLUCOSE SERPL-MCNC: 100 MG/DL (ref 65–99)
HCT VFR BLD AUTO: 37 % (ref 34–46.6)
HGB BLD-MCNC: 12.5 G/DL (ref 12–15.9)
MCH RBC QN AUTO: 30.9 PG (ref 26.6–33)
MCHC RBC AUTO-ENTMCNC: 33.8 G/DL (ref 31.5–35.7)
MCV RBC AUTO: 91.4 FL (ref 79–97)
PLATELET # BLD AUTO: 236 10*3/MM3 (ref 140–450)
POTASSIUM SERPL-SCNC: 4.8 MMOL/L (ref 3.5–5.2)
RBC # BLD AUTO: 4.05 10*6/MM3 (ref 3.77–5.28)
SODIUM SERPL-SCNC: 138 MMOL/L (ref 136–145)
WBC # BLD AUTO: 5.47 10*3/MM3 (ref 3.4–10.8)

## 2023-04-28 DIAGNOSIS — I10 ESSENTIAL HYPERTENSION: ICD-10-CM

## 2023-04-28 RX ORDER — LISINOPRIL 40 MG/1
40 TABLET ORAL DAILY
Qty: 90 TABLET | Refills: 1 | Status: SHIPPED | OUTPATIENT
Start: 2023-04-28

## 2023-05-16 ENCOUNTER — OFFICE VISIT (OUTPATIENT)
Dept: INTERNAL MEDICINE | Facility: CLINIC | Age: 88
End: 2023-05-16
Payer: MEDICARE

## 2023-05-16 VITALS
HEIGHT: 63 IN | HEART RATE: 77 BPM | SYSTOLIC BLOOD PRESSURE: 132 MMHG | WEIGHT: 121 LBS | BODY MASS INDEX: 21.44 KG/M2 | OXYGEN SATURATION: 96 % | DIASTOLIC BLOOD PRESSURE: 70 MMHG

## 2023-05-16 DIAGNOSIS — H91.93 DECREASED HEARING OF BOTH EARS: ICD-10-CM

## 2023-05-16 DIAGNOSIS — R35.0 FREQUENT URINATION: Primary | ICD-10-CM

## 2023-05-16 DIAGNOSIS — M62.838 MUSCLE SPASMS OF NECK: ICD-10-CM

## 2023-05-16 DIAGNOSIS — R35.0 FREQUENT URINATION: ICD-10-CM

## 2023-05-16 LAB
BACTERIA UR QL AUTO: ABNORMAL /HPF
BILIRUB UR QL STRIP: NEGATIVE
CLARITY UR: CLEAR
COLOR UR: YELLOW
GLUCOSE UR STRIP-MCNC: NEGATIVE MG/DL
HGB UR QL STRIP.AUTO: ABNORMAL
HYALINE CASTS UR QL AUTO: ABNORMAL /LPF
KETONES UR QL STRIP: NEGATIVE
LEUKOCYTE ESTERASE UR QL STRIP.AUTO: ABNORMAL
NITRITE UR QL STRIP: NEGATIVE
PH UR STRIP.AUTO: 6 [PH] (ref 5–8)
PROT UR QL STRIP: NEGATIVE
RBC # UR STRIP: ABNORMAL /HPF
REF LAB TEST METHOD: ABNORMAL
SP GR UR STRIP: 1.01 (ref 1–1.03)
SQUAMOUS #/AREA URNS HPF: ABNORMAL /HPF
TRANS CELLS #/AREA URNS HPF: ABNORMAL /HPF
UROBILINOGEN UR QL STRIP: ABNORMAL
WBC # UR STRIP: ABNORMAL /HPF

## 2023-05-16 PROCEDURE — 99213 OFFICE O/P EST LOW 20 MIN: CPT | Performed by: NURSE PRACTITIONER

## 2023-05-16 PROCEDURE — 1159F MED LIST DOCD IN RCRD: CPT | Performed by: NURSE PRACTITIONER

## 2023-05-16 PROCEDURE — 1160F RVW MEDS BY RX/DR IN RCRD: CPT | Performed by: NURSE PRACTITIONER

## 2023-05-16 RX ORDER — TIZANIDINE 2 MG/1
2 TABLET ORAL EVERY 8 HOURS PRN
Qty: 35 TABLET | Refills: 0 | Status: SHIPPED | OUTPATIENT
Start: 2023-05-16

## 2023-05-16 RX ORDER — NITROFURANTOIN 25; 75 MG/1; MG/1
100 CAPSULE ORAL 2 TIMES DAILY
Qty: 10 CAPSULE | Refills: 0 | Status: SHIPPED | OUTPATIENT
Start: 2023-05-16 | End: 2023-05-21

## 2023-05-16 NOTE — PROGRESS NOTES
Chief Complaint  Urinary Frequency, Headache, and Cyst     Subjective:      History of Present Illness {CC  Problem List  Visit  Diagnosis   Encounters  Notes  Medications  Labs  Result Review Imaging  Media :23}     Veronica Babb presents to [facility] for:      Urinary Frequency   This is a new problem. The current episode started 1 to 4 weeks ago. The problem occurs every urination. The problem has been gradually worsening. The pain is at a severity of 0/10. The patient is experiencing no pain. There has been no fever. Associated symptoms include frequency. Pertinent negatives include no chills, discharge, flank pain, nausea or urgency. She has tried nothing for the symptoms.   Neck Pain   This is a recurrent problem. The current episode started more than 1 month ago. The problem occurs constantly. The problem has been unchanged. The pain is associated with an unknown factor. The pain is present in the left side. The quality of the pain is described as shooting. The symptoms are aggravated by twisting and position. Associated symptoms include headaches. Pertinent negatives include no leg pain, numbness, paresis or visual change. She has tried heat, acetaminophen, chiropractic manipulation, home exercises, ice and NSAIDs for the symptoms. The treatment provided no relief.      States urinary frequency x2 with strong odor and dark color at times, mostly in the morning. Denies burning, sediment or other symptoms. Denies fever, chills or abdmonial pain.     Complaint of pain to left side of neck x8 months. States it starts off as a stiff neck and feels like a knot at times. States she has taken ibuprofen and Tylenol without relief. States increased headaches with a history of migraines. Has been taking Mobic without relief. Has been doing mild exercises of the neck twice daily and applies heat. Has mid lower back pain, denies pain and tingling to arms. States most  pain is when turning head to the left and up, and lifting left arm. Daughter took her to a chiropractor without relief. Sits and lays on the couch most of the day and plays games phone. Carries heavy purse on left shoulder.     Also states hearing has decreased and would like referral for hearing test.     I have reviewed patient's medical history, any new submitted information provided by patient or medical assistant and updated medical record.      Objective:      Physical Exam  Vitals reviewed.   Constitutional:       Appearance: Normal appearance. She is well-developed.   HENT:      Right Ear: Tympanic membrane normal.      Left Ear: Tympanic membrane normal.   Neck:      Thyroid: No thyromegaly.      Comments: Pain when moving neck up and to the left. Pain with raising left arm. Sharp, shooting pain. States feels a knot in the neck at times.   Cardiovascular:      Rate and Rhythm: Normal rate and regular rhythm.      Pulses: Normal pulses.      Heart sounds: Normal heart sounds.   Pulmonary:      Effort: Pulmonary effort is normal.      Breath sounds: Normal breath sounds.      Comments: E/U   Musculoskeletal:      Cervical back: Neck supple. No signs of trauma. Pain with movement and muscular tenderness present. Decreased range of motion.   Lymphadenopathy:      Cervical: No cervical adenopathy.   Skin:     General: Skin is warm and dry.      Capillary Refill: Capillary refill takes 2 to 3 seconds.   Neurological:      Mental Status: She is alert and oriented to person, place, and time.   Psychiatric:         Behavior: Behavior normal. Behavior is cooperative.        Result Review  Data Reviewed:{ Labs  Result Review  Imaging  Med Tab  Media :23}     The following data was reviewed by: Josef Kemp III, NP-C on 05/16/2023  Common labs        4/19/2023    15:08   Common Labs   Glucose 100     BUN 14     Creatinine 0.72     Sodium 138     Potassium 4.8     Chloride 100     Calcium 10.0     WBC  "5.47     Hemoglobin 12.5     Hematocrit 37.0     Platelets 236       Lab Results - Last 18 Months   Lab Units 04/19/23  1508 03/15/22  0000   BUN mg/dL 14 10   CREATININE mg/dL 0.72 0.72   SODIUM mmol/L 138 135   POTASSIUM mmol/L 4.8 5.2   CHLORIDE mmol/L 100 98   CALCIUM mg/dL 10.0 9.4   ALBUMIN g/dL  --  4.7*   BILIRUBIN mg/dL  --  0.2   ALK PHOS IU/L  --  88   AST (SGOT) IU/L  --  18   ALT (SGPT) IU/L  --  7   WBC 10*3/mm3 5.47 CANCELED   RBC 10*6/mm3 4.05 CANCELED   HEMATOCRIT % 37.0 CANCELED   MCV fL 91.4  --    MCH pg 30.9  --      UA in office showing 1+ Leukocytes and trace blood.      Brief Urine Lab Results  (Last result in the past 365 days)      Color   Clarity   Blood   Leuk Est   Nitrite   Protein   CREAT   Urine HCG        05/16/23 1331 Yellow   Clear   Trace   Small (1+)   Negative   Negative               WBC: 13-20, bacteria: trace     Vital Signs:   /70 (BP Location: Left arm, Patient Position: Sitting, Cuff Size: Adult)   Pulse 77   Ht 160 cm (63\")   Wt 54.9 kg (121 lb)   SpO2 96%   BMI 21.43 kg/m²         Requested Prescriptions     Signed Prescriptions Disp Refills   • nitrofurantoin, macrocrystal-monohydrate, (MACROBID) 100 MG capsule 10 capsule 0     Sig: Take 1 capsule by mouth 2 (Two) Times a Day for 10 doses.   • tiZANidine (ZANAFLEX) 2 MG tablet 35 tablet 0     Sig: Take 1 tablet by mouth Every 8 (Eight) Hours As Needed for Muscle Spasms.       Routine medications provided by this office will also be refilled via pharmacy request.       Current Outpatient Medications:   •  dicyclomine (BENTYL) 20 MG tablet, TAKE 1 TABLET BY MOUTH EVERY 6 HOURS, Disp: 60 tablet, Rfl: 5  •  diphenhydrAMINE (BENADRYL) 25 mg capsule, Take 1 capsule by mouth Every 6 (Six) Hours As Needed for Itching., Disp: , Rfl:   •  lisinopril (PRINIVIL,ZESTRIL) 40 MG tablet, TAKE 1 TABLET BY MOUTH DAILY, Disp: 90 tablet, Rfl: 1  •  meloxicam (Mobic) 15 MG tablet, Take 1 tablet by mouth Daily. For neck pain. Take " with food., Disp: 30 tablet, Rfl: 2  •  SUMAtriptan (IMITREX) 100 MG tablet, TAKE 1 TABLET BY MOUTH AT ONSET OF HEADACHE. MAY REPEAT DOSE 1 TIME IN 2 HOURS IF HEADACHE NOT RELIEVED, Disp: 12 tablet, Rfl: 1  •  zolpidem (AMBIEN) 10 MG tablet, TAKE 1 TABLET BY MOUTH AT NIGHT AS NEEDED FOR SLEEP, Disp: 30 tablet, Rfl: 5  •  nitrofurantoin, macrocrystal-monohydrate, (MACROBID) 100 MG capsule, Take 1 capsule by mouth 2 (Two) Times a Day for 10 doses., Disp: 10 capsule, Rfl: 0  •  tiZANidine (ZANAFLEX) 2 MG tablet, Take 1 tablet by mouth Every 8 (Eight) Hours As Needed for Muscle Spasms., Disp: 35 tablet, Rfl: 0     Assessment and Plan:      Assessment and Plan {CC Problem List  Visit Diagnosis  ROS  Review (Popup)  Health Maintenance  Quality  BestPractice  Medications  SmartSets  SnapShot Encounters  Media :23}     Problem List Items Addressed This Visit    None  Visit Diagnoses     Frequent urination    -  Primary    Relevant Medications    nitrofurantoin, macrocrystal-monohydrate, (MACROBID) 100 MG capsule    Other Relevant Orders    Urinalysis With Microscopic If Indicated (No Culture) - Urine, Clean Catch (Completed)    Urinalysis, Microscopic Only - Urine, Clean Catch (Completed)    Muscle spasms of neck        Relevant Medications    tiZANidine (ZANAFLEX) 2 MG tablet    Decreased hearing of both ears        Relevant Orders    Ambulatory Referral to ENT (Otolaryngology) (Completed)        I discussed medication use, dosing and possible side effects.   Patient was given opportunity to ask any questions.   She's aware zanaflex can cause dizziness and not to drive while taking.   ROM exercises.  Cold compresses.   She declines PT today. She will let me know if she changes her mind.     Follow Up {Instructions Charge Capture  Follow-up Communications :23}     Return if symptoms worsen or fail to improve, for Next scheduled follow up.      Patient was given instructions and counseling regarding her  condition or for health maintenance advice. Please see specific information pulled into the AVS if appropriate.    Lorraine disclaimer:   Much of this encounter note is an electronic transcription/translation of spoken language to printed text. The electronic translation of spoken language may permit erroneous, or at times, nonsensical words or phrases to be inadvertently transcribed; Although I have reviewed the note for such errors, some may still exist.     Additional Patient Counseling:       Patient Instructions   You have been diagnosed with a urinary tract infection (UTI). An antibiotic has been prescribed for you that needs to be taken until gone, even if you feel better prior to completing the medication. It is recommended that you take a probiotic AFTER completing your antibiotic course for about 30 days; probiotics are available over the counter in pill form and can be found in certain yogurt brands. Increase you intake of water; you may also drink low sugar cranberry juice. Avoid caffeine as this tends to irritate the bladder wall.     You may take over the counter AZO for no more than 3 days for bladder pain; AZO will turn you urine very orange or red. Use Motrin or Tylenol for fever/pain if no contradictions .   For recurrent infections it is best to avoid bathing in a tub, always wipe from front to back, urinate before and after sexual intercourse, avoid tight fitting pants, and wear cotton underwear.  If you develop fever, nausea, vomiting, flank pain notify your provider.

## 2023-05-16 NOTE — ASSESSMENT & PLAN NOTE
Pain x8 weeks. Has tried heat, Tylenol, Mobic, ice and home exercises without relief.     Decline PT at this time.

## 2023-05-21 LAB
BACTERIA UR CULT: ABNORMAL
BACTERIA UR CULT: ABNORMAL
OTHER ANTIBIOTIC SUSC ISLT: ABNORMAL

## 2023-05-24 DIAGNOSIS — R35.0 FREQUENT URINATION: Primary | ICD-10-CM

## 2023-05-24 RX ORDER — CIPROFLOXACIN 250 MG/1
250 TABLET, FILM COATED ORAL 2 TIMES DAILY
Qty: 14 TABLET | Refills: 0 | Status: SHIPPED | OUTPATIENT
Start: 2023-05-24 | End: 2023-05-31

## 2023-05-25 ENCOUNTER — TELEPHONE (OUTPATIENT)
Dept: INTERNAL MEDICINE | Facility: CLINIC | Age: 88
End: 2023-05-25
Payer: MEDICARE

## 2023-05-25 DIAGNOSIS — M62.838 MUSCLE SPASMS OF NECK: ICD-10-CM

## 2023-05-25 DIAGNOSIS — K58.1 IRRITABLE BOWEL SYNDROME WITH CONSTIPATION: ICD-10-CM

## 2023-05-25 NOTE — TELEPHONE ENCOUNTER
Caller: Pharmacist Childers (Ashley)    Reason: is needing clarification about patient medication (cipro)     Summer Call Back Number  Is    [phone]

## 2023-05-26 RX ORDER — TIZANIDINE 2 MG/1
TABLET ORAL
Qty: 35 TABLET | Refills: 0 | Status: SHIPPED | OUTPATIENT
Start: 2023-05-26

## 2023-05-26 RX ORDER — DICYCLOMINE HCL 20 MG
TABLET ORAL
Qty: 60 TABLET | Refills: 5 | Status: SHIPPED | OUTPATIENT
Start: 2023-05-26

## 2023-05-26 NOTE — TELEPHONE ENCOUNTER
Rx Refill Note  Requested Prescriptions     Pending Prescriptions Disp Refills   • tiZANidine (ZANAFLEX) 2 MG tablet [Pharmacy Med Name: TIZANIDINE 2MG TABLETS] 35 tablet 0     Sig: TAKE 1 TABLET BY MOUTH EVERY 8 HOURS AS NEEDED FOR MUSCLE SPASMS   • dicyclomine (BENTYL) 20 MG tablet [Pharmacy Med Name: DICYCLOMINE 20MG TABLETS] 60 tablet 5     Sig: TAKE 1 TABLET BY MOUTH EVERY 6 HOURS      Last office visit with prescribing clinician: 5/16/2023   Last telemedicine visit with prescribing clinician: Visit date not found   Next office visit with prescribing clinician: 11/1/2023     Jessica Fernandez MA  05/26/23, 08:40 EDT

## 2023-06-15 DIAGNOSIS — G43.019 INTRACTABLE MIGRAINE WITHOUT AURA AND WITHOUT STATUS MIGRAINOSUS: ICD-10-CM

## 2023-06-15 RX ORDER — SUMATRIPTAN 100 MG/1
TABLET, FILM COATED ORAL
Qty: 12 TABLET | Refills: 1 | Status: SHIPPED | OUTPATIENT
Start: 2023-06-15

## 2023-10-24 DIAGNOSIS — G43.019 INTRACTABLE MIGRAINE WITHOUT AURA AND WITHOUT STATUS MIGRAINOSUS: ICD-10-CM

## 2023-10-24 RX ORDER — SUMATRIPTAN 100 MG/1
TABLET, FILM COATED ORAL
Qty: 12 TABLET | Refills: 1 | Status: SHIPPED | OUTPATIENT
Start: 2023-10-24

## 2023-10-27 ENCOUNTER — TELEPHONE (OUTPATIENT)
Dept: INTERNAL MEDICINE | Facility: CLINIC | Age: 88
End: 2023-10-27
Payer: MEDICARE

## 2023-10-27 NOTE — TELEPHONE ENCOUNTER
Caller: Veronica Babb    Relationship: Self    Best call back number: [phone]  (OK TO LEAVE )     What is the best time to reach you: ANY     Who are you requesting to speak with (clinical staff, provider,  specific staff member): CLINICAL     What was the call regarding: WANTS TO KNOW ABOUT GETTING A SHINGLES SHOT,     Is it okay if the provider responds through MyChart: YES

## 2023-10-31 NOTE — TELEPHONE ENCOUNTER
Ok to have shingrix: she has zostravax in the past.     Shingrix is a two shot vaccine.      KAMERONN

## 2023-11-29 DIAGNOSIS — K58.1 IRRITABLE BOWEL SYNDROME WITH CONSTIPATION: ICD-10-CM

## 2023-11-29 RX ORDER — DICYCLOMINE HCL 20 MG
TABLET ORAL
Qty: 60 TABLET | Refills: 5 | Status: SHIPPED | OUTPATIENT
Start: 2023-11-29

## 2023-11-29 NOTE — TELEPHONE ENCOUNTER
Rx Refill Note  Requested Prescriptions     Pending Prescriptions Disp Refills    dicyclomine (BENTYL) 20 MG tablet [Pharmacy Med Name: DICYCLOMINE 20MG TABLETS] 60 tablet 5     Sig: TAKE 1 TABLET BY MOUTH EVERY 6 HOURS      Last office visit with prescribing clinician: 5/16/2023   Last telemedicine visit with prescribing clinician: Visit date not found   Next office visit with prescribing clinician: Visit date not found         Sun Whitlock MA  11/29/23, 13:07 EST

## 2024-01-02 DIAGNOSIS — R11.0 NAUSEA: ICD-10-CM

## 2024-01-02 RX ORDER — PROMETHAZINE HYDROCHLORIDE 25 MG/1
TABLET ORAL
Qty: 20 TABLET | Refills: 0 | OUTPATIENT
Start: 2024-01-02

## 2024-01-15 DIAGNOSIS — F51.01 PRIMARY INSOMNIA: ICD-10-CM

## 2024-01-15 RX ORDER — ZOLPIDEM TARTRATE 10 MG/1
10 TABLET ORAL NIGHTLY PRN
Qty: 30 TABLET | Refills: 4 | Status: SHIPPED | OUTPATIENT
Start: 2024-01-15

## 2024-01-15 NOTE — TELEPHONE ENCOUNTER
Rx Refill Note  Requested Prescriptions     Pending Prescriptions Disp Refills    zolpidem (AMBIEN) 10 MG tablet [Pharmacy Med Name: ZOLPIDEM 10MG TABLETS] 30 tablet      Sig: TAKE 1 TABLET BY MOUTH AT NIGHT AS NEEDED FOR SLEEP      Last office visit with prescribing clinician: 5/16/2023  Next office visit with prescribing clinician: Visit date not found         Sun Whitlock MA  01/15/24, 09:34 EST

## 2024-01-23 ENCOUNTER — TELEPHONE (OUTPATIENT)
Dept: INTERNAL MEDICINE | Facility: CLINIC | Age: 89
End: 2024-01-23
Payer: MEDICARE

## 2024-01-23 NOTE — TELEPHONE ENCOUNTER
Caller: Veronica Babb    Relationship: Self    Best call back number: [phone]    Who are you requesting to speak with (clinical staff, provider,  specific staff member): SHELLIE LI     What was the call regarding: PATIENT STATES SHE WANTED TO LET SHELLIE LI KNOW THAT SHE WAS UNABLE TO MAKE THE APPOINTMENT YESTERDAY, 01/22/2024 DUE TO HER GOING TO THE OLD OFFICE LOCATION.     PATIENT STATES SHE WILL CALL BACK AND RESCHEDULE THE APPOINTMENT WHEN SHE IS ABLE TO COME IN.

## 2024-02-08 ENCOUNTER — TELEPHONE (OUTPATIENT)
Dept: ORTHOPEDIC SURGERY | Facility: CLINIC | Age: 89
End: 2024-02-08
Payer: MEDICARE

## 2024-02-08 NOTE — TELEPHONE ENCOUNTER
TALK TO RUSTY FREEMAN STATED PATEINT NEED TO GO TO ER CALLED PATIENT NO ANSWER THEN CALLED DAUGHTER SUDHAKAR SHE WILL TRY AND CALL EMILY GAVE MY NUMBER AND EXT TO CALL ME BACK.

## 2024-02-08 NOTE — TELEPHONE ENCOUNTER
Caller: ASHLIE    Relationship to patient: [facility]    Best call back number: PT:     Chief complaint: CLOSED DISPLACED FRACTURE OF THE LEFT FEMORAL NECK    Type of visit: NEW PROBLEM    Requested date: ASAP    Additional notes:DX NOT IN REF TOOL

## 2024-02-09 NOTE — TELEPHONE ENCOUNTER
CALLED PATIENT TALK TO SON DEZ HE WAS NOT TOLD YESTERDAY TO TAKE HER TO  ER ASAP I TALKED TO SHEFALI TODAY CONFIRMED SHE NEEDED TO GO TO  ER ASAP HE WILL MAKE SURE SHE GETS THERE.

## 2024-02-09 NOTE — TELEPHONE ENCOUNTER
"    Hub staff attempted to follow warm transfer process and was unsuccessful     Caller: CINTHIA PETER    Relationship to patient: Emergency Contact    Best call back number: [phone]    Patient is needing: DEZ CALLING TO SCHEDULE APPT FOR LEFT HIP FRACTURE. IMAGING AND PN FROM [facility] IN CHART 02/08. PER IMAGING REPORT, \"Findings suspicious subcapital left femoral neck fracture.Possible left greater trochanter fracture as well\". HUB UNABLE TO SCHEDULE WITHIN URGENT TIMEFRAME, DX NOT IN REF TOOL.        "

## 2024-02-28 ENCOUNTER — TELEPHONE (OUTPATIENT)
Dept: INTERNAL MEDICINE | Facility: CLINIC | Age: 89
End: 2024-02-28
Payer: MEDICARE

## 2024-02-28 NOTE — TELEPHONE ENCOUNTER
Caller: Veronica Babb    Relationship: Self    Best call back number: [phone]     What medication are you requesting: SOMETHING FOR PAIN    What are your current symptoms: KNOCKED DOWN  LB DOG    How long have you been experiencing symptoms: 2 WEEKS    Have you had these symptoms before:    [] Yes  [x] No    Have you been treated for these symptoms before:   [] Yes  [x] No    If a prescription is needed, what is your preferred pharmacy and phone number: [id number] [place], [place] - [address] [facility] [address] [facility] [phone] [facility] [phone]      Additional notes: PATIENT WAS KNOCKED DOWN BY A 100 LB DOG AND IS IN PAIN DUE TO HITTING THE GROUND. PATIENT HAS APPOINTMENT SCHEDULED WITH SHELLIE LI ON 03/04/24 BUT WOULD APPRECIATE SOMETHING TO HELP HER TILL THEN.     PLEASE CALL TO ADVISE

## 2024-02-28 NOTE — TELEPHONE ENCOUNTER
Hub staff attempted to follow warm transfer process and was unsuccessful     Caller: Veronica Babb    Relationship to patient: Self    Best call back number:     Patient is needing: PATIENT IS CALLING IN TO RETURN A CALL TO [place]

## 2024-03-04 ENCOUNTER — OFFICE VISIT (OUTPATIENT)
Dept: INTERNAL MEDICINE | Facility: CLINIC | Age: 89
End: 2024-03-04
Payer: MEDICARE

## 2024-03-04 VITALS
OXYGEN SATURATION: 96 % | BODY MASS INDEX: 20.91 KG/M2 | DIASTOLIC BLOOD PRESSURE: 70 MMHG | HEIGHT: 63 IN | HEART RATE: 83 BPM | SYSTOLIC BLOOD PRESSURE: 126 MMHG | WEIGHT: 118 LBS

## 2024-03-04 DIAGNOSIS — M16.12 PRIMARY OSTEOARTHRITIS OF LEFT HIP: Primary | ICD-10-CM

## 2024-03-04 DIAGNOSIS — F51.01 PRIMARY INSOMNIA: Chronic | ICD-10-CM

## 2024-03-04 PROBLEM — M16.11 PRIMARY OSTEOARTHRITIS OF RIGHT HIP: Status: ACTIVE | Noted: 2024-03-04

## 2024-03-04 PROCEDURE — 1160F RVW MEDS BY RX/DR IN RCRD: CPT | Performed by: NURSE PRACTITIONER

## 2024-03-04 PROCEDURE — 99214 OFFICE O/P EST MOD 30 MIN: CPT | Performed by: NURSE PRACTITIONER

## 2024-03-04 PROCEDURE — 1159F MED LIST DOCD IN RCRD: CPT | Performed by: NURSE PRACTITIONER

## 2024-03-04 RX ORDER — HYDROCODONE BITARTRATE AND ACETAMINOPHEN 5; 325 MG/1; MG/1
1 TABLET ORAL EVERY 12 HOURS PRN
Qty: 60 TABLET | Refills: 0 | Status: SHIPPED | OUTPATIENT
Start: 2024-03-04

## 2024-03-04 NOTE — PROGRESS NOTES
"        Chief Complaint  Hip Problem (Patient is having hip surgery on 04/22/24 and is still in a lot of pain. )     Subjective:      History of Present Illness {CC  Problem List  Visit  Diagnosis   Encounters  Notes  Medications  Labs  Result Review Imaging  Media :23}     Veronica Babb presents to [facility] for:      Hip pain:     2/8: [facility]: had fallen one week prior   She was able to get up and bear weight.   Had worsening left hip pain:   Xray Hip:     IMPRESSION:     1. Findings suspicious subcapital left femoral neck fracture. Possible left greater trochanter fracture as well. CT left hip recommended for   further evaluation.     2/15/24: follow up with ortho: Dr Whitney   Per ortho note: no fracture seen.  End stage OA.   Plans on left hip: Surgery planned for 4/22   Prior right hip     Pain has not been controlled. She is here with her son today: she has been taking IBU in addition to mobic.    She had been given norco and twice a day had helped her manage pain enough to perform adls.     No leg weakness, edema, numbness.     I have reviewed patient's medical history, any new submitted information provided by patient or medical assistant and updated medical record.      Objective:      Physical Exam  Cardiovascular:      Rate and Rhythm: Normal rate and regular rhythm.      Pulses: Normal pulses.      Heart sounds: Normal heart sounds.   Pulmonary:      Effort: Pulmonary effort is normal.      Breath sounds: Normal breath sounds.   Musculoskeletal:      Left hip: Tenderness and crepitus present. Decreased range of motion.        Result Review  Data Reviewed:{ Labs  Result Review  Imaging  Med Tab  Media :23}                Vital Signs:   /70 (BP Location: Left arm, Patient Position: Sitting, Cuff Size: Adult)   Pulse 83   Ht 160 cm (63\")   Wt 53.5 kg (118 lb)   SpO2 96%   BMI 20.90 kg/m²         Requested Prescriptions     Signed Prescriptions " Disp Refills    HYDROcodone-acetaminophen (Norco) 5-325 MG per tablet 60 tablet 0     Sig: Take 1 tablet by mouth Every 12 (Twelve) Hours As Needed for Severe Pain.    zolpidem (Ambien) 5 MG tablet       Sig: Take 1 tablet by mouth At Night As Needed for Sleep.       Routine medications provided by this office will also be refilled via pharmacy request.       Current Outpatient Medications:     dicyclomine (BENTYL) 20 MG tablet, TAKE 1 TABLET BY MOUTH EVERY 6 HOURS, Disp: 60 tablet, Rfl: 5    diphenhydrAMINE (BENADRYL) 25 mg capsule, Take 1 capsule by mouth Every 6 (Six) Hours As Needed for Itching., Disp: , Rfl:     lisinopril (PRINIVIL,ZESTRIL) 40 MG tablet, TAKE 1 TABLET BY MOUTH DAILY, Disp: 90 tablet, Rfl: 1    meloxicam (Mobic) 15 MG tablet, Take 1 tablet by mouth Daily. For neck pain. Take with food., Disp: 30 tablet, Rfl: 2    SUMAtriptan (IMITREX) 100 MG tablet, TAKE 1 TABLET BY MOUTH AT ONSET OF HEADACHE. MAY REPEAT DOSE 1 TIME IN 2 HOURS IF HEADACHE NOT RELIEVED, Disp: 12 tablet, Rfl: 1    tiZANidine (ZANAFLEX) 2 MG tablet, TAKE 1 TABLET BY MOUTH EVERY 8 HOURS AS NEEDED FOR MUSCLE SPASMS, Disp: 35 tablet, Rfl: 0    HYDROcodone-acetaminophen (Norco) 5-325 MG per tablet, Take 1 tablet by mouth Every 12 (Twelve) Hours As Needed for Severe Pain., Disp: 60 tablet, Rfl: 0    zolpidem (Ambien) 5 MG tablet, Take 1 tablet by mouth At Night As Needed for Sleep., Disp: , Rfl:      Assessment and Plan:      Assessment and Plan {CC Problem List  Visit Diagnosis  ROS  Review (Popup)  Health Maintenance  Quality  BestPractice  Medications  SmartSets  SnapShot Encounters  Media :23}     Problem List Items Addressed This Visit          Musculoskeletal and Injuries    Primary osteoarthritis of right hip - Primary (Chronic)    Current Assessment & Plan     Patient pain is not controlled with current regimen.  Will continue norco - she will take sparingly.       Continue to use lowest effective dose.       Contract signed today.          Relevant Medications    HYDROcodone-acetaminophen (Norco) 5-325 MG per tablet       Sleep    Primary insomnia (Chronic)    Current Assessment & Plan     Decrease ambien to 5 mg nightly          Relevant Medications    zolpidem (Ambien) 5 MG tablet     Advised no nsaids with mobic.   Given end stage oa and she had had GI issues and anemia with nsaids in the past - will continue low dose norco until surgery.     Discussed with son and patient.   Will decrease ambien to 5 mg nightly as needed.       Follow Up {Instructions Charge Capture  Follow-up Communications :23}     Return in about 29 days (around 4/2/2024) for surgical clearance .    They will call ortho to discussed the pre-op orders already placed.     Patient was given instructions and counseling regarding her condition or for health maintenance advice. Please see specific information pulled into the AVS if appropriate.    Lorraine disclaimer:   Much of this encounter note is an electronic transcription/translation of spoken language to printed text. The electronic translation of spoken language may permit erroneous, or at times, nonsensical words or phrases to be inadvertently transcribed; Although I have reviewed the note for such errors, some may still exist.     Additional Patient Counseling:       There are no Patient Instructions on file for this visit.

## 2024-03-05 PROBLEM — M16.11 PRIMARY OSTEOARTHRITIS OF RIGHT HIP: Chronic | Status: ACTIVE | Noted: 2024-03-04

## 2024-03-05 RX ORDER — ZOLPIDEM TARTRATE 10 MG/1
5 TABLET ORAL NIGHTLY PRN
Start: 2024-03-05 | End: 2024-03-05

## 2024-03-05 RX ORDER — ZOLPIDEM TARTRATE 5 MG/1
5 TABLET ORAL NIGHTLY PRN
Start: 2024-03-05

## 2024-03-05 NOTE — ASSESSMENT & PLAN NOTE
Patient pain is not controlled with current regimen.  Will continue norco - she will take sparingly.       Continue to use lowest effective dose.      Contract signed today.

## 2024-03-09 DIAGNOSIS — I10 ESSENTIAL HYPERTENSION: ICD-10-CM

## 2024-03-11 RX ORDER — LISINOPRIL 40 MG/1
40 TABLET ORAL DAILY
Qty: 90 TABLET | Refills: 1 | Status: SHIPPED | OUTPATIENT
Start: 2024-03-11

## 2024-04-09 ENCOUNTER — OFFICE VISIT (OUTPATIENT)
Dept: INTERNAL MEDICINE | Facility: CLINIC | Age: 89
End: 2024-04-09
Payer: MEDICARE

## 2024-04-09 VITALS
SYSTOLIC BLOOD PRESSURE: 120 MMHG | HEART RATE: 90 BPM | BODY MASS INDEX: 20.94 KG/M2 | HEIGHT: 63 IN | DIASTOLIC BLOOD PRESSURE: 64 MMHG | WEIGHT: 118.2 LBS | OXYGEN SATURATION: 98 %

## 2024-04-09 DIAGNOSIS — D64.9 ANEMIA, UNSPECIFIED TYPE: ICD-10-CM

## 2024-04-09 DIAGNOSIS — M16.12 PRIMARY OSTEOARTHRITIS OF LEFT HIP: Primary | ICD-10-CM

## 2024-04-09 DIAGNOSIS — I10 ESSENTIAL HYPERTENSION: Chronic | ICD-10-CM

## 2024-04-09 DIAGNOSIS — F51.01 PRIMARY INSOMNIA: Chronic | ICD-10-CM

## 2024-04-09 LAB
BASOPHILS # BLD AUTO: 0.03 10*3/MM3 (ref 0–0.2)
BASOPHILS NFR BLD AUTO: 0.4 % (ref 0–1.5)
EOSINOPHIL # BLD AUTO: 0.26 10*3/MM3 (ref 0–0.4)
EOSINOPHIL NFR BLD AUTO: 3.8 % (ref 0.3–6.2)
ERYTHROCYTE [DISTWIDTH] IN BLOOD BY AUTOMATED COUNT: 12.8 % (ref 12.3–15.4)
FERRITIN SERPL-MCNC: 153 NG/ML (ref 13–150)
HCT VFR BLD AUTO: 28.4 % (ref 34–46.6)
HGB BLD-MCNC: 9.2 G/DL (ref 12–15.9)
IMM GRANULOCYTES # BLD AUTO: 0.03 10*3/MM3 (ref 0–0.05)
IMM GRANULOCYTES NFR BLD AUTO: 0.4 % (ref 0–0.5)
LYMPHOCYTES # BLD AUTO: 0.35 10*3/MM3 (ref 0.7–3.1)
LYMPHOCYTES NFR BLD AUTO: 5.1 % (ref 19.6–45.3)
MCH RBC QN AUTO: 29.7 PG (ref 26.6–33)
MCHC RBC AUTO-ENTMCNC: 32.4 G/DL (ref 31.5–35.7)
MCV RBC AUTO: 91.6 FL (ref 79–97)
MONOCYTES # BLD AUTO: 0.49 10*3/MM3 (ref 0.1–0.9)
MONOCYTES NFR BLD AUTO: 7.1 % (ref 5–12)
NEUTROPHILS # BLD AUTO: 5.72 10*3/MM3 (ref 1.7–7)
NEUTROPHILS NFR BLD AUTO: 83.2 % (ref 42.7–76)
NRBC BLD AUTO-RTO: 0 /100 WBC (ref 0–0.2)
PLATELET # BLD AUTO: 217 10*3/MM3 (ref 140–450)
RBC # BLD AUTO: 3.1 10*6/MM3 (ref 3.77–5.28)
RETICS/RBC NFR AUTO: 1.12 % (ref 0.7–1.9)
TSH SERPL DL<=0.005 MIU/L-ACNC: 0.9 UIU/ML (ref 0.27–4.2)
VIT B12 SERPL-MCNC: 558 PG/ML (ref 211–946)
WBC # BLD AUTO: 6.88 10*3/MM3 (ref 3.4–10.8)

## 2024-04-09 PROCEDURE — 99214 OFFICE O/P EST MOD 30 MIN: CPT | Performed by: NURSE PRACTITIONER

## 2024-04-09 NOTE — PROGRESS NOTES
"           Name: Veronica Babb  :  1935    Subjective:      Chief Complaint   Patient presents with    Surgical Clearance           Veronica Babb is a 89 y.o. female who presents to the office today for a preoperative consultation at the request of surgeon Pedro and Migueln: Dr Whitney,  who plans on performing left anterior hip replacement on 24.     Patient does not have objections to receiving blood products if needed.    The following portions of the patient's history were reviewed and updated as appropriate: allergies, current medications, past family history, past medical history, past social history, past surgical history, and problem list.    Problems with prior anesthesia: \"never\"     She chronically has OA, hypertension, insomnia.     Functional Status:     States she can use vacuum .   Has an upstairs but doesn't go up stairs.   States all her living area is on the first floor.       I have reviewed the patient's medical history in detail and updated the computerized patient record.    Past Medical History:   Diagnosis Date    COVID-19 vaccine series completed     2021    Hypertension     IBS (irritable bowel syndrome)     Insomnia     Limited joint range of motion (ROM)     RIGHT KNEE    Migraines     Murmur     STATES WAS TOLD THIS WAS \"SLIGHT\"    Right knee pain     Weakness     RIGHT KNEE       Past Surgical History:   Procedure Laterality Date    BREAST AUGMENTATION      CATARACT EXTRACTION, BILATERAL      COLONOSCOPY  2011    HIP ARTHROPLASTY Right     HYSTERECTOMY      TOTAL KNEE ARTHROPLASTY Right 2021    Procedure: TOTAL KNEE ARTHROPLASTY;  Surgeon: Jian Hernandez MD;  Location: [facility];  Service: Orthopedics;  Laterality: Right;       Family History   Problem Relation Age of Onset    Malig Hyperthermia Neg Hx        Social History     Socioeconomic History    Marital status:    Tobacco Use    Smoking status: Former     Current " "packs/day: 0.00     Average packs/day: 0.5 packs/day for 3.0 years (1.5 ttl pk-yrs)     Types: Cigarettes     Start date:      Quit date:      Years since quittin.3     Passive exposure: Never    Smokeless tobacco: Never    Tobacco comments:     IN HER 20'S   Vaping Use    Vaping status: Never Used   Substance and Sexual Activity    Alcohol use: No    Drug use: Never    Sexual activity: Never       Most Recent Immunizations   Administered Date(s) Administered    Arexvy (RSV, Adults 60+ yrs) 01/15/2024    COVID-19 (MODERNA) 1st,2nd,3rd Dose Monovalent 2021    COVID-19 (MODERNA) BIVALENT 12+YRS 2022    COVID-19 (MODERNA) Monovalent Original Booster 2022    COVID-19 (PFIZER) Purple Cap Monovalent 2021    Flu Vaccine Intradermal Quad 18-64YR 2019    FluMist 2-49yrs 10/07/2017    Fluad Quad 65+ 10/11/2023    Fluzone High Dose =>65 Years ([employer] ONLY) 10/12/2018    Fluzone High-Dose 65+yrs 10/17/2022    Fluzone Quad >6mos (Multi-dose) 2014    Influenza, Unspecified 2010    Pneumococcal Conjugate 13-Valent (PCV13) 10/04/2016    Pneumococcal Polysaccharide (PPSV23) 2019    Pneumococcal, Unspecified 09/10/2011    Tdap 10/01/2014    Zostavax 2011             Objective:      Vital Signs:     Vitals:    24 1439   BP: 120/64   BP Location: Left arm   Patient Position: Sitting   Cuff Size: Adult   Pulse: 90   SpO2: 98%   Weight: 53.6 kg (118 lb 3.2 oz)   Height: 160 cm (63\")       Physical Exam:   Physical Exam  Vitals reviewed.   Constitutional:       Appearance: Normal appearance. She is well-developed.   Neck:      Thyroid: No thyromegaly.   Cardiovascular:      Rate and Rhythm: Normal rate and regular rhythm.      Pulses: Normal pulses.      Heart sounds: Normal heart sounds.   Pulmonary:      Effort: Pulmonary effort is normal.      Breath sounds: Normal breath sounds.      Comments: E/U   Abdominal:      General: Bowel sounds are normal. "   Lymphadenopathy:      Cervical: No cervical adenopathy.   Skin:     General: Skin is warm and dry.   Neurological:      Mental Status: She is alert and oriented to person, place, and time.   Psychiatric:         Behavior: Behavior is cooperative.           Predictors of intubation difficulty:   Morbid obesity? no   Short, thick neck? no   Neck range of motion: normal      Dentition: missing front tooth but nothing that is removable per patient.     Cardiographics  ECG:   ECG 12 Lead (06/24/2021 10:38 AM)   Atrial premature complex   No heart complaints      Lab Review   No visits with results within 6 Month(s) from this visit.   Latest known visit with results is:   Orders Only on 05/16/2023   Component Date Value    Urine Culture 05/16/2023 Final report (A)     Result 1 05/16/2023 Comment (A)     Susceptibility Testing 05/16/2023 Comment       Lab Results   Component Value Date    WBC 6.88 04/09/2024    HGB 9.2 (L) 04/09/2024    HCT 28.4 (L) 04/09/2024    MCV 91.6 04/09/2024     04/09/2024           Results Review:      REVIEWED AND DISCUSSED LAB RESULTS WITH PATIENT      Requested Prescriptions      No prescriptions requested or ordered in this encounter         Current Outpatient Medications:     dicyclomine (BENTYL) 20 MG tablet, TAKE 1 TABLET BY MOUTH EVERY 6 HOURS, Disp: 60 tablet, Rfl: 5    diphenhydrAMINE (BENADRYL) 25 mg capsule, Take 1 capsule by mouth Every 6 (Six) Hours As Needed for Itching., Disp: , Rfl:     lisinopril (PRINIVIL,ZESTRIL) 40 MG tablet, TAKE 1 TABLET BY MOUTH DAILY, Disp: 90 tablet, Rfl: 1    meloxicam (Mobic) 15 MG tablet, Take 1 tablet by mouth Daily. For neck pain. Take with food., Disp: 30 tablet, Rfl: 2    SUMAtriptan (IMITREX) 100 MG tablet, TAKE 1 TABLET BY MOUTH AT ONSET OF HEADACHE. MAY REPEAT DOSE 1 TIME IN 2 HOURS IF HEADACHE NOT RELIEVED, Disp: 12 tablet, Rfl: 1    tiZANidine (ZANAFLEX) 2 MG tablet, TAKE 1 TABLET BY MOUTH EVERY 8 HOURS AS NEEDED FOR MUSCLE SPASMS,  "Disp: 35 tablet, Rfl: 0    zolpidem (Ambien) 5 MG tablet, Take 1 tablet by mouth At Night As Needed for Sleep., Disp: , Rfl:     HYDROcodone-acetaminophen (Norco) 5-325 MG per tablet, Take 1 tablet by mouth Every 12 (Twelve) Hours As Needed for Severe Pain., Disp: 60 tablet, Rfl: 0    sucralfate (CARAFATE) 1 g tablet, Take 1 tablet by mouth 3 times a day. One hour before meals on empty stomach., Disp: 80 tablet, Rfl: 0       Assessment and Plan:        Problems Addressed this Visit          Musculoskeletal and Injuries    Primary osteoarthritis of left hip - Primary     Other Visit Diagnoses       Anemia, unspecified type        Relevant Orders    TSH Rfx On Abnormal To Free T4 (Completed)    Ferritin (Completed)    Vitamin B12 (Completed)    CBC w AUTO Differential (Completed)    Reticulocytes (Completed)          Diagnoses         Codes Comments    Primary osteoarthritis of left hip    -  Primary ICD-10-CM: M16.12  ICD-9-CM: 715.15     Anemia, unspecified type     ICD-10-CM: D64.9  ICD-9-CM: 285.9               89 y.o. female with planned surgery as above.      During her workup - hgb was down to 10.4 on 3/26/24.   She has been taking nsaids for OA pain.     Rechecked in office today: down to 9.2  She is not symptomatic.     Addendum: 4/15/24    Stool for occult blood: positive  Will require further workup.  She chart note and order.   Will get her to GI for evaluation.   Stop nsaids.   Norco for pain.     Further workup needed.     Not clear at this time for surgery.              Kashmir \"Eliazar\" Justo, ANGELIA   04/15/24    Additional Patient Counseling:       There are no Patient Instructions on file for this visit.  "

## 2024-04-11 ENCOUNTER — TELEPHONE (OUTPATIENT)
Dept: INTERNAL MEDICINE | Facility: CLINIC | Age: 89
End: 2024-04-11
Payer: MEDICARE

## 2024-04-11 NOTE — PROGRESS NOTES
Please call her:   I need her to bring a stool sample - may need to come here and get specimen cup and bring sample back.     Her hemoglobin is lower than it was 2 weeks ago.     WCN    IFOBT:  Lab Results       Component                Value               Date                       WBC                      6.88                04/09/2024                 HGB                      9.2 (L)             04/09/2024       3/26/24: 10.4            HCT                      28.4 (L)            04/09/2024                 MCV                      91.6                04/09/2024                 PLT                      217                 04/09/2024

## 2024-04-11 NOTE — TELEPHONE ENCOUNTER
Irene from 's office called to check the status of patients surgical clearance paperwork. I advised irene that patient was in the office, provider is finishing office note and looking at labs. Will fax surgical clearance form by late today or tomorrow morning.

## 2024-04-12 DIAGNOSIS — D64.9 ANEMIA, UNSPECIFIED TYPE: Primary | ICD-10-CM

## 2024-04-12 LAB — HEMOCCULT STL QL IA: POSITIVE

## 2024-04-12 PROCEDURE — 82274 ASSAY TEST FOR BLOOD FECAL: CPT | Performed by: NURSE PRACTITIONER

## 2024-04-15 ENCOUNTER — TELEPHONE (OUTPATIENT)
Dept: INTERNAL MEDICINE | Facility: CLINIC | Age: 89
End: 2024-04-15
Payer: MEDICARE

## 2024-04-15 ENCOUNTER — TELEPHONE (OUTPATIENT)
Dept: GASTROENTEROLOGY | Facility: CLINIC | Age: 89
End: 2024-04-15

## 2024-04-15 DIAGNOSIS — M16.12 PRIMARY OSTEOARTHRITIS OF LEFT HIP: ICD-10-CM

## 2024-04-15 DIAGNOSIS — R19.5 HEME POSITIVE STOOL: Primary | ICD-10-CM

## 2024-04-15 DIAGNOSIS — D64.9 ANEMIA, UNSPECIFIED TYPE: ICD-10-CM

## 2024-04-15 RX ORDER — SUCRALFATE 1 G/1
TABLET ORAL
Qty: 276 TABLET | OUTPATIENT
Start: 2024-04-15

## 2024-04-15 RX ORDER — HYDROCODONE BITARTRATE AND ACETAMINOPHEN 5; 325 MG/1; MG/1
1 TABLET ORAL EVERY 12 HOURS PRN
Qty: 60 TABLET | Refills: 0 | Status: SHIPPED | OUTPATIENT
Start: 2024-04-15

## 2024-04-15 RX ORDER — SUCRALFATE 1 G/1
1 TABLET ORAL 3 TIMES DAILY
Qty: 80 TABLET | Refills: 0 | Status: SHIPPED | OUTPATIENT
Start: 2024-04-15

## 2024-04-15 NOTE — TELEPHONE ENCOUNTER
Hub staff attempted to follow warm transfer process and was unsuccessful     Caller: ASHLIE    Relationship to patient: Provider    Best call back number: [phone]    Patient is needing: ASHLIE WITH DR. LI OFFICE CALLING BECAUSE REFERRAL WAS RETURNED TO THEM STATING WE HAD NO AVAILABLE URGENT APPTS. THEY STATED THIS PATIENT NEEDS TO BE SEEN URGENTLY BECAUSE OF DX Heme positive stool & Anemia, unspecified type. THEY WOULD LIKE OFFICE TO CONTACT THEM ABOUT THIS REFERRAL. PLEASE REVIEW AND CONTACT ASHLIE TO ADVISE.

## 2024-04-15 NOTE — TELEPHONE ENCOUNTER
Left Voice Mail for pt regarding results.HUB OKAY TO READ   ----- Message from PREM Pemberton III sent at 4/15/2024 10:23 AM EDT -----  Please call her - her stool showed some blood.   She will need to wait for her surgery for now.   She needs to stop her mobic/ meloxicam.  She needs to avoid nsaids: ibuprofen, aleve, motrin.     I will need her to see GI. I will place a referral and they will contact her.     In the mean time: I am sending in Carafate.     You should take it 3 times a day.     You should take it on an empty stomach: about one hour before a meal.  This helps to ensure optimal effectiveness.     Avoid taking other medications within 2 hours before or after Carafate, as it may interfere with their absorption.     If she gets dark or bloody stool, weakness, chest pain, shortness of breath: she should go to the ER.     Because she can not take NSAID - I will send in norco for her pain for now.   She can also continue to take tylenol as needed.     She needs follow up in office on 4/25 or 4/26.     WCN

## 2024-04-15 NOTE — TELEPHONE ENCOUNTER
Name: CINTHIA PETER      Relationship: Emergency Contact      Best Callback Number: [phone]      HUB PROVIDED THE RELAY MESSAGE FROM THE OFFICE      PATIENT: PATIENT HAS BEEN WAITING EIGHT WEEKS FOR A HIP REPLACEMENT. PATIENT'S SON WOULD LIKE TO KNOW IF THIS SURGERY WOULD NEED TO BE PUSHED OFF. PLEASE ADVISE, PATIENT IS A LOT OF PAIN. PLEASE CALL PATIENT'S SON (ON VERBAL) TO DISCUSS. PATIENT HAS AN APPOINTMENT TOMORROW TO DISCUSS THE SURGERY, AND SURGERY IS SCHEDULED ON 04/22.

## 2024-04-15 NOTE — PROGRESS NOTES
Please call her - her stool showed some blood.   She will need to wait for her surgery for now.   She needs to stop her mobic/ meloxicam.  She needs to avoid nsaids: ibuprofen, aleve, motrin.     I will need her to see GI. I will place a referral and they will contact her.     In the mean time: I am sending in Carafate.     You should take it 3 times a day.     You should take it on an empty stomach: about one hour before a meal.  This helps to ensure optimal effectiveness.     Avoid taking other medications within 2 hours before or after Carafate, as it may interfere with their absorption.     If she gets dark or bloody stool, weakness, chest pain, shortness of breath: she should go to the ER.     Because she can not take NSAID - I will send in norco for her pain for now.   She can also continue to take tylenol as needed.     She needs follow up in office on 4/25 or 4/26.     LUDIN

## 2024-04-15 NOTE — TELEPHONE ENCOUNTER
Patient son advised of results, will call back to schedule follow up on 04/25/ or 4/26. BRENDEN [place] TO READ

## 2024-04-16 ENCOUNTER — OFFICE VISIT (OUTPATIENT)
Dept: GASTROENTEROLOGY | Facility: CLINIC | Age: 89
End: 2024-04-16
Payer: MEDICARE

## 2024-04-16 ENCOUNTER — PREP FOR SURGERY (OUTPATIENT)
Dept: SURGERY | Facility: SURGERY CENTER | Age: 89
End: 2024-04-16
Payer: MEDICARE

## 2024-04-16 VITALS
BODY MASS INDEX: 20.14 KG/M2 | TEMPERATURE: 98.2 F | HEART RATE: 84 BPM | DIASTOLIC BLOOD PRESSURE: 102 MMHG | WEIGHT: 113.7 LBS | SYSTOLIC BLOOD PRESSURE: 148 MMHG | OXYGEN SATURATION: 98 % | HEIGHT: 63 IN

## 2024-04-16 DIAGNOSIS — Z79.1 NSAID LONG-TERM USE: ICD-10-CM

## 2024-04-16 DIAGNOSIS — D64.9 ANEMIA, UNSPECIFIED TYPE: ICD-10-CM

## 2024-04-16 DIAGNOSIS — K21.00 GASTROESOPHAGEAL REFLUX DISEASE WITH ESOPHAGITIS WITHOUT HEMORRHAGE: Primary | Chronic | ICD-10-CM

## 2024-04-16 DIAGNOSIS — K58.1 IRRITABLE BOWEL SYNDROME WITH CONSTIPATION: Chronic | ICD-10-CM

## 2024-04-16 DIAGNOSIS — R19.5 HEME POSITIVE STOOL: ICD-10-CM

## 2024-04-16 DIAGNOSIS — K21.00 GASTROESOPHAGEAL REFLUX DISEASE WITH ESOPHAGITIS WITHOUT HEMORRHAGE: Primary | ICD-10-CM

## 2024-04-16 PROCEDURE — 1159F MED LIST DOCD IN RCRD: CPT

## 2024-04-16 PROCEDURE — 1160F RVW MEDS BY RX/DR IN RCRD: CPT

## 2024-04-16 PROCEDURE — 99214 OFFICE O/P EST MOD 30 MIN: CPT

## 2024-04-16 RX ORDER — SODIUM CHLORIDE, SODIUM LACTATE, POTASSIUM CHLORIDE, CALCIUM CHLORIDE 600; 310; 30; 20 MG/100ML; MG/100ML; MG/100ML; MG/100ML
30 INJECTION, SOLUTION INTRAVENOUS CONTINUOUS PRN
OUTPATIENT
Start: 2024-04-16

## 2024-04-16 RX ORDER — SODIUM CHLORIDE 0.9 % (FLUSH) 0.9 %
3 SYRINGE (ML) INJECTION EVERY 12 HOURS SCHEDULED
OUTPATIENT
Start: 2024-04-16

## 2024-04-16 RX ORDER — PANTOPRAZOLE SODIUM 40 MG/1
40 TABLET, DELAYED RELEASE ORAL DAILY
Qty: 90 TABLET | Refills: 3 | Status: SHIPPED | OUTPATIENT
Start: 2024-04-16

## 2024-04-16 RX ORDER — ASPIRIN 325 MG
TABLET, DELAYED RELEASE (ENTERIC COATED) ORAL
COMMUNITY
Start: 2024-04-08

## 2024-04-16 RX ORDER — SODIUM CHLORIDE 0.9 % (FLUSH) 0.9 %
10 SYRINGE (ML) INJECTION AS NEEDED
OUTPATIENT
Start: 2024-04-16

## 2024-04-16 NOTE — PROGRESS NOTES
"Chief Complaint   Patient presents with    Anemia           History of Present Illness    Patient is a 89 y.o. who presents to the office as a new patient for further evaluation after she was noted to be anemic with heme positive stools during outpatient testing with primary care provider Dr. Kemp.  Patient has a significant past medical history of GERD, IBS,    Tentative plans to proceed with left anterior hip replacement on 4/22/2024 with orthopedic surgeon Dr. Stephen Whitney.    Most recent colonoscopy was performed with  In January 2011.  Denies prior EGD evaluation.    She denies upper GI concerns including heartburn, nausea, vomiting, or dysphagia.  States that up until yesterday she had been taking meloxicam 15 mg once daily for arthralgias however was instructed to stop after anemia and heme positive stool was noted on preprocedural evaluation with orthopedic surgeon.    Denies abdominal pain.  She has experienced a 5 pound weight loss over the past few weeks secondary to decreased appetite which she attributes to decreased activity from arthralgias.    Bowel habits are described as occurring regularly without evidence of melena or hematochezia.      Patient denies known family history of colon polyps, colon cancer, or IBD.       Result Review :       CBC w AUTO Differential (04/09/2024 15:28) - Hgb 9.2       Vital Signs:   BP (!) 148/102   Pulse 84   Temp 98.2 °F (36.8 °C)   Ht 160 cm (63\")   Wt 51.6 kg (113 lb 11.2 oz)   SpO2 98%   BMI 20.14 kg/m²     Body mass index is 20.14 kg/m².     Physical Exam  Vitals reviewed.   Constitutional:       General: She is not in acute distress.     Appearance: Normal appearance. She is well-developed. She is not diaphoretic.   HENT:      Head: Normocephalic and atraumatic.   Eyes:      General: No scleral icterus.  Cardiovascular:      Rate and Rhythm: Normal rate and regular rhythm.   Pulmonary:      Effort: Pulmonary effort is normal. No respiratory distress. "      Breath sounds: Normal breath sounds.   Abdominal:      General: Bowel sounds are normal. There is no distension.      Palpations: Abdomen is soft. There is no mass.      Tenderness: There is no abdominal tenderness. There is no guarding or rebound.      Hernia: No hernia is present.   Skin:     General: Skin is warm and dry.      Coloration: Skin is not jaundiced.   Neurological:      Mental Status: She is alert and oriented to person, place, and time.   Psychiatric:         Mood and Affect: Mood normal.         Behavior: Behavior normal.         Thought Content: Thought content normal.         Judgment: Judgment normal.           Assessment and Plan    Diagnoses and all orders for this visit:    1. Gastroesophageal reflux disease with esophagitis without hemorrhage (Primary)  -     pantoprazole (PROTONIX) 40 MG EC tablet; Take 1 tablet by mouth Daily.  Dispense: 90 tablet; Refill: 3    2. Irritable bowel syndrome with constipation    3. NSAID long-term use  -     pantoprazole (PROTONIX) 40 MG EC tablet; Take 1 tablet by mouth Daily.  Dispense: 90 tablet; Refill: 3           Discussion:    Patient is a pleasant 89-year-old female who presents today for further evaluation of heme positive stool testing and anemia.  Discussed recommendations to proceed with EGD and colonoscopy evaluation to assess for GI source to heme positive stool as well as anemia.     Informed patient and son of suspicion of possible gastritis or ulceration contributing to anemia secondary to use of meloxicam and this can be assessed for at time of EGD evaluation.  While awaiting bidirectional endoscopy she will begin pantoprazole 40 mg once daily.  She will follow-up in our office 4 to 6 weeks after undergoing endoscopic evaluation with Further recommendations to be made pending results of the above work-up and clinical course.    Patient is agreeable to the outlined above treatment plan.  Verbalizes understanding and will contact office  for any new or worsening concerns.  All questions answered and support provided.        Patient Instructions   Schedule EGD and colonoscopy, orders placed     Begin taking Pantoprazole 40 mg once daily prior to breakfast         EMR Dragon/Transcription Disclaimer:  This document has been Dictated utilizing Dragon dictation.

## 2024-04-16 NOTE — PATIENT INSTRUCTIONS
Schedule EGD and colonoscopy, orders placed     Begin taking Pantoprazole 40 mg once daily prior to breakfast

## 2024-04-19 ENCOUNTER — TELEPHONE (OUTPATIENT)
Dept: GASTROENTEROLOGY | Facility: CLINIC | Age: 89
End: 2024-04-19
Payer: MEDICARE

## 2024-04-19 NOTE — TELEPHONE ENCOUNTER
"HUB STAFF ATTEMPTED TO FOLLOW WARM TRANSFER PROCESS BUT WAS UNSUCCESSFUL.     Caller: CINTHIA PETER \"DEZ\"    Relationship: Emergency Contact    Best call back number: [phone]    What is the best time to reach you: ANYTIME     Who are you requesting to speak with (clinical staff, provider,  specific staff member): SCHEDULING     Do you know the name of the person who called: ZAINAB COE     What was the call regarding: SCHEDULING GASTRO PROCEDURE         "

## 2024-04-20 DIAGNOSIS — G43.019 INTRACTABLE MIGRAINE WITHOUT AURA AND WITHOUT STATUS MIGRAINOSUS: ICD-10-CM

## 2024-04-22 ENCOUNTER — PREP FOR SURGERY (OUTPATIENT)
Dept: OTHER | Facility: HOSPITAL | Age: 89
End: 2024-04-22
Payer: MEDICARE

## 2024-04-22 DIAGNOSIS — K21.00 GASTROESOPHAGEAL REFLUX DISEASE WITH ESOPHAGITIS, UNSPECIFIED WHETHER HEMORRHAGE: Primary | Chronic | ICD-10-CM

## 2024-04-22 DIAGNOSIS — D64.9 ANEMIA, UNSPECIFIED TYPE: ICD-10-CM

## 2024-04-22 DIAGNOSIS — Z79.1 ENCOUNTER FOR LONG-TERM (CURRENT) USE OF NON-STEROIDAL ANTI-INFLAMMATORIES: ICD-10-CM

## 2024-04-22 DIAGNOSIS — Z12.11 ENCOUNTER FOR SCREENING FOR MALIGNANT NEOPLASM OF COLON: ICD-10-CM

## 2024-04-22 DIAGNOSIS — R19.5 HEME POSITIVE STOOL: ICD-10-CM

## 2024-04-22 RX ORDER — SUMATRIPTAN 100 MG/1
TABLET, FILM COATED ORAL
Qty: 12 TABLET | Refills: 1 | OUTPATIENT
Start: 2024-04-22

## 2024-04-24 DIAGNOSIS — M62.838 MUSCLE SPASMS OF NECK: ICD-10-CM

## 2024-04-24 RX ORDER — TIZANIDINE 2 MG/1
TABLET ORAL
Qty: 35 TABLET | Refills: 0 | Status: SHIPPED | OUTPATIENT
Start: 2024-04-24

## 2024-04-25 ENCOUNTER — TELEPHONE (OUTPATIENT)
Dept: INTERNAL MEDICINE | Facility: CLINIC | Age: 89
End: 2024-04-25
Payer: MEDICARE

## 2024-04-25 DIAGNOSIS — G43.019 INTRACTABLE MIGRAINE WITHOUT AURA AND WITHOUT STATUS MIGRAINOSUS: ICD-10-CM

## 2024-04-25 PROBLEM — Z12.11 ENCOUNTER FOR SCREENING FOR MALIGNANT NEOPLASM OF COLON: Status: ACTIVE | Noted: 2024-04-22

## 2024-04-25 PROBLEM — Z79.1 ENCOUNTER FOR LONG-TERM (CURRENT) USE OF NON-STEROIDAL ANTI-INFLAMMATORIES: Status: ACTIVE | Noted: 2024-04-22

## 2024-04-25 NOTE — TELEPHONE ENCOUNTER
Caller: Veronica Babb    Relationship: Self    Best call back number: [phone]    Requested Prescriptions:   Requested Prescriptions     Pending Prescriptions Disp Refills    SUMAtriptan (IMITREX) 100 MG tablet [Pharmacy Med Name: SUMATRIPTAN 100MG TABLETS] 12 tablet 1     Sig: TAKE 1 TABLET BY MOUTH AT ONSET OF HEADACHE. MAY REPEAT DOSE 1 TIME IN 2 HOURS IF HEADACHE NOT RELIEVED        Pharmacy where request should be sent: [facility] #[id number] [address] AT [facility] [address] [facility] [phone] [facility] [phone]      Last office visit with prescribing clinician: 4/9/2024   Last telemedicine visit with prescribing clinician: Visit date not found   Next office visit with prescribing clinician: 4/25/2024     Additional details provided by patient: PATIENT IS OUT OF MEDICATION, HAS HAD MIGRAINE FOR 2 DAYS.   PLEASE SEND PRESCRIPTION ASAP     Does the patient have less than a 3 day supply:  [x] Yes  [] No    Evita Chávez Rep   04/25/24 15:56 EDT

## 2024-04-26 RX ORDER — SUMATRIPTAN 100 MG/1
TABLET, FILM COATED ORAL
Qty: 12 TABLET | Refills: 0 | Status: SHIPPED | OUTPATIENT
Start: 2024-04-26

## 2024-04-26 RX ORDER — SUMATRIPTAN 100 MG/1
TABLET, FILM COATED ORAL
Qty: 12 TABLET | Refills: 1 | OUTPATIENT
Start: 2024-04-26

## 2024-05-01 ENCOUNTER — TELEPHONE (OUTPATIENT)
Dept: GASTROENTEROLOGY | Facility: CLINIC | Age: 89
End: 2024-05-01

## 2024-05-01 NOTE — TELEPHONE ENCOUNTER
"        Hub staff attempted to follow warm transfer process and was unsuccessful     Caller: CINTHIA PETER \"DEZ\"    Relationship to patient: Emergency Contact    Best call back number: [phone]     Patient is needing: PT'S SON IS CALLING TO CANCEL PT'S SCOPE FOR TOMORROW WITH DR. AVALOS. PT IS EXTREMELY SICK AND SON WILL BE TAKING HER TO URGENT CARE. PLEASE CALL SON TO CONFIRM SCOPE HAS BEEN CANCELLED.          "

## 2024-05-20 RX ORDER — SUCRALFATE 1 G/1
TABLET ORAL
Qty: 80 TABLET | Refills: 0 | OUTPATIENT
Start: 2024-05-20

## 2024-06-05 DIAGNOSIS — F51.01 PRIMARY INSOMNIA: ICD-10-CM

## 2024-06-05 RX ORDER — ZOLPIDEM TARTRATE 10 MG/1
10 TABLET ORAL NIGHTLY PRN
Qty: 30 TABLET | OUTPATIENT
Start: 2024-06-05

## 2024-06-13 DIAGNOSIS — F51.01 PRIMARY INSOMNIA: Chronic | ICD-10-CM

## 2024-06-13 NOTE — TELEPHONE ENCOUNTER
Caller: Veronica Babb    Relationship: Self    Best call back number:     [phone] (Mobile)       Requested Prescriptions:   Requested Prescriptions     Pending Prescriptions Disp Refills    zolpidem (Ambien) 5 MG tablet       Sig: Take 1 tablet by mouth At Night As Needed for Sleep.        Pharmacy where request should be sent: [facility] #[id number] [facility] [address] [facility] [phone] [facility] [phone]      Last office visit with prescribing clinician: 4/9/2024   Last telemedicine visit with prescribing clinician: Visit date not found   Next office visit with prescribing clinician: 6/18/2024     Additional details provided by patient: COMPLETELY OUT     Does the patient have less than a 3 day supply:  [x] Yes  [] No    Would you like a call back once the refill request has been completed: [x] Yes [] No    If the office needs to give you a call back, can they leave a voicemail: [x] Yes [] No    Evita Singletary Rep   06/13/24 14:19 EDT

## 2024-06-14 DIAGNOSIS — F51.01 PRIMARY INSOMNIA: Chronic | ICD-10-CM

## 2024-06-14 RX ORDER — ZOLPIDEM TARTRATE 5 MG/1
5 TABLET ORAL NIGHTLY PRN
Qty: 30 TABLET | Refills: 0 | Status: SHIPPED | OUTPATIENT
Start: 2024-06-14

## 2024-06-14 RX ORDER — ZOLPIDEM TARTRATE 5 MG/1
5 TABLET ORAL NIGHTLY PRN
Start: 2024-06-14 | End: 2024-06-14 | Stop reason: SDUPTHER

## 2024-06-14 NOTE — TELEPHONE ENCOUNTER
Signed - please call her.   She was to have colonoscopy.  They cancelled due to her being sick and I can't see it is rescheduled.

## 2024-06-14 NOTE — TELEPHONE ENCOUNTER
Spoke to patient son deepak and advised patient needed to reschedule her colonoscopy. Deepak said that patient wasn't going to reschedule.

## 2024-06-18 ENCOUNTER — OFFICE VISIT (OUTPATIENT)
Dept: INTERNAL MEDICINE | Facility: CLINIC | Age: 89
End: 2024-06-18
Payer: MEDICARE

## 2024-06-18 VITALS
HEIGHT: 63 IN | WEIGHT: 112.6 LBS | HEART RATE: 89 BPM | SYSTOLIC BLOOD PRESSURE: 132 MMHG | DIASTOLIC BLOOD PRESSURE: 82 MMHG | OXYGEN SATURATION: 98 % | BODY MASS INDEX: 19.95 KG/M2

## 2024-06-18 DIAGNOSIS — K58.1 IRRITABLE BOWEL SYNDROME WITH CONSTIPATION: ICD-10-CM

## 2024-06-18 DIAGNOSIS — F51.01 PRIMARY INSOMNIA: Chronic | ICD-10-CM

## 2024-06-18 DIAGNOSIS — I10 ESSENTIAL HYPERTENSION: Chronic | ICD-10-CM

## 2024-06-18 DIAGNOSIS — D64.9 ANEMIA, UNSPECIFIED TYPE: Primary | ICD-10-CM

## 2024-06-18 DIAGNOSIS — G43.019 INTRACTABLE MIGRAINE WITHOUT AURA AND WITHOUT STATUS MIGRAINOSUS: Chronic | ICD-10-CM

## 2024-06-18 PROCEDURE — 99214 OFFICE O/P EST MOD 30 MIN: CPT | Performed by: NURSE PRACTITIONER

## 2024-06-18 PROCEDURE — 1159F MED LIST DOCD IN RCRD: CPT | Performed by: NURSE PRACTITIONER

## 2024-06-18 PROCEDURE — G2211 COMPLEX E/M VISIT ADD ON: HCPCS | Performed by: NURSE PRACTITIONER

## 2024-06-18 PROCEDURE — 1160F RVW MEDS BY RX/DR IN RCRD: CPT | Performed by: NURSE PRACTITIONER

## 2024-06-18 PROCEDURE — 1126F AMNT PAIN NOTED NONE PRSNT: CPT | Performed by: NURSE PRACTITIONER

## 2024-06-18 RX ORDER — DICYCLOMINE HCL 20 MG
20 TABLET ORAL EVERY 6 HOURS
Qty: 60 TABLET | Refills: 5 | Status: SHIPPED | OUTPATIENT
Start: 2024-06-18

## 2024-06-18 NOTE — PATIENT INSTRUCTIONS
Current Outpatient Medications:     IBS     dicyclomine (BENTYL) 20 MG tablet, Take 1 tablet by mouth Every 6 (Six) Hours AS NEEDED for stomach cramps.       lisinopril (PRINIVIL,ZESTRIL) 40 MG tablet, TAKE 1 TABLET BY MOUTH DAILY for blood pressure        pantoprazole (PROTONIX) 40 MG EC tablet, Take 1 tablet by mouth Daily.  Take on empty stomach every morning.  To protect stomach.   (VERIFY YOU HAVE THIS MEDICATION)         SUMAtriptan (IMITREX) 100 MG tablet, TAKE 1 TABLET BY MOUTH AT ONSET OF HEADACHE. MAY REPEAT DOSE 1 TIME IN 2 HOURS IF HEADACHE NOT RELIEVED.  For migraines.       zolpidem (Ambien) 5 MG tablet, Take 1 tablet by mouth At Night As Needed for Sleep. For insomnia      AVOID NSAIDs    NSAIDs: ibuprofen, naproxen (Aleve), Mortrin, Advil

## 2024-06-19 LAB
BUN SERPL-MCNC: 13 MG/DL (ref 8–23)
BUN/CREAT SERPL: 15.7 (ref 7–25)
CALCIUM SERPL-MCNC: 9.9 MG/DL (ref 8.6–10.5)
CHLORIDE SERPL-SCNC: 95 MMOL/L (ref 98–107)
CO2 SERPL-SCNC: 28.7 MMOL/L (ref 22–29)
CREAT SERPL-MCNC: 0.83 MG/DL (ref 0.57–1)
EGFRCR SERPLBLD CKD-EPI 2021: 68.3 ML/MIN/1.73
ERYTHROCYTE [DISTWIDTH] IN BLOOD BY AUTOMATED COUNT: 12.8 % (ref 12.3–15.4)
GLUCOSE SERPL-MCNC: 82 MG/DL (ref 65–99)
HCT VFR BLD AUTO: 36.5 % (ref 34–46.6)
HGB BLD-MCNC: 12 G/DL (ref 12–15.9)
MCH RBC QN AUTO: 29.6 PG (ref 26.6–33)
MCHC RBC AUTO-ENTMCNC: 32.9 G/DL (ref 31.5–35.7)
MCV RBC AUTO: 90.1 FL (ref 79–97)
PLATELET # BLD AUTO: 266 10*3/MM3 (ref 140–450)
POTASSIUM SERPL-SCNC: 4.7 MMOL/L (ref 3.5–5.2)
RBC # BLD AUTO: 4.05 10*6/MM3 (ref 3.77–5.28)
SODIUM SERPL-SCNC: 135 MMOL/L (ref 136–145)
WBC # BLD AUTO: 6.18 10*3/MM3 (ref 3.4–10.8)

## 2024-06-19 NOTE — ASSESSMENT & PLAN NOTE
Asyptomatic  Declines workup for source - will recheck labs today.   Advised against nsaids     NSAIDs: ibuprofen, naproxen (Aleve), Mortrin, Advil

## 2024-06-19 NOTE — ASSESSMENT & PLAN NOTE
Hypertension is improving with treatment.  Continue current treatment regimen.  Blood pressure will be reassessed at the next regular appointment.  Continue lisinopril

## 2024-06-19 NOTE — PROGRESS NOTES
Chief Complaint  Migraine (Med refill )     Subjective:      History of Present Illness {CC  Problem List  Visit  Diagnosis   Encounters  Notes  Medications  Labs  Result Review Imaging  Media :23}     Veronica Babb presents to [facility] for:      1) migraines: recurrent  Rarely gets one but imitrex is effective when needed.     2) hypertension: chronic and continues lisinopril   No CP, SOA    Hip pain/ OA: LV patient was here for surgical clearance.   In work up, found to be anemic and had +IFOBT.   Surgery on hold - then referred to GI and advised scope which was scheduled but patient cancelled.    My staff contacted son and was told did not plan to rescheduled and today patient states she doesn't plan to have scope nor hip surgery.   Denies dark or bloody stools.   She was told not to take any nsaids: seems she has continued but only once a day. She was started on protonix but states she doesn't think taking it.    No abd pain, soa, dizziness.     IBS: bentyl when needed is effective and needs refill.     I have reviewed patient's medical history, any new submitted information provided by patient or medical assistant and updated medical record.      Objective:      Physical Exam  Eyes:      Conjunctiva/sclera: Conjunctivae normal.   Cardiovascular:      Rate and Rhythm: Normal rate and regular rhythm.      Pulses: Normal pulses.      Heart sounds: Normal heart sounds.   Pulmonary:      Effort: Pulmonary effort is normal.      Breath sounds: Normal breath sounds.   Neurological:      Mental Status: She is alert and oriented to person, place, and time.        Result Review  Data Reviewed:{ Labs  Result Review  Imaging  Med Tab  Media :23}     The following data was reviewed by: Josef Kemp III, NP-C on 06/18/2024  Common labs          3/26/2024    11:24 4/9/2024    15:28 6/18/2024    12:45   Common Labs   Glucose   82    BUN   13    Creatinine   " 0.83    Sodium   135    Potassium   4.7    Chloride   95    Calcium   9.9    WBC 4.30     6.88  6.18    Hemoglobin 10.4     9.2  12.0    Hematocrit 33.3     28.4  36.5    Platelets 314     217  266       Details          This result is from an external source.                    Vital Signs:   /82 (BP Location: Left arm, Patient Position: Sitting, Cuff Size: Adult)   Pulse 89   Ht 160 cm (63\")   Wt 51.1 kg (112 lb 9.6 oz)   SpO2 98%   BMI 19.95 kg/m²   Estimated body mass index is 19.95 kg/m² as calculated from the following:    Height as of this encounter: 160 cm (63\").    Weight as of this encounter: 51.1 kg (112 lb 9.6 oz).        Requested Prescriptions     Signed Prescriptions Disp Refills    dicyclomine (BENTYL) 20 MG tablet 60 tablet 5     Sig: Take 1 tablet by mouth Every 6 (Six) Hours.       Routine medications provided by this office will also be refilled via pharmacy request.       Current Outpatient Medications:     dicyclomine (BENTYL) 20 MG tablet, Take 1 tablet by mouth Every 6 (Six) Hours., Disp: 60 tablet, Rfl: 5    lisinopril (PRINIVIL,ZESTRIL) 40 MG tablet, TAKE 1 TABLET BY MOUTH DAILY, Disp: 90 tablet, Rfl: 1    pantoprazole (PROTONIX) 40 MG EC tablet, Take 1 tablet by mouth Daily., Disp: 90 tablet, Rfl: 3    SUMAtriptan (IMITREX) 100 MG tablet, TAKE 1 TABLET BY MOUTH AT ONSET OF HEADACHE. MAY REPEAT DOSE 1 TIME IN 2 HOURS IF HEADACHE NOT RELIEVED, Disp: 12 tablet, Rfl: 0    zolpidem (Ambien) 5 MG tablet, Take 1 tablet by mouth At Night As Needed for Sleep., Disp: 30 tablet, Rfl: 0     Assessment and Plan:      Assessment and Plan {CC Problem List  Visit Diagnosis  ROS  Review (Popup)  Health Maintenance  Quality  BestPractice  Medications  SmartSets  SnapShot Encounters  Media :23}     Diagnoses and all orders for this visit:    1. Anemia, unspecified type (Primary)  Assessment & Plan:  Asyptomatic  Declines workup for source - will recheck labs today.   Advised against " nsaids     NSAIDs: ibuprofen, naproxen (Aleve), Mortrin, Advil      Orders:  -     CBC (No Diff)  -     Basic Metabolic Panel    2. Primary insomnia  Overview:  Continues ambien as needed.       3. Irritable bowel syndrome with constipation  Overview:  3/15/2022: states she is also taking align     Orders:  -     dicyclomine (BENTYL) 20 MG tablet; Take 1 tablet by mouth Every 6 (Six) Hours.  Dispense: 60 tablet; Refill: 5    4. Essential hypertension  Overview:  Prior treatment:  norvasc     Assessment & Plan:  Hypertension is improving with treatment.  Continue current treatment regimen.  Blood pressure will be reassessed at the next regular appointment.  Continue lisinopril       5. Intractable migraine without aura and without status migrainosus  Assessment & Plan:  Headaches are stable on current rx.                    New Medications Ordered This Visit   Medications    dicyclomine (BENTYL) 20 MG tablet     Sig: Take 1 tablet by mouth Every 6 (Six) Hours.     Dispense:  60 tablet     Refill:  5     I made a detailed list of medications and printed and reviewed with her to verify when she gets home and let me know if she doesn't have any medications on list.   States she will consider GI workup if hgb not improved.   Continue protonix.     Advised ER for SOA, CP       Follow Up {Instructions Charge Capture  Follow-up Communications :23}     Return in about 6 months (around 12/18/2024) for Medicare Wellness.      Patient was given instructions and counseling regarding her condition or for health maintenance advice. Please see specific information pulled into the AVS if appropriate.    Myrtleon disclaimer:   Much of this encounter note is an electronic transcription/translation of spoken language to printed text. The electronic translation of spoken language may permit erroneous, or at times, nonsensical words or phrases to be inadvertently transcribed; Although I have reviewed the note for such errors, some may  still exist.     Additional Patient Counseling:       Patient Instructions     Current Outpatient Medications:     IBS     dicyclomine (BENTYL) 20 MG tablet, Take 1 tablet by mouth Every 6 (Six) Hours AS NEEDED for stomach cramps.       lisinopril (PRINIVIL,ZESTRIL) 40 MG tablet, TAKE 1 TABLET BY MOUTH DAILY for blood pressure        pantoprazole (PROTONIX) 40 MG EC tablet, Take 1 tablet by mouth Daily.  Take on empty stomach every morning.  To protect stomach.   (VERIFY YOU HAVE THIS MEDICATION)         SUMAtriptan (IMITREX) 100 MG tablet, TAKE 1 TABLET BY MOUTH AT ONSET OF HEADACHE. MAY REPEAT DOSE 1 TIME IN 2 HOURS IF HEADACHE NOT RELIEVED.  For migraines.       zolpidem (Ambien) 5 MG tablet, Take 1 tablet by mouth At Night As Needed for Sleep. For insomnia      AVOID NSAIDs    NSAIDs: ibuprofen, naproxen (Aleve), Mortrin, Advil

## 2024-06-20 ENCOUNTER — TELEPHONE (OUTPATIENT)
Dept: INTERNAL MEDICINE | Facility: CLINIC | Age: 89
End: 2024-06-20
Payer: MEDICARE

## 2024-06-26 DIAGNOSIS — G43.019 INTRACTABLE MIGRAINE WITHOUT AURA AND WITHOUT STATUS MIGRAINOSUS: ICD-10-CM

## 2024-06-26 DIAGNOSIS — M54.2 NECK PAIN: ICD-10-CM

## 2024-06-26 RX ORDER — SUMATRIPTAN 100 MG/1
TABLET, FILM COATED ORAL
Qty: 12 TABLET | Refills: 0 | Status: SHIPPED | OUTPATIENT
Start: 2024-06-26

## 2024-06-26 RX ORDER — MELOXICAM 15 MG/1
TABLET ORAL
Qty: 30 TABLET | Refills: 2 | OUTPATIENT
Start: 2024-06-26

## 2024-07-15 DIAGNOSIS — F51.01 PRIMARY INSOMNIA: Chronic | ICD-10-CM

## 2024-07-16 RX ORDER — ZOLPIDEM TARTRATE 5 MG/1
5 TABLET ORAL NIGHTLY PRN
Qty: 30 TABLET | Refills: 2 | Status: SHIPPED | OUTPATIENT
Start: 2024-07-16

## 2024-07-16 NOTE — TELEPHONE ENCOUNTER
Rx Refill Note  Requested Prescriptions     Pending Prescriptions Disp Refills    zolpidem (AMBIEN) 5 MG tablet [Pharmacy Med Name: ZOLPIDEM 5MG TABLETS] 30 tablet      Sig: TAKE 1 TABLET BY MOUTH AT NIGHT AS NEEDED FOR SLEEP      Last office visit with prescribing clinician: 6/18/2024  Next office visit with prescribing clinician: 12/17/2024         Sun Whitlock MA  07/16/24, 07:47 EDT

## 2024-09-05 DIAGNOSIS — I10 ESSENTIAL HYPERTENSION: ICD-10-CM

## 2024-09-05 RX ORDER — LISINOPRIL 40 MG/1
40 TABLET ORAL DAILY
Qty: 90 TABLET | Refills: 1 | Status: SHIPPED | OUTPATIENT
Start: 2024-09-05

## 2024-09-24 DIAGNOSIS — G43.019 INTRACTABLE MIGRAINE WITHOUT AURA AND WITHOUT STATUS MIGRAINOSUS: ICD-10-CM

## 2024-09-25 RX ORDER — SUMATRIPTAN 100 MG/1
TABLET, FILM COATED ORAL
Qty: 12 TABLET | Refills: 0 | Status: SHIPPED | OUTPATIENT
Start: 2024-09-25

## 2024-10-09 ENCOUNTER — OFFICE VISIT (OUTPATIENT)
Dept: INTERNAL MEDICINE | Facility: CLINIC | Age: 89
End: 2024-10-09
Payer: MEDICARE

## 2024-10-09 VITALS
SYSTOLIC BLOOD PRESSURE: 130 MMHG | WEIGHT: 110.2 LBS | DIASTOLIC BLOOD PRESSURE: 72 MMHG | OXYGEN SATURATION: 100 % | HEART RATE: 86 BPM | HEIGHT: 63 IN | BODY MASS INDEX: 19.53 KG/M2

## 2024-10-09 DIAGNOSIS — D64.9 ANEMIA, UNSPECIFIED TYPE: ICD-10-CM

## 2024-10-09 DIAGNOSIS — F51.01 PRIMARY INSOMNIA: Chronic | ICD-10-CM

## 2024-10-09 DIAGNOSIS — M16.11 PRIMARY OSTEOARTHRITIS OF RIGHT HIP: Primary | Chronic | ICD-10-CM

## 2024-10-09 PROCEDURE — 1160F RVW MEDS BY RX/DR IN RCRD: CPT | Performed by: NURSE PRACTITIONER

## 2024-10-09 PROCEDURE — 1125F AMNT PAIN NOTED PAIN PRSNT: CPT | Performed by: NURSE PRACTITIONER

## 2024-10-09 PROCEDURE — 1159F MED LIST DOCD IN RCRD: CPT | Performed by: NURSE PRACTITIONER

## 2024-10-09 PROCEDURE — 99214 OFFICE O/P EST MOD 30 MIN: CPT | Performed by: NURSE PRACTITIONER

## 2024-10-09 RX ORDER — CELECOXIB 200 MG/1
200 CAPSULE ORAL DAILY
Qty: 30 CAPSULE | Refills: 1 | Status: SHIPPED | OUTPATIENT
Start: 2024-10-09

## 2024-10-09 RX ORDER — ZOLPIDEM TARTRATE 10 MG/1
10 TABLET ORAL NIGHTLY PRN
Qty: 30 TABLET | Refills: 1 | Status: SHIPPED | OUTPATIENT
Start: 2024-10-09

## 2024-10-09 NOTE — PROGRESS NOTES
Chief Complaint  Insomnia and Neck Pain (Left side )     Subjective:      History of Present Illness {CC  Problem List  Visit  Diagnosis   Encounters  Notes  Medications  Labs  Result Review Imaging  Media :23}     Veronica Babb presents to [facility] for:      OA: multiple sites: neck, hip, knees. Chronic     Left hip: severe OA   XR Hip 2-3 Vws LT (02/08/2024 12:41 PM)     Over summer: prior note:   Hip pain/ OA: LV patient was here for surgical clearance.   In work up, found to be anemic and had +IFOBT.   Surgery on hold - then referred to GI and advised scope which was scheduled but patient cancelled.    My staff contacted son and was told did not plan to rescheduled and today patient states she doesn't plan to have scope nor hip surgery.   Denies dark or bloody stools.   She was told not to take any nsaids: seems she has continued but only once a day. She was started on protonix but states she doesn't think taking it.    No abd pain, soa, dizziness.         6/18/2024: hgb had improved to 12 (up from 9.2)      Today: states she is back to taking 4-5 IBU.  No dark or bloody stools.   No abd pain.     States with pain, ambien is not helping her sleep.     I have reviewed patient's medical history, any new submitted information provided by patient or medical assistant and updated medical record.      Objective:      Physical Exam  Cardiovascular:      Rate and Rhythm: Normal rate.      Pulses: Normal pulses.      Heart sounds: Normal heart sounds.   Pulmonary:      Effort: Pulmonary effort is normal.      Breath sounds: Normal breath sounds.   Musculoskeletal:      Left hip: Crepitus present. Decreased range of motion.   Neurological:      Gait: Gait abnormal (antalgic).        Result Review  Data Reviewed:{ Labs  Result Review  Imaging  Med Tab  Media :23}                Vital Signs:   /72 (BP Location: Left arm, Patient Position: Sitting, Cuff Size:  "Adult)   Pulse 86   Ht 160 cm (63\")   Wt 50 kg (110 lb 3.2 oz)   SpO2 100%   BMI 19.52 kg/m²   Estimated body mass index is 19.52 kg/m² as calculated from the following:    Height as of this encounter: 160 cm (63\").    Weight as of this encounter: 50 kg (110 lb 3.2 oz).        Requested Prescriptions     Signed Prescriptions Disp Refills    celecoxib (CeleBREX) 200 MG capsule 30 capsule 1     Sig: Take 1 capsule by mouth Daily.    zolpidem (AMBIEN) 10 MG tablet 30 tablet 1     Sig: Take 1 tablet by mouth At Night As Needed for Sleep.       Routine medications provided by this office will also be refilled via pharmacy request.       Current Outpatient Medications:     dicyclomine (BENTYL) 20 MG tablet, Take 1 tablet by mouth Every 6 (Six) Hours., Disp: 60 tablet, Rfl: 5    lisinopril (PRINIVIL,ZESTRIL) 40 MG tablet, TAKE 1 TABLET BY MOUTH DAILY, Disp: 90 tablet, Rfl: 1    pantoprazole (PROTONIX) 40 MG EC tablet, Take 1 tablet by mouth Daily., Disp: 90 tablet, Rfl: 3    SUMAtriptan (IMITREX) 100 MG tablet, TAKE 1 TABLET BY MOUTH AT ONSET OF HEADACHE. MAY REPEAT DOSE 1 TIME IN 2 HOURS IF HEADACHE NOT RELIEVED, Disp: 12 tablet, Rfl: 0    celecoxib (CeleBREX) 200 MG capsule, Take 1 capsule by mouth Daily., Disp: 30 capsule, Rfl: 1    zolpidem (AMBIEN) 10 MG tablet, Take 1 tablet by mouth At Night As Needed for Sleep., Disp: 30 tablet, Rfl: 1     Assessment and Plan:      Assessment and Plan {CC Problem List  Visit Diagnosis  ROS  Review (Popup)  [facility] Maintenance  Quality  BestPractice  Medications  SmartSets  SnapShot Encounters  Media :23}     Diagnoses and all orders for this visit:    1. Primary osteoarthritis of right hip (Primary)  -     Cancel: Ambulatory Referral to Orthopedic Surgery  -     celecoxib (CeleBREX) 200 MG capsule; Take 1 capsule by mouth Daily.  Dispense: 30 capsule; Refill: 1  -     Ambulatory Referral to Orthopedic Surgery    2. Primary insomnia  Overview:  Continues ambien as " needed.     Orders:  -     zolpidem (AMBIEN) 10 MG tablet; Take 1 tablet by mouth At Night As Needed for Sleep.  Dispense: 30 tablet; Refill: 1    3. Anemia, unspecified type  -     CBC (No Diff)  -     Basic Metabolic Panel      Will check her labs to ensure hgb stable.   Start celebrex to see if can help manage OA - she is aware not to take any other nsaids.     Will increase ambien temporarily - will go back to 5 mg and she is aware.     Will refer to Dr Hernandez - states he did her right knee (2021).   Plans for left total hip.          New Medications Ordered This Visit   Medications    celecoxib (CeleBREX) 200 MG capsule     Sig: Take 1 capsule by mouth Daily.     Dispense:  30 capsule     Refill:  1    zolpidem (AMBIEN) 10 MG tablet     Sig: Take 1 tablet by mouth At Night As Needed for Sleep.     Dispense:  30 tablet     Refill:  1           Follow Up {Instructions Charge Capture  Follow-up Communications :23}     Return if symptoms worsen or fail to improve, for Next scheduled follow up.      Patient was given instructions and counseling regarding her condition or for health maintenance advice. Please see specific information pulled into the AVS if appropriate.    Dragon disclaimer:   Much of this encounter note is an electronic transcription/translation of spoken language to printed text. The electronic translation of spoken language may permit erroneous, or at times, nonsensical words or phrases to be inadvertently transcribed; Although I have reviewed the note for such errors, some may still exist.     Additional Patient Counseling:       There are no Patient Instructions on file for this visit.

## 2024-10-10 LAB
BUN SERPL-MCNC: 16 MG/DL (ref 8–23)
BUN/CREAT SERPL: 18.2 (ref 7–25)
CALCIUM SERPL-MCNC: 9.8 MG/DL (ref 8.6–10.5)
CHLORIDE SERPL-SCNC: 98 MMOL/L (ref 98–107)
CO2 SERPL-SCNC: 23.8 MMOL/L (ref 22–29)
CREAT SERPL-MCNC: 0.88 MG/DL (ref 0.57–1)
EGFRCR SERPLBLD CKD-EPI 2021: 63.7 ML/MIN/1.73
ERYTHROCYTE [DISTWIDTH] IN BLOOD BY AUTOMATED COUNT: 13.2 % (ref 12.3–15.4)
GLUCOSE SERPL-MCNC: 83 MG/DL (ref 65–99)
HCT VFR BLD AUTO: 39.2 % (ref 34–46.6)
HGB BLD-MCNC: 12.7 G/DL (ref 12–15.9)
MCH RBC QN AUTO: 30 PG (ref 26.6–33)
MCHC RBC AUTO-ENTMCNC: 32.4 G/DL (ref 31.5–35.7)
MCV RBC AUTO: 92.5 FL (ref 79–97)
PLATELET # BLD AUTO: 241 10*3/MM3 (ref 140–450)
POTASSIUM SERPL-SCNC: 4.6 MMOL/L (ref 3.5–5.2)
RBC # BLD AUTO: 4.24 10*6/MM3 (ref 3.77–5.28)
SODIUM SERPL-SCNC: 135 MMOL/L (ref 136–145)
WBC # BLD AUTO: 4.87 10*3/MM3 (ref 3.4–10.8)

## 2024-11-18 DIAGNOSIS — G43.019 INTRACTABLE MIGRAINE WITHOUT AURA AND WITHOUT STATUS MIGRAINOSUS: ICD-10-CM

## 2024-11-18 RX ORDER — SUMATRIPTAN 100 MG/1
TABLET, FILM COATED ORAL
Qty: 12 TABLET | Refills: 0 | Status: SHIPPED | OUTPATIENT
Start: 2024-11-18

## 2024-11-24 DIAGNOSIS — K58.1 IRRITABLE BOWEL SYNDROME WITH CONSTIPATION: ICD-10-CM

## 2024-11-25 RX ORDER — DICYCLOMINE HCL 20 MG
20 TABLET ORAL EVERY 6 HOURS
Qty: 60 TABLET | Refills: 5 | Status: SHIPPED | OUTPATIENT
Start: 2024-11-25

## 2024-12-10 ENCOUNTER — TELEPHONE (OUTPATIENT)
Dept: INTERNAL MEDICINE | Facility: CLINIC | Age: 89
End: 2024-12-10

## 2024-12-10 DIAGNOSIS — F51.01 PRIMARY INSOMNIA: Primary | ICD-10-CM

## 2024-12-10 RX ORDER — ZOLPIDEM TARTRATE 5 MG/1
5 TABLET ORAL NIGHTLY PRN
Qty: 30 TABLET | Refills: 2 | Status: SHIPPED | OUTPATIENT
Start: 2024-12-10

## 2024-12-10 NOTE — TELEPHONE ENCOUNTER
Caller: Veronica Babb    Relationship: Self    Best call back number:     [phone]       Requested Prescriptions: zolpidem (AMBIEN) 5 MG tablet   Requested Prescriptions      No prescriptions requested or ordered in this encounter        Pharmacy where request should be sent:[facility] #[id number] [place], [place] - [address] AT [facility] - [phone] [facility] [phone]  [phone]       Last office visit with prescribing clinician: 10/9/2024   Last telemedicine visit with prescribing clinician: Visit date not found   Next office visit with prescribing clinician: 12/17/2024     Additional details provided by patient: PATIENT IS CALLING TO REQUEST A REFILL, BUT STATES NEEDS TO BE THE 5 MG INSTEAD OF THE 10 MG.    Does the patient have less than a 3 day supply:  [x] Yes  [] No    Would you like a call back once the refill request has been completed: [] Yes [] No    If the office needs to give you a call back, can they leave a voicemail: [] Yes [] No    Radha Wilson, Evita Rep   12/10/24 14:10 EST     PLEASE ADVISE.

## 2025-01-02 DIAGNOSIS — M16.11 PRIMARY OSTEOARTHRITIS OF RIGHT HIP: Chronic | ICD-10-CM

## 2025-01-07 DIAGNOSIS — F51.01 PRIMARY INSOMNIA: Chronic | ICD-10-CM

## 2025-01-08 RX ORDER — ZOLPIDEM TARTRATE 5 MG/1
5 TABLET ORAL NIGHTLY PRN
Qty: 30 TABLET | OUTPATIENT
Start: 2025-01-08

## 2025-01-08 RX ORDER — ZOLPIDEM TARTRATE 10 MG/1
10 TABLET ORAL NIGHTLY PRN
Qty: 30 TABLET | OUTPATIENT
Start: 2025-01-08

## 2025-01-09 NOTE — TELEPHONE ENCOUNTER
Rx Refill Note  Requested Prescriptions     Pending Prescriptions Disp Refills    celecoxib (CeleBREX) 200 MG capsule [Pharmacy Med Name: CELECOXIB 200MG CAPSULES] 30 capsule 1     Sig: TAKE 1 CAPSULE BY MOUTH DAILY      Last office visit with prescribing clinician: 10/9/2024  Next office visit with prescribing clinician: 1/7/2025         Sun Whitlock MA  01/09/25, 14:55 EST

## 2025-01-10 RX ORDER — CELECOXIB 200 MG/1
200 CAPSULE ORAL DAILY
Qty: 30 CAPSULE | Refills: 1 | Status: SHIPPED | OUTPATIENT
Start: 2025-01-10

## 2025-02-24 NOTE — H&P
Patient: Veronica Babb    Date of Admission: 07/07/2021    YOB: 1935    Medical Record Number: [id number]    Admitting Physician: Dr. Jian Hernandez    Reason for Admission: End Stage Right Knee OA    History of Present Illness: 86 y.o. female presents with severe end stage knee osteoarthritis which has not been responsive to the full compliment of conservative measures. Despite conservative attempts, there is still severe, constant activity limiting pain. Given the severity of the pain, the patient has elected to proceed with knee replacement.    Allergies: No Known Allergies      Current Medications:  Home Medications:    Current Outpatient Medications on File Prior to Visit   Medication Sig   • amLODIPine (NORVASC) 10 MG tablet TAKE 1 TABLET BY MOUTH DAILY   • Chlorhexidine Gluconate 2 % pads Apply  topically. AS DIRECTED PAT   • dicyclomine (BENTYL) 20 MG tablet TAKE 1 TABLET BY MOUTH EVERY 6 HOURS (Patient taking differently: Take 20 mg by mouth Every 6 (Six) Hours As Needed.)   • diphenhydrAMINE (BENADRYL) 25 mg capsule Take 25 mg by mouth Every 6 (Six) Hours As Needed for Itching.   • escitalopram (LEXAPRO) 20 MG tablet TAKE 1 TABLET BY MOUTH DAILY   • lisinopril (PRINIVIL,ZESTRIL) 40 MG tablet TAKE 1 TABLET BY MOUTH DAILY   • mupirocin (BACTROBAN) 2 % ointment Apply  topically to the appropriate area as directed 3 (Three) Times a Day. AS DIRECTED PAT   • promethazine (PHENERGAN) 25 MG tablet TAKE 1/2 TABLET BY MOUTH EVERY 8 HOURS AS NEEDED FOR NAUSEA OR VOMITING (Patient taking differently: 25 mg Every 6 (Six) Hours As Needed. MIGRAINES)   • SUMAtriptan (IMITREX) 100 MG tablet Take one tablet at onset of headache. May repeat dose one time in 2 hours if headache not relieved. (Patient taking differently: Take 100 mg by mouth 1 (One) Time As Needed. Take one tablet at onset of headache. May repeat dose one time in 2 hours if headache not relieved.)   • zolpidem (AMBIEN) 10 MG tablet TAKE 1 TABLET  RN ED Mental Health Handoff Note    72 hour hold    Does patient require 1:1? Yes    Hold and rights been given and documented for patient: Yes    Is the patient in BH scrubs? Yes    Has the patient been searched? Yes    Is the 15 minute observation tool up to date? Yes    Was patient issued a welcome folder? Yes    Room check completed this shift: Yes    PSS3 and Andrews Assessment/Reassessment this shift:    C-SSRS (Andrews)      Date and Time Q1 Wished to be Dead (Past Month) Q2 Suicidal Thoughts (Past Month) Q3 Suicidal Thought Method Q4 Suicidal Intent without Specific Plan Q5 Suicide Intent with Specific Plan Q6 Suicide Behavior (Lifetime) If yes to Q6, within past 3 months? Level of Risk per Screen Level of Risk per Screen User   02/22/25 1641 1-->yes 1-->yes 1-->yes 0-->no 1-->yes 1-->yes 1-->yes -- high risk KRK   02/22/25 1547 1-->yes 1-->yes 1-->yes 0-->no 1-->yes 0-->no -- -- high risk LLM            Behavioral status of patient: Green    Code 21 called this shift? No    Use of restraints/seclusion this shift? No    Most recent vital signs:  Temp: 97  F (36.1  C) Temp src: Oral BP: (!) 173/107 Pulse: 84   Resp: 20 SpO2: 94 % O2 Device: None (Room air)      Medications:  Scheduled medication compliance? Yes    PRN Meds administered this shift? Yes    Medications   OLANZapine zydis (zyPREXA) ODT tab 5-10 mg (has no administration in time range)     Or   haloperidol lactate (HALDOL) injection 2.5-5 mg (has no administration in time range)   flumazenil (ROMAZICON) injection 0.2 mg (has no administration in time range)   melatonin tablet 5 mg (has no administration in time range)   diazepam (VALIUM) tablet 10 mg (0 mg Oral Return to Cabinet 2/23/25 0000)     Or   diazepam (VALIUM) injection 5-10 mg ( Intravenous See Alternative 2/23/25 0000)   Warfarin Dose Required Daily - Pharmacist Managed (has no administration in time range)   lisinopril (ZESTRIL) tablet 20 mg (20 mg Oral $Given 2/23/25 2948)    buPROPion (WELLBUTRIN XL) 24 hr tablet 150 mg (150 mg Oral $Given 2/23/25 0717)   carvedilol (COREG) tablet 12.5 mg (12.5 mg Oral $Given 2/23/25 1806)   acetaminophen (TYLENOL) tablet 1,000 mg (1,000 mg Oral $Given 2/23/25 2018)   hydrALAZINE (APRESOLINE) tablet 10 mg (10 mg Oral $Given 2/23/25 2143)   cloNIDine (CATAPRES) tablet 0.1 mg (has no administration in time range)   metFORMIN (GLUCOPHAGE XR) 24 hr tablet 1,000 mg (has no administration in time range)   sodium chloride 0.9% BOLUS 1,000 mL (0 mLs Intravenous Stopped 2/22/25 1906)   warfarin ANTICOAGULANT (COUMADIN) tablet 5 mg (5 mg Oral $Given 2/23/25 0211)   warfarin ANTICOAGULANT (COUMADIN) tablet 5 mg (5 mg Oral $Given 2/23/25 1938)         ADLs    Meal Provided this shift? Yes    Hygiene items provided? Yes    ADLs completed? Yes    Date of last shower: unknown, nut shower cap given to pt    Any significant events this shift? No    Any information that would be helpful in caring for this patient?  N/a    Family present/updated? Yes    Location of patient's belongings: old Dec office    Critical Care Minutes:  Does the patient need critical care minutes documented? No                     "BY MOUTH AT NIGHT AS NEEDED FOR SLEEP (Patient taking differently: Take 10 mg by mouth Every Night.)     Current Facility-Administered Medications on File Prior to Visit   Medication   • Chlorhexidine Gluconate Cloth 2 % pads     PRN Meds:.    PMH:     Past Medical History:   Diagnosis Date   • COVID-19 vaccine series completed     MARCH 8, 2021   • Hypertension    • IBS (irritable bowel syndrome)    • Insomnia    • Limited joint range of motion (ROM)     RIGHT KNEE   • Migraines    • Murmur     STATES WAS TOLD THIS WAS \"SLIGHT\"   • Right knee pain    • Weakness     RIGHT KNEE       PF/Surg/Soc Hx:     Past Surgical History:   Procedure Laterality Date   • BREAST AUGMENTATION     • CATARACT EXTRACTION, BILATERAL     • COLONOSCOPY  01/19/2011   • HIP ARTHROPLASTY Right    • HYSTERECTOMY          Social History     Occupational History   • Not on file   Tobacco Use   • Smoking status: Former Smoker     Packs/day: 0.25     Years: 5.00     Pack years: 1.25   • Smokeless tobacco: Never Used   • Tobacco comment: IN HER 20'S   Vaping Use   • Vaping Use: Never used   Substance and Sexual Activity   • Alcohol use: No   • Drug use: Never   • Sexual activity: Never      Social History     Social History Narrative   • Not on file        Family History   Problem Relation Age of Onset   • [place] Hyperthermia Neg Hx          Review of Systems:   A 14 point review of systems was performed, pertinent positives discussed above, all other systems are negative    Physical Exam: 86 y.o. female  Vital Signs :   Vitals:    07/02/21 1056   Temp: 97.5 °F (36.4 °C)   Weight: 58.1 kg (128 lb)   Height: 160 cm (63\")   PainSc:   7     General: Alert and Oriented x 3, No acute distress.  Psych: mood and affect appropriate; recent and remote memory intact  Eyes: conjunctiva clear; pupils equally round and reactive, sclera nonicteric  CV: no peripheral edema  Resp: normal respiratory effort  Skin: no rashes or wounds; normal " turgor  Musculosketetal; pain and crepitance with knee range of motion  Vascular: palpable distal pulses    Xrays:  -3 views (AP, lateral, and sunrise) were reviewed demonstrating end-stage OA with bone on bone articulation.  -A full length AP xray was ordered today for purposes of operative alignment demonstrating end stage arthritic findings. There are no previous full length films for review    Assessment:  End-stage Right knee osteoarthritis. Conservative measures have failed.      Plan:  The plan is to proceed with Right Total Knee Replacement. The patient voiced understanding of the risks, benefits, and alternative forms of treatment that were discussed with Dr Hernandez at the time of scheduling. 1-2 hospital stay with home health PT.    Sixto Ga, APRN  7/2/2021

## 2025-02-26 DIAGNOSIS — G43.019 INTRACTABLE MIGRAINE WITHOUT AURA AND WITHOUT STATUS MIGRAINOSUS: ICD-10-CM

## 2025-02-26 RX ORDER — SUMATRIPTAN SUCCINATE 100 MG/1
TABLET ORAL
Qty: 12 TABLET | Refills: 0 | Status: SHIPPED | OUTPATIENT
Start: 2025-02-26

## 2025-02-26 NOTE — TELEPHONE ENCOUNTER
Rx Refill Note  Requested Prescriptions     Pending Prescriptions Disp Refills    SUMAtriptan (IMITREX) 100 MG tablet [Pharmacy Med Name: SUMATRIPTAN 100MG TABLETS] 12 tablet 0     Sig: TAKE 1 TABLET BY MOUTH AT ONSET OF HEADACHE. MAY REPEAT DOSE 1 TIME IN 2 HOURS IF HEADACHE NOT RELIEVED      Last office visit with prescribing clinician: 10/9/2024  Next office visit with prescribing clinician: 7/8/2025         Sun Whitlock MA  02/26/25, 15:55 EST

## 2025-03-06 DIAGNOSIS — I10 ESSENTIAL HYPERTENSION: ICD-10-CM

## 2025-03-06 RX ORDER — LISINOPRIL 40 MG/1
40 TABLET ORAL DAILY
Qty: 90 TABLET | Refills: 1 | Status: SHIPPED | OUTPATIENT
Start: 2025-03-06

## 2025-03-10 DIAGNOSIS — F51.01 PRIMARY INSOMNIA: ICD-10-CM

## 2025-03-10 RX ORDER — ZOLPIDEM TARTRATE 5 MG/1
5 TABLET ORAL NIGHTLY PRN
Qty: 30 TABLET | Refills: 2 | Status: SHIPPED | OUTPATIENT
Start: 2025-03-10

## 2025-03-10 NOTE — TELEPHONE ENCOUNTER
Rx Refill Note  Requested Prescriptions     Pending Prescriptions Disp Refills    zolpidem (AMBIEN) 5 MG tablet [Pharmacy Med Name: ZOLPIDEM 5MG TABLETS] 30 tablet      Sig: TAKE 1 TABLET BY MOUTH AT NIGHT AS NEEDED FOR SLEEP      Last office visit with prescribing clinician: 10/9/2024  Next office visit with prescribing clinician: 7/8/2025         Sun Whitlock MA  03/10/25, 11:12 EDT

## 2025-05-07 ENCOUNTER — HOSPITAL ENCOUNTER (OUTPATIENT)
Facility: HOSPITAL | Age: 89
Setting detail: OBSERVATION
Discharge: HOME | End: 2025-05-08
Attending: STUDENT IN AN ORGANIZED HEALTH CARE EDUCATION/TRAINING PROGRAM | Admitting: EMERGENCY MEDICINE
Payer: MEDICARE

## 2025-05-07 ENCOUNTER — APPOINTMENT (OUTPATIENT)
Dept: CT IMAGING | Facility: HOSPITAL | Age: 89
End: 2025-05-07
Payer: MEDICARE

## 2025-05-07 ENCOUNTER — APPOINTMENT (OUTPATIENT)
Dept: GENERAL RADIOLOGY | Facility: HOSPITAL | Age: 89
End: 2025-05-07
Payer: MEDICARE

## 2025-05-07 DIAGNOSIS — H54.61 VISION LOSS, RIGHT EYE: ICD-10-CM

## 2025-05-07 DIAGNOSIS — Z78.9 DECREASED ACTIVITIES OF DAILY LIVING (ADL): ICD-10-CM

## 2025-05-07 DIAGNOSIS — I10 UNCONTROLLED HYPERTENSION: ICD-10-CM

## 2025-05-07 DIAGNOSIS — M17.9 OSTEOARTHRITIS OF KNEE, UNSPECIFIED LATERALITY, UNSPECIFIED OSTEOARTHRITIS TYPE: ICD-10-CM

## 2025-05-07 DIAGNOSIS — M50.90 CERVICAL DISC DISEASE: ICD-10-CM

## 2025-05-07 DIAGNOSIS — H34.8112 CENTRAL RETINAL VEIN OCCLUSION OF RIGHT EYE, UNSPECIFIED COMPLICATION STATUS: Primary | ICD-10-CM

## 2025-05-07 DIAGNOSIS — R91.8 PULMONARY MASS: ICD-10-CM

## 2025-05-07 LAB
ALBUMIN SERPL-MCNC: 3.8 G/DL (ref 3.5–5.2)
ALBUMIN/GLOB SERPL: 1.2 G/DL
ALP SERPL-CCNC: 82 U/L (ref 39–117)
ALT SERPL W P-5'-P-CCNC: 5 U/L (ref 1–33)
ANION GAP SERPL CALCULATED.3IONS-SCNC: 10.7 MMOL/L (ref 5–15)
APTT PPP: 35 SECONDS (ref 22.7–35.4)
AST SERPL-CCNC: 15 U/L (ref 1–32)
BASOPHILS # BLD AUTO: 0.03 10*3/MM3 (ref 0–0.2)
BASOPHILS NFR BLD AUTO: 0.5 % (ref 0–1.5)
BILIRUB SERPL-MCNC: 0.3 MG/DL (ref 0–1.2)
BUN SERPL-MCNC: 19 MG/DL (ref 8–23)
BUN/CREAT SERPL: 25 (ref 7–25)
CALCIUM SPEC-SCNC: 9.1 MG/DL (ref 8.6–10.5)
CHLORIDE SERPL-SCNC: 101 MMOL/L (ref 98–107)
CO2 SERPL-SCNC: 23.3 MMOL/L (ref 22–29)
CREAT SERPL-MCNC: 0.76 MG/DL (ref 0.57–1)
DEPRECATED RDW RBC AUTO: 41.4 FL (ref 37–54)
EGFRCR SERPLBLD CKD-EPI 2021: 75.5 ML/MIN/1.73
EOSINOPHIL # BLD AUTO: 0.19 10*3/MM3 (ref 0–0.4)
EOSINOPHIL NFR BLD AUTO: 3.5 % (ref 0.3–6.2)
ERYTHROCYTE [DISTWIDTH] IN BLOOD BY AUTOMATED COUNT: 13 % (ref 12.3–15.4)
GEN 5 1HR TROPONIN T REFLEX: 25 NG/L
GLOBULIN UR ELPH-MCNC: 3.2 GM/DL
GLUCOSE BLDC GLUCOMTR-MCNC: 111 MG/DL (ref 70–130)
GLUCOSE SERPL-MCNC: 83 MG/DL (ref 65–99)
HCT VFR BLD AUTO: 33.4 % (ref 34–46.6)
HGB BLD-MCNC: 11 G/DL (ref 12–15.9)
HOLD SPECIMEN: NORMAL
IMM GRANULOCYTES # BLD AUTO: 0.02 10*3/MM3 (ref 0–0.05)
IMM GRANULOCYTES NFR BLD AUTO: 0.4 % (ref 0–0.5)
INR PPP: 1.02 (ref 0.9–1.1)
LYMPHOCYTES # BLD AUTO: 0.51 10*3/MM3 (ref 0.7–3.1)
LYMPHOCYTES NFR BLD AUTO: 9.3 % (ref 19.6–45.3)
MCH RBC QN AUTO: 29.2 PG (ref 26.6–33)
MCHC RBC AUTO-ENTMCNC: 32.9 G/DL (ref 31.5–35.7)
MCV RBC AUTO: 88.6 FL (ref 79–97)
MONOCYTES # BLD AUTO: 0.65 10*3/MM3 (ref 0.1–0.9)
MONOCYTES NFR BLD AUTO: 11.8 % (ref 5–12)
NEUTROPHILS NFR BLD AUTO: 4.09 10*3/MM3 (ref 1.7–7)
NEUTROPHILS NFR BLD AUTO: 74.5 % (ref 42.7–76)
NRBC BLD AUTO-RTO: 0 /100 WBC (ref 0–0.2)
PLATELET # BLD AUTO: 245 10*3/MM3 (ref 140–450)
PMV BLD AUTO: 10.4 FL (ref 6–12)
POTASSIUM SERPL-SCNC: 4.3 MMOL/L (ref 3.5–5.2)
PROT SERPL-MCNC: 7 G/DL (ref 6–8.5)
PROTHROMBIN TIME: 13.3 SECONDS (ref 11.7–14.2)
RBC # BLD AUTO: 3.77 10*6/MM3 (ref 3.77–5.28)
SODIUM SERPL-SCNC: 135 MMOL/L (ref 136–145)
TROPONIN T % DELTA: -7
TROPONIN T NUMERIC DELTA: -2 NG/L
TROPONIN T SERPL HS-MCNC: 27 NG/L
WBC NRBC COR # BLD AUTO: 5.49 10*3/MM3 (ref 3.4–10.8)
WHOLE BLOOD HOLD COAG: NORMAL
WHOLE BLOOD HOLD SPECIMEN: NORMAL

## 2025-05-07 PROCEDURE — 99204 OFFICE O/P NEW MOD 45 MIN: CPT

## 2025-05-07 PROCEDURE — 81003 URINALYSIS AUTO W/O SCOPE: CPT | Performed by: PHYSICIAN ASSISTANT

## 2025-05-07 PROCEDURE — G0378 HOSPITAL OBSERVATION PER HR: HCPCS

## 2025-05-07 PROCEDURE — 85025 COMPLETE CBC W/AUTO DIFF WBC: CPT | Performed by: STUDENT IN AN ORGANIZED HEALTH CARE EDUCATION/TRAINING PROGRAM

## 2025-05-07 PROCEDURE — 93005 ELECTROCARDIOGRAM TRACING: CPT | Performed by: STUDENT IN AN ORGANIZED HEALTH CARE EDUCATION/TRAINING PROGRAM

## 2025-05-07 PROCEDURE — 70498 CT ANGIOGRAPHY NECK: CPT

## 2025-05-07 PROCEDURE — 85730 THROMBOPLASTIN TIME PARTIAL: CPT | Performed by: STUDENT IN AN ORGANIZED HEALTH CARE EDUCATION/TRAINING PROGRAM

## 2025-05-07 PROCEDURE — 80053 COMPREHEN METABOLIC PANEL: CPT | Performed by: STUDENT IN AN ORGANIZED HEALTH CARE EDUCATION/TRAINING PROGRAM

## 2025-05-07 PROCEDURE — 82948 REAGENT STRIP/BLOOD GLUCOSE: CPT

## 2025-05-07 PROCEDURE — 99285 EMERGENCY DEPT VISIT HI MDM: CPT

## 2025-05-07 PROCEDURE — 93010 ELECTROCARDIOGRAM REPORT: CPT | Performed by: INTERNAL MEDICINE

## 2025-05-07 PROCEDURE — 84484 ASSAY OF TROPONIN QUANT: CPT | Performed by: STUDENT IN AN ORGANIZED HEALTH CARE EDUCATION/TRAINING PROGRAM

## 2025-05-07 PROCEDURE — 25510000001 IOPAMIDOL PER 1 ML: Performed by: STUDENT IN AN ORGANIZED HEALTH CARE EDUCATION/TRAINING PROGRAM

## 2025-05-07 PROCEDURE — 70496 CT ANGIOGRAPHY HEAD: CPT

## 2025-05-07 PROCEDURE — 36415 COLL VENOUS BLD VENIPUNCTURE: CPT

## 2025-05-07 PROCEDURE — 71046 X-RAY EXAM CHEST 2 VIEWS: CPT

## 2025-05-07 PROCEDURE — 85610 PROTHROMBIN TIME: CPT | Performed by: STUDENT IN AN ORGANIZED HEALTH CARE EDUCATION/TRAINING PROGRAM

## 2025-05-07 RX ORDER — SODIUM CHLORIDE 9 MG/ML
40 INJECTION, SOLUTION INTRAVENOUS AS NEEDED
Status: DISCONTINUED | OUTPATIENT
Start: 2025-05-07 | End: 2025-05-08 | Stop reason: HOSPADM

## 2025-05-07 RX ORDER — ZOLPIDEM TARTRATE 5 MG/1
5 TABLET ORAL NIGHTLY PRN
Status: DISCONTINUED | OUTPATIENT
Start: 2025-05-07 | End: 2025-05-08 | Stop reason: HOSPADM

## 2025-05-07 RX ORDER — ACETAMINOPHEN 500 MG
1000 TABLET ORAL ONCE
Status: COMPLETED | OUTPATIENT
Start: 2025-05-07 | End: 2025-05-07

## 2025-05-07 RX ORDER — SODIUM CHLORIDE 0.9 % (FLUSH) 0.9 %
10 SYRINGE (ML) INJECTION EVERY 12 HOURS SCHEDULED
Status: DISCONTINUED | OUTPATIENT
Start: 2025-05-07 | End: 2025-05-08 | Stop reason: HOSPADM

## 2025-05-07 RX ORDER — DICYCLOMINE HYDROCHLORIDE 10 MG/1
20 CAPSULE ORAL 4 TIMES DAILY
Status: DISCONTINUED | OUTPATIENT
Start: 2025-05-07 | End: 2025-05-08 | Stop reason: HOSPADM

## 2025-05-07 RX ORDER — LISINOPRIL 20 MG/1
40 TABLET ORAL DAILY
Status: DISCONTINUED | OUTPATIENT
Start: 2025-05-08 | End: 2025-05-08 | Stop reason: HOSPADM

## 2025-05-07 RX ORDER — SUMATRIPTAN SUCCINATE 100 MG/1
100 TABLET ORAL
Status: DISCONTINUED | OUTPATIENT
Start: 2025-05-07 | End: 2025-05-08 | Stop reason: HOSPADM

## 2025-05-07 RX ORDER — PANTOPRAZOLE SODIUM 40 MG/1
40 TABLET, DELAYED RELEASE ORAL DAILY
Status: DISCONTINUED | OUTPATIENT
Start: 2025-05-08 | End: 2025-05-08 | Stop reason: HOSPADM

## 2025-05-07 RX ORDER — IOPAMIDOL 755 MG/ML
100 INJECTION, SOLUTION INTRAVASCULAR
Status: COMPLETED | OUTPATIENT
Start: 2025-05-07 | End: 2025-05-07

## 2025-05-07 RX ORDER — SODIUM CHLORIDE 0.9 % (FLUSH) 0.9 %
10 SYRINGE (ML) INJECTION AS NEEDED
Status: DISCONTINUED | OUTPATIENT
Start: 2025-05-07 | End: 2025-05-08 | Stop reason: HOSPADM

## 2025-05-07 RX ADMIN — DICYCLOMINE HYDROCHLORIDE 20 MG: 10 CAPSULE ORAL at 20:49

## 2025-05-07 RX ADMIN — ZOLPIDEM TARTRATE 5 MG: 5 TABLET ORAL at 23:24

## 2025-05-07 RX ADMIN — ACETAMINOPHEN 1000 MG: 500 TABLET, FILM COATED ORAL at 16:54

## 2025-05-07 RX ADMIN — Medication 10 ML: at 20:49

## 2025-05-07 RX ADMIN — IOPAMIDOL 95 ML: 755 INJECTION, SOLUTION INTRAVENOUS at 17:59

## 2025-05-07 NOTE — ED PROVIDER NOTES
EMERGENCY DEPARTMENT ENCOUNTER  Room Number:  24/24  PCP: Josef Kemp III, NP-C  Independent Historians: Patient and Son      HPI:  Chief Complaint: had concerns including Blurred Vision.     A complete HPI/ROS/PMH/PSH/SH/FH are unobtainable due to: None    Chronic or social conditions impacting patient care (Social Determinants of Health): None      Context: Veronica Babb is a 89 y.o. female with a medical history of hypertension, hyponatremia, osteoarthritis of hip and long-term NSAID use, who presents to the ED c/o acute vision loss in right eye.  She states that she has had gradual vision loss for a long time, at some point noticed it was significantly worse.  No changes over the past several days her pain.  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  Patient denies history of stroke or cardiac history.      Review of prior external notes (non-ED) -and- Review of prior external test results outside of this encounter:  I reviewed hip x-ray from 2/8/2024.  Suspicious for subcapital left femoral neck fracture.  Possible left greater trochanteric fracture as well.    I reviewed note from Dr. Chester Herring with [facility], rustam-retinal vein occlusion OD.    Prescription drug monitoring program review:         PAST MEDICAL HISTORY  Active Ambulatory Problems     Diagnosis Date Noted    Primary insomnia 10/04/2016    Lumbar disc disease 10/04/2016    Trapezius muscle spasm 12/14/2016    Knee injury 07/23/2017    Primary osteoarthritis of right knee 07/23/2017    Status post total hip replacement, right 07/23/2017    Essential hypertension 11/01/2017    Gastroesophageal reflux disease with esophagitis 03/27/2018    Migraine without aura 07/17/2018    Overactive bladder 07/17/2018    Cervical disc disease 10/23/2018    Irritable bowel syndrome with constipation  2019    Hyponatremia 10/23/2019    Reactive depression 10/23/2019    OA (osteoarthritis) of knee 2021    Primary osteoarthritis of right hip 2024    Primary osteoarthritis of left hip 2024    NSAID long-term use 2024    Heme positive stool 2024    Anemia 2024    Encounter for long-term (current) use of non-steroidal anti-inflammatories 2024    Encounter for screening for malignant neoplasm of colon 2024     Resolved Ambulatory Problems     Diagnosis Date Noted    Essential hypertension 2019     Past Medical History:   Diagnosis Date    COVID-19 vaccine series completed     Hypertension     IBS (irritable bowel syndrome)     Insomnia     Limited joint range of motion (ROM)     Migraines     Murmur     Right knee pain     Weakness          PAST SURGICAL HISTORY  Past Surgical History:   Procedure Laterality Date    BREAST AUGMENTATION      CATARACT EXTRACTION, BILATERAL      COLONOSCOPY  2011    HIP ARTHROPLASTY Right     HYSTERECTOMY      TOTAL KNEE ARTHROPLASTY Right 2021    Procedure: TOTAL KNEE ARTHROPLASTY;  Surgeon: Jian Hernandez MD;  Location: [facility];  Service: Orthopedics;  Laterality: Right;         FAMILY HISTORY  Family History   Problem Relation Age of Onset    Irritable bowel syndrome Daughter     [place] Hyperthermia Neg Hx     Colon cancer Neg Hx     Colon polyps Neg Hx     Crohn's disease Neg Hx     Ulcerative colitis Neg Hx          SOCIAL HISTORY  Social History     Socioeconomic History    Marital status:    Tobacco Use    Smoking status: Former     Current packs/day: 0.00     Average packs/day: 0.5 packs/day for 3.0 years (1.5 ttl pk-yrs)     Types: Cigarettes     Start date:      Quit date:      Years since quittin.3     Passive exposure: Never    Smokeless tobacco: Never    Tobacco comments:     IN HER 20'S   Vaping Use    Vaping status: Never Used   Substance and Sexual Activity    Alcohol use: No     Drug use: Never    Sexual activity: Never       ALLERGIES  Patient has no known allergies.      PHYSICAL EXAM    I have reviewed the triage vital signs and nursing notes.    ED Triage Vitals   Temp Heart Rate Resp BP SpO2   03/02/25 1448 03/02/25 1448 03/02/25 1448 03/02/25 1450 03/02/25 1448   97.4 °F (36.3 °C) 95 16 141/84 97 %      Temp src Heart Rate Source Patient Position BP Location FiO2 (%)   -- -- -- -- --              Physical Exam  GENERAL: alert, no acute distress  SKIN: Warm, dry  HENT: Normocephalic, atraumatic  EYES: no scleral icterus, EOMI, pupils 3 mm, no gross visual fields deficits  CV: regular rhythm, regular rate  RESPIRATORY: normal effort, lungs clear  ABDOMEN: soft, nondistended, nontender  MUSCULOSKELETAL: no deformity  NEURO: alert, moves all extremities, follows commands        LAB RESULTS  Recent Results (from the past 24 hours)   Green Top (Gel)    Collection Time: 05/07/25  3:48 PM   Result Value Ref Range    Extra Tube Hold for add-ons.    Lavender Top    Collection Time: 05/07/25  3:48 PM   Result Value Ref Range    Extra Tube hold for add-on    Light Blue Top    Collection Time: 05/07/25  3:48 PM   Result Value Ref Range    Extra Tube Hold for add-ons.    Comprehensive Metabolic Panel    Collection Time: 05/07/25  3:48 PM    Specimen: Blood   Result Value Ref Range    Glucose 83 65 - 99 mg/dL    BUN 19 8 - 23 mg/dL    Creatinine 0.76 0.57 - 1.00 mg/dL    Sodium 135 (L) 136 - 145 mmol/L    Potassium 4.3 3.5 - 5.2 mmol/L    Chloride 101 98 - 107 mmol/L    CO2 23.3 22.0 - 29.0 mmol/L    Calcium 9.1 8.6 - 10.5 mg/dL    Total Protein 7.0 6.0 - 8.5 g/dL    Albumin 3.8 3.5 - 5.2 g/dL    ALT (SGPT) 5 1 - 33 U/L    AST (SGOT) 15 1 - 32 U/L    Alkaline Phosphatase 82 39 - 117 U/L    Total Bilirubin 0.3 0.0 - 1.2 mg/dL    Globulin 3.2 gm/dL    A/G Ratio 1.2 g/dL    BUN/Creatinine Ratio 25.0 7.0 - 25.0    Anion Gap 10.7 5.0 - 15.0 mmol/L    eGFR 75.5 >60.0 mL/min/1.73   Protime-INR     Collection Time: 05/07/25  3:48 PM    Specimen: Blood   Result Value Ref Range    Protime 13.3 11.7 - 14.2 Seconds    INR 1.02 0.90 - 1.10   aPTT    Collection Time: 05/07/25  3:48 PM    Specimen: Blood   Result Value Ref Range    PTT 35.0 22.7 - 35.4 seconds   High Sensitivity Troponin T    Collection Time: 05/07/25  3:48 PM    Specimen: Blood   Result Value Ref Range    HS Troponin T 27 (H) <14 ng/L   CBC Auto Differential    Collection Time: 05/07/25  3:48 PM    Specimen: Blood   Result Value Ref Range    WBC 5.49 3.40 - 10.80 10*3/mm3    RBC 3.77 3.77 - 5.28 10*6/mm3    Hemoglobin 11.0 (L) 12.0 - 15.9 g/dL    Hematocrit 33.4 (L) 34.0 - 46.6 %    MCV 88.6 79.0 - 97.0 fL    MCH 29.2 26.6 - 33.0 pg    MCHC 32.9 31.5 - 35.7 g/dL    RDW 13.0 12.3 - 15.4 %    RDW-SD 41.4 37.0 - 54.0 fl    MPV 10.4 6.0 - 12.0 fL    Platelets 245 140 - 450 10*3/mm3    Neutrophil % 74.5 42.7 - 76.0 %    Lymphocyte % 9.3 (L) 19.6 - 45.3 %    Monocyte % 11.8 5.0 - 12.0 %    Eosinophil % 3.5 0.3 - 6.2 %    Basophil % 0.5 0.0 - 1.5 %    Immature Grans % 0.4 0.0 - 0.5 %    Neutrophils, Absolute 4.09 1.70 - 7.00 10*3/mm3    Lymphocytes, Absolute 0.51 (L) 0.70 - 3.10 10*3/mm3    Monocytes, Absolute 0.65 0.10 - 0.90 10*3/mm3    Eosinophils, Absolute 0.19 0.00 - 0.40 10*3/mm3    Basophils, Absolute 0.03 0.00 - 0.20 10*3/mm3    Immature Grans, Absolute 0.02 0.00 - 0.05 10*3/mm3    nRBC 0.0 0.0 - 0.2 /100 WBC   ECG 12 Lead Other; stroke    Collection Time: 05/07/25  4:23 PM   Result Value Ref Range    QT Interval 409 ms    QTC Interval 435 ms   High Sensitivity Troponin T 1Hr    Collection Time: 05/07/25  4:51 PM    Specimen: Blood   Result Value Ref Range    HS Troponin T 25 (H) <14 ng/L    Troponin T Numeric Delta -2 ng/L    Troponin T % Delta -7 Abnormal if >/= 20%       RADIOLOGY  No Radiology Exams Resulted Within Past 24 Hours      MEDICATIONS GIVEN IN ER  Medications   acetaminophen (TYLENOL) tablet 1,000 mg (1,000 mg Oral Given 5/7/25 4449)    iopamidol (ISOVUE-370) 76 % injection 100 mL (95 mL Intravenous Given 5/7/25 3216)         ORDERS PLACED DURING THIS VISIT:  Orders Placed This Encounter   Procedures    CT Angiogram Neck    CT Angiogram Head    [place] Draw    Comprehensive Metabolic Panel    Protime-INR    aPTT    High Sensitivity Troponin T    CBC Auto Differential    High Sensitivity Troponin T 1Hr    Inpatient Neurology Consult Stroke    ECG 12 Lead Other; stroke    Green Top (Gel)    Lavender Top    Gold Top - SST    Light Blue Top    CBC & Differential         OUTPATIENT MEDICATION MANAGEMENT:  No current Epic-ordered facility-administered medications on file.     Current Outpatient Medications Ordered in Epic   Medication Sig Dispense Refill    celecoxib (CeleBREX) 200 MG capsule TAKE 1 CAPSULE BY MOUTH DAILY 30 capsule 1    dicyclomine (BENTYL) 20 MG tablet TAKE 1 TABLET BY MOUTH EVERY 6 HOURS 60 tablet 5    lisinopril (PRINIVIL,ZESTRIL) 40 MG tablet TAKE 1 TABLET BY MOUTH DAILY 90 tablet 1    pantoprazole (PROTONIX) 40 MG EC tablet Take 1 tablet by mouth Daily. 90 tablet 3    SUMAtriptan (IMITREX) 100 MG tablet TAKE 1 TABLET BY MOUTH AT ONSET OF HEADACHE. MAY REPEAT DOSE 1 TIME IN 2 HOURS IF HEADACHE NOT RELIEVED 12 tablet 0    zolpidem (AMBIEN) 5 MG tablet TAKE 1 TABLET BY MOUTH AT NIGHT AS NEEDED FOR SLEEP 30 tablet 2         PROCEDURES  Procedures      Critical care provider statement:    Critical care time (minutes): 31.   Critical care time was exclusive of:  Separately billable procedures and treating other patients   Critical care was necessary to treat or prevent imminent or life-threatening deterioration of the following conditions:  CNS Failure   Critical care was time spent personally by me on the following activities:  Development of treatment plan with patient or surrogate, discussions with consultants, evaluation of patient's response to treatment, examination of patient, obtaining history from patient or surrogate,  ordering and performing treatments and interventions, ordering and review of laboratory studies, ordering and review of radiographic studies, pulse oximetry, re-evaluation of patient's condition and review of old charts. Critical Care indicators: Stroke       PROGRESS, DATA ANALYSIS, CONSULTS, AND MEDICAL DECISION MAKING  All labs have been independently interpreted by me.  All radiology studies have been reviewed by me. All EKG's have been independently viewed and interpreted by me.  Discussion below represents my analysis of pertinent findings related to patient's condition, differential diagnosis, treatment plan and final disposition.    Differential diagnosis includes but is not limited to retinal vein occlusion, retinal artery occlusion, stroke, dysrhythmia, STEMI, NSTEMI, critical stenosis.    Clinical Scores:                       Total (NIH Stroke Scale): 0               ED Course as of 05/07/25 1804   Wed May 07, 2025   1527 Patient presents to the ED with son for evaluation of vision loss in right eye.  She states that she has had gradual vision loss for a long time, at some point noticed it was significantly worse.  No changes over the past several days her pain.  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  Patient denies history of stroke or cardiac history.    On exam patient is well-appearing.  She has no obvious visual field deficits.  EOMI.  Endorses normal monocular vision in both eyes.    Will obtain basic labs and coags, CTAs and EKG, plan to admit for remainder of stroke workup.  Patient and family are agreeable [DN]   1623 I discussed patient Dr. Orellana, stroke attending, agreeable with plan to consult for retinal vein occlusion [DN]   1626 ECG 12 Lead Other; stroke  Sinus rhythm, rate 68, LAD with LAFB, normal intervals, patient with T wave inversions in the  inferior and lateral leads, ST elevation in V2, no STEMI    I compared EKG to prior on 6/24/2021.  T/ST changes today are new when compared to prior EKGs interpreted by self [DN]   1644 HS Troponin T(!): 27 [DN]   1644 Creatinine: 0.76 [DN]   1733 Troponin T Numeric Delta: -2 [DN]   1803 CT Angiogram Head  I reviewed patient's CT image(s), no obvious ICH or LVO, interpreted by self  I reviewed the radiologist's interpretation of above image(s)   [DN]   1803 I discussed patient on-call JOSEPH for observation unit, agreeable plan to admit [DN]      ED Course User Index  [DN] Franko Bey MD             AS OF 18:04 EDT VITALS:    BP - (!) 181/108  HR - 98  TEMP - 98.4 °F (36.9 °C)  O2 SATS - 94%    COMPLEXITY OF CARE  The patient requires admission.      DIAGNOSIS  Final diagnoses:   Central retinal vein occlusion of right eye, unspecified complication status   Uncontrolled hypertension         DISPOSITION  ED Disposition       ED Disposition   Decision to Admit    Condition   --    Comment   --               New Medications Ordered This Visit   Medications    acetaminophen (TYLENOL) tablet 1,000 mg    iopamidol (ISOVUE-370) 76 % injection 100 mL       Please note that portions of this document were completed with a voice recognition program.    Note Disclaimer: At [facility], we believe that sharing information builds trust and better relationships. You are receiving this note because you recently visited [facility]. It is possible you will see health information before a provider has talked with you about it. This kind of information can be easy to misunderstand. To help you fully understand what it means for your health, we urge you to discuss this note with your provider.         Franko Bey MD  05/07/25 8135       Franko Bey MD  05/07/25 9384

## 2025-05-07 NOTE — CONSULTS
Neurology Consult Note    Consult Date: 5/7/2025    Referring MD: Marlene Li MD    Reason for Consult I have been asked to see the patient in neurological consultation to render advice and opinion regarding acute right eye vision loss.     Veronica Babb is a 89 y.o. female with significant PMH of hypertension, IBS, migraines, osteoarthritis on long-term NSAID use presents to the ED with acute loss of vision in left eye.  Patient states that vision loss was gradual over a prolonged period of time but recently became significant worse.  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  Patient tells me that she did not appreciate any blurry vision worse in the left eye did not know she had an issue with the left eye until she went to the eye doctor today.  However per chart review she has had left eye blurry vision for 1 months and worsened several days ago so she went to the eye doctor.  She denies associated pain.  Wonder if elevated of dementia as history keeps changing and she is told some people left eye and other's right.  She is alert and oriented x 4 on exam but does have some issues with concentration.  No family at bedside to  confirm baseline.  She denies any other associated focal deficits or headache, nausea/vomiting.  She denies chest pain/pressure, palpitations, shortness of breath, hemoptysis, cough, unintentional weight loss, fever or chills.  Patient denies history of stroke or cardiac history.  Blood pressure arrival 181/108 mmHg and pulse 98 bpm.  Max blood pressure 190/89 mmHg. she denies any alcohol use.  She states she smoked tobacco for about 10 years 4 to 5 cigarettes/day when she was much younger from age [age].  Has not smoked since.  Denies extensive passive exposure to cigarettes.    Past Medical/Surgical Hx:  Past Medical History:   Diagnosis Date    COVID-19  "vaccine series completed     2021    Hypertension     IBS (irritable bowel syndrome)     Insomnia     Limited joint range of motion (ROM)     RIGHT KNEE    Migraines     Murmur     STATES WAS TOLD THIS WAS \"SLIGHT\"    Right knee pain     Weakness     RIGHT KNEE     Past Surgical History:   Procedure Laterality Date    BREAST AUGMENTATION      CATARACT EXTRACTION, BILATERAL      COLONOSCOPY  2011    HIP ARTHROPLASTY Right     HYSTERECTOMY      TOTAL KNEE ARTHROPLASTY Right 2021    Procedure: TOTAL KNEE ARTHROPLASTY;  Surgeon: Jian Hernandez MD;  Location: [facility] OR;  Service: Orthopedics;  Laterality: Right;       Medications On Admission  (Not in a hospital admission)      Allergies:  No Known Allergies    Social Hx:  Social History     Socioeconomic History    Marital status:    Tobacco Use    Smoking status: Former     Current packs/day: 0.00     Average packs/day: 0.5 packs/day for 3.0 years (1.5 ttl pk-yrs)     Types: Cigarettes     Start date:      Quit date:      Years since quittin.3     Passive exposure: Never    Smokeless tobacco: Never    Tobacco comments:     IN HER 20'S   Vaping Use    Vaping status: Never Used   Substance and Sexual Activity    Alcohol use: No    Drug use: Never    Sexual activity: Never       Family Hx:  Family History   Problem Relation Age of Onset    Irritable bowel syndrome Daughter     Malig Hyperthermia Neg Hx     Colon cancer Neg Hx     Colon polyps Neg Hx     Crohn's disease Neg Hx     Ulcerative colitis Neg Hx        Exam    BP (!) 190/89   Pulse 65   Temp 98.4 °F (36.9 °C)   Resp 15   Ht 160 cm (62.99\")   Wt 50 kg (110 lb 3.7 oz)   SpO2 97%   BMI 19.53 kg/m²   gen: NAD, vitals reviewed  MS: oriented x3, recent/remote memory intact, normal attention/concentration, language intact, no neglect, normal fund of knowledge  CN: visual acuity grossly normal, visual fields full, PERRL, EOMI, facial sensation equal, no facial droop, " "hearing symmetric, palate elevates symmetrically, shoulder shrug equal, tongue midline  Motor: 5/5 throughout upper and lower extremities, normal tone  Sensation: intact to vibration and temperature throughout  Coordination: no dysmetria with finger to nose bilaterally    DATA:    Lab Results   Component Value Date    GLUCOSE 83 05/07/2025    CALCIUM 9.1 05/07/2025     (L) 05/07/2025    K 4.3 05/07/2025    CO2 23.3 05/07/2025     05/07/2025    BUN 19 05/07/2025    CREATININE 0.76 05/07/2025    EGFRIFAFRI 100 01/29/2021    EGFRIFNONA 70 06/24/2021    BCR 25.0 05/07/2025    ANIONGAP 10.7 05/07/2025     Lab Results   Component Value Date    WBC 5.49 05/07/2025    HGB 11.0 (L) 05/07/2025    HCT 33.4 (L) 05/07/2025    MCV 88.6 05/07/2025     05/07/2025     No results found for: \"LDL\"  No results found for: \"HGBA1C\"  Lab Results   Component Value Date    INR 1.02 05/07/2025    PROTIME 13.3 05/07/2025       Lab review:   CMP and CBC reviewed    Imaging review:   Head CT: There is no CT evidence of acute intracranial hemorrhage, mass,  or infarct. There is advanced volume loss, but there is no evidence of  hydrocephalus or extra-axial fluid collection.  There are advanced  nonspecific white matter changes, but there is no evidence of acute  intracranial abnormality.     CTA neck: There is a normal aortic arch branching pattern without  proximal great vessel stenosis. Both vertebral arteries are patent  throughout the neck, and supply the basilar artery.  Both common carotid  arteries are normally patent. There is plaque at both cervical carotid  bifurcations, with 0% stenosis in both internal carotid arteries.     CTA head:  There is symmetric distal intracranial runoff in the  anterior, middle, and posterior cerebral artery territories.  There is  no evidence of intracranial aneurysm, or of high-grade intracranial  flow-limiting stenosis.  There is no evidence of branch vessel  occlusion, or region or " zone of hypoperfusion.  The dural venous sinuses  appear normal.     Extravascular structures: There is no intracranial mass or abnormal  enhancement.   The extracranial and cervical soft tissue structures show  no acute abnormality. There is no acute bony abnormality, but there are  extensive advanced cervical spinal degenerative changes. Partially  visualized medial left apical spiculated soft tissue mass measuring at  least 1.9 x 2.6 cm in size, almost certainly representing bronchogenic  carcinoma. Partially seen prominent AP window lymph node measuring at  least 1.1 x 3.0 cm.     IMPRESSION:  Head CT demonstrates advanced senescent changes without convincing  evidence of acute intracranial abnormality.  There is plaque at both cervical carotid bifurcations, but 0% stenosis  in both internal carotid arteries. Both vertebral arteries are patent  throughout the neck.  Negative head CT angiogram, no acute intracranial vascular abnormality.  Partially seen left upper lobe spiculated soft tissue density lung mass  at least 1.9 x 2.6 cm in size, almost certainly representing  bronchogenic carcinoma, partially seen suspicious AP window lymph node.    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  Seen incidentally on CTA head and neck.  Uncontrolled hypertension  Osteoarthritis    Comment: Patient comes into the ED for evaluation of central retinal venous occlusion diagnosed eye doctor today.  She denies associated pain.  Denies any speech deficit, unilateral weakness/numbness, facial droop, headache, nausea/vomiting, dizziness, gait imbalance.  Blood pressure markedly elevated on arrival to the ED.  She states she does not monitor this at home at all and encouraged her to start taking blood pressure around the same time each day on the same arm and document bring to primary care provider in the next week or 2  to ensure antihypertensives are adjusted if need be.  Goal blood pressure less than 140/90 mmHg.    CT head demonstrates no acute hemorrhage or hypodensity.  There is plaque in both cervical carotid bifurcations but 0% stenosis appreciated in both internal ICAs.  No severe stenosis or occlusion appreciated.  Unfortunately, there was an incidental finding of left upper lobe spiculated soft tissue density lung mass at least 1.9 cm x 2.6 cm likely representing bronchogenic carcinoma and partially seen suspicious lymph node.    PLAN:   MRI brain with and without contrast  TTE  -Admit to observation unit  -Give aspirin now; if cannot swallow give OK  -Statin  -Maintain permissive HTN for 24-48hrs, do not treat unless BP > 220/120 if no contraindication  -Perform bedside swallow test  -Telemetry to monitor for arrhythmia  -VTE prophylaxis  -Neuro checks, if change in exam call neuro oncall  -PT/OT when appropriate   -Hemoglobin A1c, lipid panel  -Will defer management of left upper lobe lung mass and abnormal lymph node to primary care team.   She states she does not monitor this at home at all and encouraged her to start taking blood pressure around the same time each day on the same arm and document bring to primary care provider in the next week or 2 to ensure antihypertensives are adjusted if need be.  Goal blood pressure less than 140/90 mmHg.  Stroke education.    Recommendations discussed with Dr. Grover who is in agreement with plan.

## 2025-05-07 NOTE — H&P
"[facility]   HISTORY AND PHYSICAL    Patient Name: Veronica Babb  : 1935  MRN: [id number]  Primary Care Physician:  Josef Kemp III, NP-C  Date of admission: 2025    Subjective   Subjective     Chief Complaint:   Chief Complaint   Patient presents with    Blurred Vision         HPI:    Veronica Babb is a 89 y.o. female patient comes in complaining of blurred vision of the right eye.  Patient states that she has had issues with blurred vision of her right eye for some time.  Patient denies any pain.  Patient states she was had a routine checkup of her eye doctor today.  She was told at the eye doctor office that they are worried for a Rustam retinal vein occlusion and recommended to go to the ER for further evaluation.  Patient is somewhat forgetful in conversation however she is alert and oriented times 3 out of 3 currently.  Patient denies any numbness or tingling of face or extremities, speech difficulty or weakness of extremities.  Patient states she walks with a cane and walker at baseline and lives home alone.    In the ED, troponin 27, repeat troponin 25, sodium of 135 otherwise unremarkable CMP for acute findings.  CTA head and neck showed no acute findings.  Partially seen left upper lobe spiculated soft tissue density lung mass at least 1.9 x 2.6 cm in size.  Almost certainly representing a bronchogenic carcinoma.  Patient is afebrile, pulse in the 70s, on room air oxygen 100% SpO2 and blood pressure 190s over 90s.  Patient given Tylenol in ED.     Review of Systems   All systems were reviewed and negative except for: as per HPI     Personal History     Past Medical History:   Diagnosis Date    COVID-19 vaccine series completed     2021    Hypertension     IBS (irritable bowel syndrome)     Insomnia     Limited joint range of motion (ROM)     RIGHT KNEE    Migraines     Murmur     STATES WAS TOLD THIS WAS \"SLIGHT\"    Right knee pain     Weakness     RIGHT KNEE "       Past Surgical History:   Procedure Laterality Date    BREAST AUGMENTATION      CATARACT EXTRACTION, BILATERAL      COLONOSCOPY  01/19/2011    HIP ARTHROPLASTY Right     HYSTERECTOMY      TOTAL KNEE ARTHROPLASTY Right 7/7/2021    Procedure: TOTAL KNEE ARTHROPLASTY;  Surgeon: Jian Hernandez MD;  Location: [facility];  Service: Orthopedics;  Laterality: Right;       Family History: family history includes Irritable bowel syndrome in her daughter. Otherwise pertinent FHx was reviewed and not pertinent to current issue.    Social History:  reports that she quit smoking about 68 years ago. Her smoking use included cigarettes. She started smoking about 71 years ago. She has a 1.5 pack-year smoking history. She has never been exposed to tobacco smoke. She has never used smokeless tobacco. She reports that she does not drink alcohol and does not use drugs.    Home Medications:  SUMAtriptan, celecoxib, dicyclomine, lisinopril, pantoprazole, and zolpidem    Allergies:  No Known Allergies    Objective   Objective     Vitals:   Temp:  [97.9 °F (36.6 °C)-98.4 °F (36.9 °C)] 97.9 °F (36.6 °C)  Heart Rate:  [65-98] 70  Resp:  [15-18] 18  BP: (181-190)/() 190/91  Physical Exam    Constitutional: Awake, alert   Eyes: PERRLA, sclerae anicteric, no conjunctival injection   HENT: NCAT, mucous membranes moist   Neck: Supple, no thyromegaly, no lymphadenopathy, trachea midline   Respiratory: Clear to auscultation bilaterally, nonlabored respirations    Cardiovascular: RRR, no murmurs, rubs, or gallops, palpable pedal pulses bilaterally   Gastrointestinal: Positive bowel sounds, soft, nontender, nondistended   Musculoskeletal: No bilateral ankle edema, no clubbing or cyanosis to extremities   Psychiatric: Appropriate affect, cooperative   Neurologic: Patient does have noted blurred vision in all visual fields of the right eye, normal of left eye for my evaluation.  Otherwise oriented x 3, strength symmetric in all  extremities, Cranial Nerves grossly intact to confrontation, speech clear. NIH 1.   Skin: No rashes     Result Review    Result Review:  I have personally reviewed the results from the time of this admission to 5/7/2025 21:19 EDT and agree with these findings:  [x]  Laboratory list / accordion  []  Microbiology  [x]  Radiology  [x]  EKG/Telemetry   []  Cardiology/Vascular   []  Pathology  []  Old records  []  Other:  Most notable findings include: see above      The ASCVD Risk score (Christine YBARRA, et al., 2019) failed to calculate for the following reasons:    The 2019 ASCVD risk score is only valid for ages 40 to 79      Assessment & Plan   Assessment / Plan     Brief Patient Summary:  Veronica Babb is a 89 y.o. female who comes in complaining of vision loss of right eye.    Active Hospital Problems:  Active Hospital Problems    Diagnosis     **Vision loss, right eye      Plan:         Right eye vision loss  -Was seen at eye doctor for routine visit earlier today and evaluation was worrisome for possible retinal vein occlusion  - troponin 27, repeat troponin 25,   -CTA head and neck showed no acute findings.  Partially seen left upper lobe spiculated soft tissue density lung mass at least 1.9 x 2.6 cm in size.  Almost certainly representing a bronchogenic carcinoma.    -Patient is afebrile, pulse in the 70s, on room air oxygen 100% SpO2 and blood pressure 190s over 90s.    -Patient given Tylenol in ED.   -Neurology consult  -Check MRI brain with and without contrast  -Check echo  -Check B12, TSH, A1c, lipid panel  -Aspirin and statin ordered  -PT OT ST consult  -Neurochecks, continuous cardiac monitoring  -NIH of 1 upon admission  -Permissive hypertension  -N.p.o. until patient can pass swallow screen otherwise heart healthy diet    Incidental finding of lung mass  - Check chest x-ray, no recent chest x-ray on file since 2012, no reported history of cancer    Essential hypertension, chronic, poorly controlled  -  Permissive hypertension for now  - Continue lisinopril when appropriate, hold for now    GERD  - Continue home PPI            VTE Prophylaxis:  Mechanical VTE prophylaxis orders are present.        CODE STATUS:       Admission Status:  I believe this patient meets observation status.    78 minutes have been spent by [facility] providers in the care of this patient while under observation status.      Appropriate PPE worn during patient encounter.  Hand hygeine performed before and after seeing the patient.      Electronically signed by RODERICK Flood, 05/07/25, 7:23 PM EDT.

## 2025-05-07 NOTE — ED NOTES
"Nursing report ED to floor  Veronica Babb  89 y.o.  female    HPI :  HPI  Stated Reason for Visit: eye blurriness    Chief Complaint  Chief Complaint   Patient presents with    Blurred Vision       Admitting doctor:   Marlene Li MD    Admitting diagnosis:   The primary encounter diagnosis was Central retinal vein occlusion of right eye, unspecified complication status. A diagnosis of Uncontrolled hypertension was also pertinent to this visit.    Code status:   Current Code Status       Date Active Code Status Order ID Comments User Context       Not on file            Allergies:   Patient has no known allergies.    Isolation:   No active isolations    Intake and Output  No intake or output data in the 24 hours ending 05/07/25 1816    Weight:       05/07/25  1527   Weight: 50 kg (110 lb 3.7 oz)       Most recent vitals:   Vitals:    05/07/25 1527 05/07/25 1541 05/07/25 1716   BP:  (!) 181/108 (!) 190/89   Pulse: 98  65   Resp: 15     Temp: 98.4 °F (36.9 °C)     SpO2: 94%  97%   Weight: 50 kg (110 lb 3.7 oz)     Height: 160 cm (62.99\")         Active LDAs/IV Access:   Lines, Drains & Airways       Active LDAs       Name Placement date Placement time Site Days    Peripheral IV 05/07/25 1547 20 G Right Antecubital 05/07/25  1547  Antecubital  less than 1                    Labs (abnormal labs have a star):   Labs Reviewed   COMPREHENSIVE METABOLIC PANEL - Abnormal; Notable for the following components:       Result Value    Sodium 135 (*)     All other components within normal limits    Narrative:     GFR Categories in Chronic Kidney Disease (CKD)              GFR Category          GFR (mL/min/1.73)    Interpretation  G1                    90 or greater        Normal or high (1)  G2                    60-89                Mild decrease (1)  G3a                   45-59                Mild to moderate decrease  G3b                   30-44                Moderate to severe decrease  G4                    15-29          "       Severe decrease  G5                    14 or less           Kidney failure    (1)In the absence of evidence of kidney disease, neither GFR category G1 or G2 fulfill the criteria for CKD.    eGFR calculation 2021 CKD-EPI creatinine equation, which does not include race as a factor   TROPONIN - Abnormal; Notable for the following components:    HS Troponin T 27 (*)     All other components within normal limits    Narrative:     High Sensitive Troponin T Reference Range:  <14.0 ng/L- Negative Female for AMI  <22.0 ng/L- Negative Male for AMI  >=14 - Abnormal Female indicating possible myocardial injury.  >=22 - Abnormal Male indicating possible myocardial injury.   Clinicians would have to utilize clinical acumen, EKG, Troponin, and serial changes to determine if it is an Acute Myocardial Infarction or myocardial injury due to an underlying chronic condition.        CBC WITH AUTO DIFFERENTIAL - Abnormal; Notable for the following components:    Hemoglobin 11.0 (*)     Hematocrit 33.4 (*)     Lymphocyte % 9.3 (*)     Lymphocytes, Absolute 0.51 (*)     All other components within normal limits   HIGH SENSITIVITIY TROPONIN T 1HR - Abnormal; Notable for the following components:    HS Troponin T 25 (*)     All other components within normal limits    Narrative:     High Sensitive Troponin T Reference Range:  <14.0 ng/L- Negative Female for AMI  <22.0 ng/L- Negative Male for AMI  >=14 - Abnormal Female indicating possible myocardial injury.  >=22 - Abnormal Male indicating possible myocardial injury.   Clinicians would have to utilize clinical acumen, EKG, Troponin, and serial changes to determine if it is an Acute Myocardial Infarction or myocardial injury due to an underlying chronic condition.        PROTIME-INR - Normal   APTT - Normal   RAINBOW DRAW    Narrative:     The following orders were created for panel order [place] Draw.  Procedure                               Abnormality         Status                      ---------                               -----------         ------                     Green Top (Gel)[[id number]]                                  Final result               Lavender Top[[id number]]                                     Final result               Gold Top - SST[[id number]]                                                              Light Blue Top[[id number]]                                   Final result                 Please view results for these tests on the individual orders.   POCT GLUCOSE FINGERSTICK   POCT GLUCOSE FINGERSTICK   POCT GLUCOSE FINGERSTICK   POCT GLUCOSE FINGERSTICK   GREEN TOP   LAVENDER TOP   LIGHT BLUE TOP   CBC AND DIFFERENTIAL    Narrative:     The following orders were created for panel order CBC & Differential.  Procedure                               Abnormality         Status                     ---------                               -----------         ------                     CBC Auto Differential[[id number]]        Abnormal            Final result                 Please view results for these tests on the individual orders.   [facility], later [facility]       EKG:   ECG 12 Lead Other; stroke   Preliminary Result   HEART RATE=68  bpm   RR Wmasqycg=751  ms   OH Bmmmcoce=009  ms   P Horizontal Axis=6  deg   P Front Axis=10  deg   QRSD Interval=95  ms   QT Oviazame=725  ms   XFwV=828  ms   QRS Axis=-59  deg   T Wave Axis=-12  deg   - ABNORMAL ECG -   Sinus rhythm   Atrial premature complex   LAD, consider left anterior fascicular block   Left ventricular hypertrophy   Anterior Q waves, possibly due to LVH   Abnormal T, consider ischemia, lateral leads   Date and Time of Study:2025-05-07 16:23:50          Meds given in ED:   Medications   sodium chloride 0.9 % flush 10 mL (has no administration in time range)   sodium chloride 0.9 % flush 10 mL (has no administration in time range)   sodium chloride 0.9 % infusion 40 mL (has no administration in time range)   acetaminophen (TYLENOL)  tablet 1,000 mg (1,000 mg Oral Given 25 8015)   iopamidol (ISOVUE-370) 76 % injection 100 mL (95 mL Intravenous Given 25 7198)       Imaging results:  No radiology results for the last day    Ambulatory status:   - x1    Social issues:   Social History     Socioeconomic History    Marital status:    Tobacco Use    Smoking status: Former     Current packs/day: 0.00     Average packs/day: 0.5 packs/day for 3.0 years (1.5 ttl pk-yrs)     Types: Cigarettes     Start date:      Quit date:      Years since quittin.3     Passive exposure: Never    Smokeless tobacco: Never    Tobacco comments:     IN HER 20'S   Vaping Use    Vaping status: Never Used   Substance and Sexual Activity    Alcohol use: No    Drug use: Never    Sexual activity: Never       Peripheral Neurovascular  Peripheral Neurovascular (Adult)  Peripheral Neurovascular WDL: WDL    Neuro Cognitive  Neuro Cognitive (Adult)  Cognitive/Neuro/Behavioral WDL: WDL  Additional Documentation: NIH Stroke Scale (group)  NIH Stroke Scale  Interval: baseline  1a. Level of Consciousness: 0-->Alert, keenly responsive  1b. LOC Questions: 0-->Answers both questions correctly  1c. LOC Commands: 0-->Performs both tasks correctly  2. Best Gaze: 0-->Normal  3. Visual: 0-->No visual loss (stated she is at her normal)  4. Facial Palsy: 0-->Normal symmetrical movements  5a. Motor Arm, Left: 0-->No drift, limb holds 90 (or 45) degrees for full 10 secs  5b. Motor Arm, Right: 0-->No drift, limb holds 90 (or 45) degrees for full 10 secs  6a. Motor Leg, Left: 0-->No drift, leg holds 30 degree position for full 5 secs  6b. Motor Leg, Right: 0-->No drift, leg holds 30 degree position for full 5 secs  7. Limb Ataxia: 0-->Absent  8. Sensory: 0-->Normal, no sensory loss  9. Best Language: 0-->No aphasia, normal  10. Dysarthria: 0-->Normal  11. Extinction and Inattention (formerly Neglect): 0-->No abnormality  Total (NIH Stroke Scale): 0    Learning  Learning  Assessment  Learning Readiness and Ability: no barriers identified    Respiratory  Respiratory  Airway WDL: WDL  Respiratory WDL  Respiratory WDL: WDL    Abdominal Pain       Pain Assessments  Pain (Adult)  (0-10) Pain Rating: Rest: 0    NIH Stroke Scale  NIH Stroke Scale  Interval: baseline  1a. Level of Consciousness: 0-->Alert, keenly responsive  1b. LOC Questions: 0-->Answers both questions correctly  1c. LOC Commands: 0-->Performs both tasks correctly  2. Best Gaze: 0-->Normal  3. Visual: 0-->No visual loss (stated she is at her normal)  4. Facial Palsy: 0-->Normal symmetrical movements  5a. Motor Arm, Left: 0-->No drift, limb holds 90 (or 45) degrees for full 10 secs  5b. Motor Arm, Right: 0-->No drift, limb holds 90 (or 45) degrees for full 10 secs  6a. Motor Leg, Left: 0-->No drift, leg holds 30 degree position for full 5 secs  6b. Motor Leg, Right: 0-->No drift, leg holds 30 degree position for full 5 secs  7. Limb Ataxia: 0-->Absent  8. Sensory: 0-->Normal, no sensory loss  9. Best Language: 0-->No aphasia, normal  10. Dysarthria: 0-->Normal  11. Extinction and Inattention (formerly Neglect): 0-->No abnormality  Total (NIH Stroke Scale): 0    Robi Christian RN  05/07/25 18:16 EDT

## 2025-05-07 NOTE — ED NOTES
Pt to Er via Pv from home for L eye blurriness x one month. Pt reports it worsened several days ago. PT went to her eye doctor today who told her to come to the ER

## 2025-05-08 ENCOUNTER — APPOINTMENT (OUTPATIENT)
Dept: MRI IMAGING | Facility: HOSPITAL | Age: 89
End: 2025-05-08
Payer: MEDICARE

## 2025-05-08 ENCOUNTER — APPOINTMENT (OUTPATIENT)
Dept: CARDIOLOGY | Facility: HOSPITAL | Age: 89
End: 2025-05-08
Payer: MEDICARE

## 2025-05-08 ENCOUNTER — READMISSION MANAGEMENT (OUTPATIENT)
Dept: CALL CENTER | Facility: HOSPITAL | Age: 89
End: 2025-05-08
Payer: MEDICARE

## 2025-05-08 VITALS
OXYGEN SATURATION: 96 % | TEMPERATURE: 98.1 F | RESPIRATION RATE: 18 BRPM | BODY MASS INDEX: 20.98 KG/M2 | WEIGHT: 114 LBS | HEIGHT: 62 IN | SYSTOLIC BLOOD PRESSURE: 147 MMHG | DIASTOLIC BLOOD PRESSURE: 87 MMHG | HEART RATE: 94 BPM

## 2025-05-08 PROBLEM — R91.8 PULMONARY MASS: Status: ACTIVE | Noted: 2025-05-08

## 2025-05-08 LAB
ANION GAP SERPL CALCULATED.3IONS-SCNC: 8.7 MMOL/L (ref 5–15)
AORTIC DIMENSIONLESS INDEX: 0.89 (DI)
ASCENDING AORTA: 3 CM
AV MEAN PRESS GRAD SYS DOP V1V2: 7.5 MMHG
AV VMAX SYS DOP: 165.4 CM/SEC
BH CV ECHO MEAS - ACS: 1.46 CM
BH CV ECHO MEAS - AO MAX PG: 10.9 MMHG
BH CV ECHO MEAS - AO ROOT AREA (BSA CORRECTED): 2.2 CM2
BH CV ECHO MEAS - AO ROOT DIAM: 3.3 CM
BH CV ECHO MEAS - AO V2 VTI: 34.3 CM
BH CV ECHO MEAS - AVA(I,D): 2.36 CM2
BH CV ECHO MEAS - EDV(CUBED): 100.9 ML
BH CV ECHO MEAS - EDV(MOD-SP2): 70 ML
BH CV ECHO MEAS - EDV(MOD-SP4): 60 ML
BH CV ECHO MEAS - EF(MOD-SP2): 65.7 %
BH CV ECHO MEAS - EF(MOD-SP4): 68.3 %
BH CV ECHO MEAS - ESV(CUBED): 24.6 ML
BH CV ECHO MEAS - ESV(MOD-SP2): 24 ML
BH CV ECHO MEAS - ESV(MOD-SP4): 19 ML
BH CV ECHO MEAS - FS: 37.5 %
BH CV ECHO MEAS - IVS/LVPW: 1.11 CM
BH CV ECHO MEAS - IVSD: 1.01 CM
BH CV ECHO MEAS - LAT PEAK E' VEL: 5.8 CM/SEC
BH CV ECHO MEAS - LV DIASTOLIC VOL/BSA (35-75): 39.9 CM2
BH CV ECHO MEAS - LV MASS(C)D: 152.5 GRAMS
BH CV ECHO MEAS - LV MAX PG: 10.8 MMHG
BH CV ECHO MEAS - LV MEAN PG: 5.9 MMHG
BH CV ECHO MEAS - LV SYSTOLIC VOL/BSA (12-30): 12.6 CM2
BH CV ECHO MEAS - LV V1 MAX: 164.2 CM/SEC
BH CV ECHO MEAS - LV V1 VTI: 30.5 CM
BH CV ECHO MEAS - LVIDD: 4.7 CM
BH CV ECHO MEAS - LVIDS: 2.9 CM
BH CV ECHO MEAS - LVOT AREA: 2.7 CM2
BH CV ECHO MEAS - LVOT DIAM: 1.84 CM
BH CV ECHO MEAS - LVPWD: 0.91 CM
BH CV ECHO MEAS - MED PEAK E' VEL: 4 CM/SEC
BH CV ECHO MEAS - MV A DUR: 0.12 SEC
BH CV ECHO MEAS - MV A MAX VEL: 112.2 CM/SEC
BH CV ECHO MEAS - MV DEC SLOPE: 280.1 CM/SEC2
BH CV ECHO MEAS - MV DEC TIME: 0.22 SEC
BH CV ECHO MEAS - MV E MAX VEL: 92.1 CM/SEC
BH CV ECHO MEAS - MV E/A: 0.82
BH CV ECHO MEAS - MV MAX PG: 3.4 MMHG
BH CV ECHO MEAS - MV MEAN PG: 1.52 MMHG
BH CV ECHO MEAS - MV P1/2T: 85.1 MSEC
BH CV ECHO MEAS - MV V2 VTI: 20.7 CM
BH CV ECHO MEAS - MVA(P1/2T): 2.6 CM2
BH CV ECHO MEAS - MVA(VTI): 3.9 CM2
BH CV ECHO MEAS - PA ACC TIME: 0.15 SEC
BH CV ECHO MEAS - PA V2 MAX: 81.3 CM/SEC
BH CV ECHO MEAS - PULM A REVS DUR: 0.11 SEC
BH CV ECHO MEAS - PULM A REVS VEL: 38.2 CM/SEC
BH CV ECHO MEAS - PULM DIAS VEL: 46.7 CM/SEC
BH CV ECHO MEAS - PULM S/D: 1.13
BH CV ECHO MEAS - PULM SYS VEL: 52.8 CM/SEC
BH CV ECHO MEAS - RAP SYSTOLE: 3 MMHG
BH CV ECHO MEAS - RV MAX PG: 2.42 MMHG
BH CV ECHO MEAS - RV V1 MAX: 77.8 CM/SEC
BH CV ECHO MEAS - RV V1 VTI: 17.9 CM
BH CV ECHO MEAS - RVSP: 42 MMHG
BH CV ECHO MEAS - SUP REN AO DIAM: 1.7 CM
BH CV ECHO MEAS - SV(LVOT): 80.9 ML
BH CV ECHO MEAS - SV(MOD-SP2): 46 ML
BH CV ECHO MEAS - SV(MOD-SP4): 41 ML
BH CV ECHO MEAS - SVI(LVOT): 53.7 ML/M2
BH CV ECHO MEAS - SVI(MOD-SP2): 30.6 ML/M2
BH CV ECHO MEAS - SVI(MOD-SP4): 27.2 ML/M2
BH CV ECHO MEAS - TAPSE (>1.6): 2.04 CM
BH CV ECHO MEAS - TR MAX PG: 39.7 MMHG
BH CV ECHO MEAS - TR MAX VEL: 315.1 CM/SEC
BH CV ECHO MEASUREMENTS AVERAGE E/E' RATIO: 18.8
BH CV ECHO SHUNT ASSESSMENT PERFORMED (HIDDEN SCRIPTING): 1
BH CV XLRA - RV BASE: 3.1 CM
BH CV XLRA - RV LENGTH: 6.3 CM
BH CV XLRA - RV MID: 1.65 CM
BH CV XLRA - TDI S': 11.2 CM/SEC
BILIRUB UR QL STRIP: NEGATIVE
BUN SERPL-MCNC: 20 MG/DL (ref 8–23)
BUN/CREAT SERPL: 23.3 (ref 7–25)
CALCIUM SPEC-SCNC: 8.8 MG/DL (ref 8.6–10.5)
CHLORIDE SERPL-SCNC: 101 MMOL/L (ref 98–107)
CHOLEST SERPL-MCNC: 178 MG/DL (ref 0–200)
CLARITY UR: CLEAR
CO2 SERPL-SCNC: 25.3 MMOL/L (ref 22–29)
COLOR UR: YELLOW
CREAT SERPL-MCNC: 0.86 MG/DL (ref 0.57–1)
DEPRECATED RDW RBC AUTO: 42.8 FL (ref 37–54)
EGFRCR SERPLBLD CKD-EPI 2021: 65.1 ML/MIN/1.73
ERYTHROCYTE [DISTWIDTH] IN BLOOD BY AUTOMATED COUNT: 13.1 % (ref 12.3–15.4)
GLUCOSE SERPL-MCNC: 87 MG/DL (ref 65–99)
GLUCOSE UR STRIP-MCNC: NEGATIVE MG/DL
HBA1C MFR BLD: 5.2 % (ref 4.8–5.6)
HCT VFR BLD AUTO: 32.2 % (ref 34–46.6)
HDLC SERPL-MCNC: 55 MG/DL (ref 40–60)
HGB BLD-MCNC: 10.4 G/DL (ref 12–15.9)
HGB UR QL STRIP.AUTO: NEGATIVE
KETONES UR QL STRIP: ABNORMAL
LDLC SERPL CALC-MCNC: 105 MG/DL (ref 0–100)
LDLC/HDLC SERPL: 1.88 {RATIO}
LEUKOCYTE ESTERASE UR QL STRIP.AUTO: NEGATIVE
LV EF BIPLANE MOD: 69 %
MCH RBC QN AUTO: 28.7 PG (ref 26.6–33)
MCHC RBC AUTO-ENTMCNC: 32.3 G/DL (ref 31.5–35.7)
MCV RBC AUTO: 89 FL (ref 79–97)
NITRITE UR QL STRIP: NEGATIVE
PH UR STRIP.AUTO: 7 [PH] (ref 5–8)
PLATELET # BLD AUTO: 230 10*3/MM3 (ref 140–450)
PMV BLD AUTO: 11 FL (ref 6–12)
POTASSIUM SERPL-SCNC: 4.1 MMOL/L (ref 3.5–5.2)
PROT UR QL STRIP: NEGATIVE
QT INTERVAL: 409 MS
QTC INTERVAL: 435 MS
RBC # BLD AUTO: 3.62 10*6/MM3 (ref 3.77–5.28)
SINUS: 3.1 CM
SODIUM SERPL-SCNC: 135 MMOL/L (ref 136–145)
SP GR UR STRIP: >1.03 (ref 1–1.03)
STJ: 2.9 CM
TRIGL SERPL-MCNC: 97 MG/DL (ref 0–150)
TSH SERPL DL<=0.05 MIU/L-ACNC: 2.04 UIU/ML (ref 0.27–4.2)
UROBILINOGEN UR QL STRIP: ABNORMAL
VLDLC SERPL-MCNC: 18 MG/DL (ref 5–40)
WBC NRBC COR # BLD AUTO: 4.58 10*3/MM3 (ref 3.4–10.8)

## 2025-05-08 PROCEDURE — 93306 TTE W/DOPPLER COMPLETE: CPT

## 2025-05-08 PROCEDURE — 80061 LIPID PANEL: CPT | Performed by: PHYSICIAN ASSISTANT

## 2025-05-08 PROCEDURE — 99214 OFFICE O/P EST MOD 30 MIN: CPT | Performed by: STUDENT IN AN ORGANIZED HEALTH CARE EDUCATION/TRAINING PROGRAM

## 2025-05-08 PROCEDURE — 85027 COMPLETE CBC AUTOMATED: CPT | Performed by: PHYSICIAN ASSISTANT

## 2025-05-08 PROCEDURE — G0378 HOSPITAL OBSERVATION PER HR: HCPCS

## 2025-05-08 PROCEDURE — 80048 BASIC METABOLIC PNL TOTAL CA: CPT | Performed by: PHYSICIAN ASSISTANT

## 2025-05-08 PROCEDURE — 93306 TTE W/DOPPLER COMPLETE: CPT | Performed by: INTERNAL MEDICINE

## 2025-05-08 PROCEDURE — 97530 THERAPEUTIC ACTIVITIES: CPT

## 2025-05-08 PROCEDURE — 70553 MRI BRAIN STEM W/O & W/DYE: CPT

## 2025-05-08 PROCEDURE — 25510000001 PERFLUTREN 6.52 MG/ML SUSPENSION 2 ML VIAL: Performed by: PHYSICIAN ASSISTANT

## 2025-05-08 PROCEDURE — 83036 HEMOGLOBIN GLYCOSYLATED A1C: CPT | Performed by: PHYSICIAN ASSISTANT

## 2025-05-08 PROCEDURE — A9577 INJ MULTIHANCE: HCPCS | Performed by: EMERGENCY MEDICINE

## 2025-05-08 PROCEDURE — 25510000002 GADOBENATE DIMEGLUMINE 529 MG/ML SOLUTION: Performed by: EMERGENCY MEDICINE

## 2025-05-08 PROCEDURE — 84443 ASSAY THYROID STIM HORMONE: CPT | Performed by: PHYSICIAN ASSISTANT

## 2025-05-08 PROCEDURE — 97161 PT EVAL LOW COMPLEX 20 MIN: CPT

## 2025-05-08 PROCEDURE — 97166 OT EVAL MOD COMPLEX 45 MIN: CPT

## 2025-05-08 RX ORDER — ATORVASTATIN CALCIUM 80 MG/1
80 TABLET, FILM COATED ORAL DAILY
Qty: 90 TABLET | Refills: 0 | Status: SHIPPED | OUTPATIENT
Start: 2025-05-08

## 2025-05-08 RX ORDER — LORAZEPAM 1 MG/1
1 TABLET ORAL ONCE
Status: COMPLETED | OUTPATIENT
Start: 2025-05-08 | End: 2025-05-08

## 2025-05-08 RX ORDER — ASPIRIN 81 MG/1
81 TABLET, CHEWABLE ORAL DAILY
Status: DISCONTINUED | OUTPATIENT
Start: 2025-05-08 | End: 2025-05-08 | Stop reason: HOSPADM

## 2025-05-08 RX ORDER — ATORVASTATIN CALCIUM 80 MG/1
80 TABLET, FILM COATED ORAL NIGHTLY
Status: DISCONTINUED | OUTPATIENT
Start: 2025-05-08 | End: 2025-05-08 | Stop reason: HOSPADM

## 2025-05-08 RX ORDER — ASPIRIN 81 MG/1
81 TABLET, CHEWABLE ORAL DAILY
Qty: 30 TABLET | Refills: 0 | Status: SHIPPED | OUTPATIENT
Start: 2025-05-09 | End: 2025-05-08

## 2025-05-08 RX ORDER — ASPIRIN 81 MG/1
81 TABLET ORAL DAILY
Qty: 30 TABLET | Refills: 0 | Status: SHIPPED | OUTPATIENT
Start: 2025-05-08

## 2025-05-08 RX ORDER — LORAZEPAM 1 MG/1
1 TABLET ORAL ONCE AS NEEDED
Status: COMPLETED | OUTPATIENT
Start: 2025-05-08 | End: 2025-05-08

## 2025-05-08 RX ORDER — ATORVASTATIN CALCIUM 80 MG/1
80 TABLET, FILM COATED ORAL NIGHTLY
Qty: 90 TABLET | Refills: 0 | Status: SHIPPED | OUTPATIENT
Start: 2025-05-08 | End: 2025-05-08

## 2025-05-08 RX ADMIN — DICYCLOMINE HYDROCHLORIDE 20 MG: 10 CAPSULE ORAL at 08:31

## 2025-05-08 RX ADMIN — ASPIRIN 81 MG CHEWABLE TABLET 81 MG: 81 TABLET CHEWABLE at 13:28

## 2025-05-08 RX ADMIN — LORAZEPAM 1 MG: 1 TABLET ORAL at 11:01

## 2025-05-08 RX ADMIN — LORAZEPAM 1 MG: 1 TABLET ORAL at 09:06

## 2025-05-08 RX ADMIN — PANTOPRAZOLE SODIUM 40 MG: 40 TABLET, DELAYED RELEASE ORAL at 08:31

## 2025-05-08 RX ADMIN — GADOBENATE DIMEGLUMINE 10 ML: 529 INJECTION, SOLUTION INTRAVENOUS at 11:38

## 2025-05-08 RX ADMIN — DICYCLOMINE HYDROCHLORIDE 20 MG: 10 CAPSULE ORAL at 12:13

## 2025-05-08 RX ADMIN — PERFLUTREN 2 ML: 6.52 INJECTION, SUSPENSION INTRAVENOUS at 10:20

## 2025-05-08 NOTE — DISCHARGE PLACEMENT REQUEST
"Veronica Babb (89 y.o. Female)       Date of Birth   1935    Social Security Number       Address   [address] [place] [address]    Home Phone   [phone]    MRN   [id number]       Buddhism   Synagogue    Marital Status                               Admission Date   5/7/2025    Admission Type   Emergency    Admitting Provider   Marlene Li MD    Attending Provider   Miranda Millard MD    Department, Room/Bed   [facility] OBSERVATION, 106/1       Discharge Date       Discharge Disposition   Home or Self Care    Discharge Destination                                 Attending Provider: Miranda Millard MD    Allergies: No Known Allergies    Isolation: None   Infection: None   Code Status: CPR    Ht: 157.5 cm (62\")   Wt: 51.7 kg (114 lb)    Admission Cmt: None   Principal Problem: Vision loss, right eye [H54.61]                   Active Insurance as of 5/7/2025       Primary Coverage       Payor Plan Insurance Group Employer/Plan Group    MEDICARE MEDICARE A & B        Payor Plan Address Payor Plan Phone Number Payor Plan Fax Number Effective Dates    [address] [phone]  2/1/2002 - None Entered    [facility] [id number]         Subscriber Name Subscriber Birth Date Member ID       VERONICA BABB 1935 [id number]               Secondary Coverage       Payor Plan Insurance Group Employer/Plan Group    AARP MC SUP AARP HEALTH CARE OPTIONS        Payor Plan Address Payor Plan Phone Number Payor Plan Fax Number Effective Dates    [facility] [phone]  1/1/2016 - None Entered    [address]       [facility] [address]         Subscriber Name Subscriber Birth Date Member ID       VERONICA BABB 1935 [id number]                     Emergency Contacts        (Rel.) Home Phone Work Phone Mobile Phone    BRITTANICINTHIA \"DEZ\" (Son) -- -- [phone]    Marcia Hu (Daughter) [phone] -- [phone]    Joesph Jackson (Son) [phone] " - [phone]

## 2025-05-08 NOTE — CASE MANAGEMENT/SOCIAL WORK
Discharge Planning Assessment  [facility]     Patient Name: Veronica Babb  MRN: [id number]  Today's Date: 5/8/2025    Admit Date: 5/7/2025    Plan: Patient plans to return home upon discharge, where she lives alone in private residence. Family to provide transportation. Home health referrals sent per patient/family request, with no preference for agency. [facility] CCP to follow for acceptance.   Discharge Needs Assessment       Row Name 05/08/25 1433       Living Environment    People in Home alone    Current Living Arrangements home    Potentially Unsafe Housing Conditions none    Primary Care Provided by self;child(adolfo)    Provides Primary Care For no one, unable/limited ability to care for self    Family Caregiver if Needed child(adolfo), adult    Quality of Family Relationships helpful;involved;supportive    Able to Return to Prior Arrangements yes    Living Arrangement Comments Patient resides alone in private home. No vocalized environmental concerns.       Resource/Environmental Concerns    Resource/Environmental Concerns none    Transportation Concerns none  Family provides transportation, as patient no longer drives.       Transition Planning    Patient/Family Anticipates Transition to home with help/services;home with family    Patient/Family Anticipated Services at Transition home health care    Transportation Anticipated family or friend will provide       Discharge Needs Assessment    Equipment Currently Used at Home cane, quad    Concerns to be Addressed discharge planning  HH referral sent, no preference. Agreeable to mass referral.    Anticipated Changes Related to Illness none    Equipment Needed After Discharge none    Outpatient/Agency/Support Group Needs homecare agency  HH referral sent, no preference. Agreeable to mass referral.    Discharge Facility/Level of Care Needs home with home health    Offered/Gave Vendor List yes    Patient's Choice of  Community Agency(s) No preference for HH agency. Agreeable to mass referral.    Current Discharge Risk lives alone                   Discharge Plan       Row Name 05/08/25 1435       Plan    Plan Patient plans to return home upon discharge, where she lives alone in private residence. Family to provide transportation. Home health referrals sent per patient/family request, with no preference for agency. [facility] CCP to follow for acceptance.    Patient/Family in Agreement with Plan yes    Plan Comments Patient plans to return home upon discharge, where she lives in private home with no vocalized environmental concerns. At baseline, she is MOD I with use of quad-cane. No recent falls. She does not drive, but has reliable transport from family. Son at bedside reports he can provide discharge transportation. Patient is agreeable to home health for continued physical therapy, per PT evaluation recommendations, but has no preference for agency. She has completed HH in the past, but cannot recall which agency it was. Patient uses [facility] on [facility], but she is currently enrolled in [facility]. I will send referrals to HH (PT) to [facility], [facility], [facility]. Informed patient and son at bedside that accepting agency will reach out to them to set up first home visit and they verbalize understanding. No further needs anticipated.                    Expected Discharge Date and Time       Expected Discharge Date Expected Discharge Time    May 8, 2025            Demographic Summary       Row Name 05/08/25 1416       General Information    Admission Type observation    Arrived From home    Required Notices Provided Observation Status Notice  Verified GRIFFIN on chart.    Referral Source admission list    Reason for Consult discharge planning    Preferred Language English    General Information Comments Facesheet verified. Role of CCP explained. Appropriate  PPE worn at all times.       Contact Information    Permission Granted to Share Info With family/designee  Son at bedside.    Contact Information Obtained for lay caregiver                   Functional Status       Row Name 05/08/25 1432       Functional Status, IADL    Medications assistive equipment and person    Meal Preparation assistive equipment and person    Housekeeping assistive equipment and person    Laundry assistive equipment and person    Shopping assistive equipment and person    IADL Comments Patient reports MOD I at baseline with use of quad-cane. Son assists as needed.       Mental Status    General Appearance WDL WDL                   Psychosocial       Row Name 05/08/25 1432       Behavior WDL    Behavior WDL WDL       Emotion Mood WDL    Emotion/Mood/Affect WDL WDL       Speech WDL    Speech WDL WDL       Perceptual State WDL    Perceptual State WDL WDL       Thought Process WDL    Thought Process WDL WDL       Intellectual Performance WDL    Intellectual Performance WDL WDL    Level of Consciousness Alert       Coping/Stress    Major Change/Loss/Stressor none    Patient Personal Strengths expressive of emotions;expressive of needs;strong support system    Sources of Support adult child(adolfo)       Developmental Stage (Eriksson's Stages of Development)    Developmental Stage Stage 8 (65 years-death/Late Adulthood) Integrity vs. Despair                   Abuse/Neglect       Row Name 05/08/25 1432       Personal Safety    Physical Signs of Abuse Present no                   Legal    No documentation.                  Substance Abuse    No documentation.                  Patient Forms    No documentation.                     Tito Hurtado RN

## 2025-05-08 NOTE — THERAPY EVALUATION
"Patient Name: Veronica Babb  : 1935    MRN: [id number]                              Today's Date: 2025       Admit Date: 2025    Visit Dx:     ICD-10-CM ICD-9-CM   1. Central retinal vein occlusion of right eye, unspecified complication status  H34.8112 362.35   2. Uncontrolled hypertension  I10 401.9   3. Pulmonary mass  R91.8 786.6   4. Vision loss, right eye  H54.61 369.8   5. Cervical disc disease  M50.90 722.91   6. Osteoarthritis of knee, unspecified laterality, unspecified osteoarthritis type  M17.9 715.36   7. Decreased activities of daily living (ADL)  Z78.9 V49.89     Patient Active Problem List   Diagnosis    Primary insomnia    Lumbar disc disease    Trapezius muscle spasm    Knee injury    Primary osteoarthritis of right knee    Status post total hip replacement, right    Essential hypertension    Gastroesophageal reflux disease with esophagitis    Migraine without aura    Overactive bladder    Cervical disc disease    Irritable bowel syndrome with constipation    Hyponatremia    Reactive depression    OA (osteoarthritis) of knee    Primary osteoarthritis of right hip    Primary osteoarthritis of left hip    NSAID long-term use    Heme positive stool    Anemia    Encounter for long-term (current) use of non-steroidal anti-inflammatories    Encounter for screening for malignant neoplasm of colon    Vision loss, right eye    Pulmonary mass     Past Medical History:   Diagnosis Date    COVID-19 vaccine series completed     2021    Hypertension     IBS (irritable bowel syndrome)     Insomnia     Limited joint range of motion (ROM)     RIGHT KNEE    Migraines     Murmur     STATES WAS TOLD THIS WAS \"SLIGHT\"    Right knee pain     Weakness     RIGHT KNEE     Past Surgical History:   Procedure Laterality Date    BREAST AUGMENTATION      CATARACT EXTRACTION, BILATERAL      COLONOSCOPY  2011    HIP ARTHROPLASTY Right     HYSTERECTOMY      TOTAL KNEE ARTHROPLASTY Right 2021    " Procedure: TOTAL KNEE ARTHROPLASTY;  Surgeon: Jian Hernandez MD;  Location: [facility] OR;  Service: Orthopedics;  Laterality: Right;      General Information       Row Name 05/08/25 1449          OT Time and Intention    Document Type evaluation  -ES     Mode of Treatment individual therapy;occupational therapy  -ES     Patient Effort adequate  -ES       Row Name 05/08/25 1449          General Information    Patient Profile Reviewed yes  -ES     Prior Level of Function independent:;ADL's;all household mobility  Patient son reports patient is independent with ADLs, uses cane for functional mobility. Walk in shower, standing shower completion. [place] in stance. No home O2 use.  -ES     Existing Precautions/Restrictions fall  -ES     Barriers to Rehab cognitive status  -ES       Row Name 05/08/25 1449          Living Environment    Current Living Arrangements home  -ES     People in Home alone  -ES       Row Name 05/08/25 1449          Cognition    Orientation Status (Cognition) oriented to;person  Patient is oriented to self only. Patient is unable to answer orientation questions or assist with prior level reporting. Patient reports her son is her . Son assist with prior level reporting secondary to patient cognition.  -ES       Row Name 05/08/25 1449          Safety Issues/Impairments Affecting Functional Mobility    Safety Issues Affecting Function (Mobility) awareness of need for assistance;impulsivity;sequencing abilities;judgment;insight into deficits/self-awareness  -ES     Impairments Affecting Function (Mobility) balance;endurance/activity tolerance;strength;postural/trunk control;cognition  -ES               User Key  (r) = Recorded By, (t) = Taken By, (c) = Cosigned By      Initials Name Provider Type    ES Radha Sampson, ANURAG/L, CSRS Occupational Therapist                     Mobility/ADL's       Row Name 05/08/25 1459          Bed Mobility    Bed Mobility supine-sit;sit-supine  -ES     Supine-Sit  [place] (Bed Mobility) minimum assist (75% patient effort);1 person assist  -ES     Sit-Supine [place] (Bed Mobility) contact guard;1 person assist  -ES       Row Name 05/08/25 1452          Transfers    Transfers sit-stand transfer  -ES       Row Name 05/08/25 1452          Sit-Stand Transfer    Sit-Stand [place] (Transfers) minimum assist (75% patient effort);1 person assist  -ES     Assistive Device (Sit-Stand Transfers) walker, front-wheeled  -ES       Row Name 05/08/25 1452          Functional Mobility    Functional Mobility- Ind. Level minimum assist (75% patient effort);1 person  -ES     Functional Mobility- Device walker, front-wheeled  -ES     Functional Mobility- Comment attempt to complete funcitonal mobility with use of cane, however patient is unable to advance LEs secondary to reports of weakness. Provided patient with RW, able to complete short distance mobility in room with slow, short stepping pattern not suitable for safe household distance mobility  -ES       Row Name 05/08/25 1452          Activities of Daily Living    BADL Assessment/Intervention lower body dressing  -ES       Row Name 05/08/25 1452          Lower Body Dressing Assessment/Training    [place] Level (Lower Body Dressing) lower body dressing skills;don;socks;maximum assist (25% patient effort)  -ES     Position (Lower Body Dressing) edge of bed sitting  -ES     Comment, (Lower Body Dressing) patient demonstrates difficulty sequencing lower body dressing task, requiring increased time and assist.  -ES               User Key  (r) = Recorded By, (t) = Taken By, (c) = Cosigned By      Initials Name Provider Type    ES Radha Sampson, OTR/L, CSRS Occupational Therapist                   Obj/Interventions       Row Name 05/08/25 1454          Sensory Assessment (Somatosensory)    Sensory Assessment (Somatosensory) unable/difficult to assess  -ES     Sensory Assessment difficult to assess secondary to patient cognitive  status. Patient reports sensation intact to light and heavy touch BUE, however patient may demonstrate questionable reporting  -ES       Row Name 05/08/25 1454          Vision Assessment/Intervention    Visual Impairment/Limitations unable/difficult to assess  -ES     Vision Assessment Comment The patient's ability to track an object is impaired, with inconsistent smooth pursuit movements. Frequent interruptions in eye contact during tracking significantly limit the accuracy of the assessment  -ES       Row Name 05/08/25 1454          Range of Motion Comprehensive    General Range of Motion bilateral upper extremity ROM WNL  -ES       Row Name 05/08/25 1454          Strength Comprehensive (MMT)    General Manual Muscle Testing (MMT) Assessment upper extremity strength deficits identified;lower extremity strength deficits identified  -ES     Comment, General Manual Muscle Testing (MMT) Assessment symmetrical weakness  -ES       Row Name 05/08/25 1457          Upper Extremity (Manual Muscle Testing)    Comment, MMT: Upper Extremity BUEs 4-/5  -ES       Row Name 05/08/25 1457          Motor Skills    Motor Skills coordination;functional endurance  -ES     Coordination WFL;bilateral;upper extremity  -ES     Functional Endurance fair/fair minus  -ES       Row Name 05/08/25 1451          Balance    Balance Assessment sitting static balance;standing dynamic balance;sitting dynamic balance  -ES     Static Sitting Balance standby assist  -ES     Dynamic Sitting Balance contact guard  -ES     Position, Sitting Balance unsupported;sitting edge of bed  -ES     Dynamic Standing Balance minimal assist  -ES     Position/Device Used, Standing Balance supported;walker, front-wheeled  -ES               User Key  (r) = Recorded By, (t) = Taken By, (c) = Cosigned By      Initials Name Provider Type    Radha Mauricio, OTR/L, CSRS Occupational Therapist                   Goals/Plan       Row Name 05/08/25 1028          Bed Mobility  Goal 1 (OT)    Activity/Assistive Device (Bed Mobility Goal 1, OT) bed mobility activities, all  -ES     [place] Level/Cues Needed (Bed Mobility Goal 1, OT) independent  -ES     Time Frame (Bed Mobility Goal 1, OT) short term goal (STG);2 weeks  -ES     Progress/Outcomes (Bed Mobility Goal 1, OT) new goal  -ES       Row Name 05/08/25 1507          Transfer Goal 1 (OT)    Activity/Assistive Device (Transfer Goal 1, OT) transfers, all  -ES     [place] Level/Cues Needed (Transfer Goal 1, OT) modified independence  -ES     Time Frame (Transfer Goal 1, OT) short term goal (STG);2 weeks  -ES     Progress/Outcome (Transfer Goal 1, OT) new goal  -ES       Row Name 05/08/25 1507          Bathing Goal 1 (OT)    Activity/Device (Bathing Goal 1, OT) bathing skills, all  -ES     [place] Level/Cues Needed (Bathing Goal 1, OT) independent  -ES     Time Frame (Bathing Goal 1, OT) short term goal (STG);2 weeks  -ES     Progress/Outcomes (Bathing Goal 1, OT) new goal  -ES       Row Name 05/08/25 1507          Dressing Goal 1 (OT)    Activity/Device (Dressing Goal 1, OT) dressing skills, all  -ES     [place]/Cues Needed (Dressing Goal 1, OT) independent  -ES     Time Frame (Dressing Goal 1, OT) short term goal (STG);2 weeks  -ES     Progress/Outcome (Dressing Goal 1, OT) new goal  -ES       Row Name 05/08/25 1507          Toileting Goal 1 (OT)    Activity/Device (Toileting Goal 1, OT) toileting skills, all  -ES     [place] Level/Cues Needed (Toileting Goal 1, OT) independent  -ES     Time Frame (Toileting Goal 1, OT) short term goal (STG);2 weeks  -ES     Progress/Outcome (Toileting Goal 1, OT) new goal  -ES       Row Name 05/08/25 1507          Grooming Goal 1 (OT)    Activity/Device (Grooming Goal 1, OT) grooming skills, all  -ES     [place] (Grooming Goal 1, OT) independent  -ES     Time Frame (Grooming Goal 1, OT) short term goal (STG);2 weeks  -ES     Progress/Outcome (Grooming Goal 1, OT) new goal   -ES       Row Name 05/08/25 1507          Therapy Assessment/Plan (OT)    Planned Therapy Interventions (OT) activity tolerance training;BADL retraining;functional balance retraining;occupation/activity based interventions;patient/caregiver education/training;ROM/therapeutic exercise;strengthening exercise;transfer/mobility retraining  -ES               User Key  (r) = Recorded By, (t) = Taken By, (c) = Cosigned By      Initials Name Provider Type    ES Radha Sampson, OTR/L, CSRS Occupational Therapist                   Clinical Impression       Row Name 05/08/25 1459          Pain Assessment    Pretreatment Pain Rating 0/10 - no pain  -ES     Posttreatment Pain Rating 0/10 - no pain  -ES       Row Name 05/08/25 1459          Plan of Care Review    Plan of Care Reviewed With patient;son  -ES     Outcome Evaluation Patient is 89 year old female presenting to [facility] on 5/7/2025 with complaints of R eye blurred vision. MRI negative for acute abnormalities. PMH significant for HTN, IBS, osteoarthritis. At the of assessment, patient is oriented to self only and inaccurately identified her son as her spouse. Son provides prior level of function, reporting that the patient was typically independent with ADLs, utilized a cane for functional mobility, and lives alone. Currently, the patient requires min A for bed mobility and increased cueing for task completion. She also requires min A with STS transfers with the use of RW. Pt demonstrates slow gait with noticeable unsteadiness during short-distance mobility, and is unable to effectively use the cane due to difficulty advancing her lower extremities, progressed to use of RW for patient safety. Generalized confusion is present, with difficulty following commands throughout the session and questionable safety awareness. Patient exhibits deficits in strength, tolerance, balance, transfers, and mobility that significantly hinder independence. Skilled OT   intervention is recommended in the acute care setting, followed by IPR at time of discharge.  -ES       Row Name 05/08/25 1459          Therapy Assessment/Plan (OT)    Rehab Potential (OT) good  -ES     Criteria for Skilled Therapeutic Interventions Met (OT) yes;meets criteria;skilled treatment is necessary  -ES     Therapy Frequency (OT) 5 times/wk  -ES       Row Name 05/08/25 1459          Therapy Plan Review/Discharge Plan (OT)    Anticipated Discharge Disposition (OT) inpatient rehabilitation facility  -ES       Row Name 05/08/25 1459          Positioning and Restraints    Pre-Treatment Position in bed  -ES     Post Treatment Position bed  -ES     In Bed fowlers;call light within reach;encouraged to call for assist  -ES               User Key  (r) = Recorded By, (t) = Taken By, (c) = Cosigned By      Initials Name Provider Type    Radha Mauricio, OTR/L, CSRS Occupational Therapist                   Outcome Measures       Row Name 05/08/25 1508          How much help from another is currently needed...    Putting on and taking off regular lower body clothing? 2  -ES     Bathing (including washing, rinsing, and drying) 2  -ES     Toileting (which includes using toilet bed pan or urinal) 2  -ES     Putting on and taking off regular upper body clothing 3  -ES     Taking care of personal grooming (such as brushing teeth) 3  -ES     Eating meals 4  -ES     AM-PAC 6 Clicks Score (OT) 16  -ES       Row Name 05/08/25 1419 05/08/25 0800       How much help from another person do you currently need...    Turning from your back to your side while in flat bed without using bedrails? 4  -SM 4  -JJ    Moving from lying on back to sitting on the side of a flat bed without bedrails? 3  -SM 4  -JJ    Moving to and from a bed to a chair (including a wheelchair)? 3  -SM 3  -JJ    Standing up from a chair using your arms (e.g., wheelchair, bedside chair)? 3  -SM 3  -JJ    Climbing 3-5 steps with a railing? 2  -SM 2  -JJ    To walk  in hospital room? 2  -SM 2  -JJ    AM-PAC 6 Clicks Score (PT) 17  -SM 18  -JJ    Highest Level of Mobility Goal 5 --> Static standing  - 6 --> Walk 10 steps or more  -J      Row Name 05/08/25 1508          Modified Moffat Scale    Pre-Stroke Modified Moffat Scale 4 - Moderately severe disability.  Unable to walk without assistance, and unable to attend to own bodily needs without assistance.  -       Row Name 05/08/25 1508 05/08/25 1419       Functional Assessment    Outcome Measure Options AM-PAC 6 Clicks Daily Activity (OT);Modified Rui  -ES AM-PAC 6 Clicks Basic Mobility (PT)  -              User Key  (r) = Recorded By, (t) = Taken By, (c) = Cosigned By      Initials Name Provider Type    Radha Mauricio, OTR/L, CSRS Occupational Therapist    Beverly Boogie, PT Physical Therapist    Diana Styles, RN Registered Nurse                      OT Recommendation and Plan  Planned Therapy Interventions (OT): activity tolerance training, BADL retraining, functional balance retraining, occupation/activity based interventions, patient/caregiver education/training, ROM/therapeutic exercise, strengthening exercise, transfer/mobility retraining  Therapy Frequency (OT): 5 times/wk  Plan of Care Review  Plan of Care Reviewed With: patient, son  Outcome Evaluation: Patient is 89 year old female presenting to [facility] on 5/7/2025 with complaints of R eye blurred vision. MRI negative for acute abnormalities. PMH significant for HTN, IBS, osteoarthritis. At the of assessment, patient is oriented to self only and inaccurately identified her son as her spouse. Son provides prior level of function, reporting that the patient was typically independent with ADLs, utilized a cane for functional mobility, and lives alone. Currently, the patient requires min A for bed mobility and increased cueing for task completion. She also requires min A with STS transfers with the use of RW. Pt demonstrates slow  gait with noticeable unsteadiness during short-distance mobility, and is unable to effectively use the cane due to difficulty advancing her lower extremities, progressed to use of RW for patient safety. Generalized confusion is present, with difficulty following commands throughout the session and questionable safety awareness. Patient exhibits deficits in strength, tolerance, balance, transfers, and mobility that significantly hinder independence. Skilled OT  intervention is recommended in the acute care setting, followed by IPR at time of discharge.     Time Calculation:   Evaluation Complexity (OT)  Review Occupational Profile/Medical/Therapy History Complexity: expanded/moderate complexity  Assessment, Occupational Performance/Identification of Deficit Complexity: 3-5 performance deficits  Clinical Decision Making Complexity (OT): detailed assessment/moderate complexity  Overall Complexity of Evaluation (OT): moderate complexity     Time Calculation- OT       Row Name 05/08/25 1510             Time Calculation- OT    OT Start Time 1305  -ES      OT Stop Time 1322  -ES      OT Time Calculation (min) 17 min  -ES      Total Timed Code Minutes- OT 10 minute(s)  -ES      OT Received On 05/08/25  -ES      OT - Next Appointment 05/09/25  -ES      OT Goal Re-Cert Due Date 05/22/25  -ES         Timed Charges    85860 - OT Therapeutic Activity Minutes 10  -ES         Untimed Charges    OT Eval/Re-eval Minutes 7  -ES         Total Minutes    Timed Charges Total Minutes 10  -ES      Untimed Charges Total Minutes 7  -ES       Total Minutes 17  -ES                User Key  (r) = Recorded By, (t) = Taken By, (c) = Cosigned By      Initials Name Provider Type    ES Radha Sampson OTR/L, CSRS Occupational Therapist                  Therapy Charges for Today       Code Description Service Date Service Provider Modifiers Qty    [id number]  OT THERAPEUTIC ACT EA 15 MIN 5/8/2025 Radha Sampson, OTR/L, CSRS GO 1    [id number]   OT EVAL MOD COMPLEXITY 2 5/8/2025 Radha Sampson, OTR/L, CSRS GO 1                 ANURAG Alvarez/L, CSRS  5/8/2025

## 2025-05-08 NOTE — PROGRESS NOTES
"DOS: 2025  NAME: Veronica Babb   : 1935  PCP: Josef Kemp III, [facility]  Chief Complaint   Patient presents with    Blurred Vision       Chief complaint: Left eye vision loss    Subjective: Patient has been seen and evaluated, no acute events overnight.  This is my first day evaluating the patient.    Notes from initial consult  89 y.o. female with significant PMH of hypertension, IBS, migraines, osteoarthritis on long-term NSAID use presents to the ED with acute loss of vision in left eye.  Patient states that vision loss was gradual over a prolonged period of time but recently became significant worse.  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  Patient tells me that she did not appreciate any blurry vision worse in the left eye did not know she had an issue with the left eye until she went to the eye doctor today.  However per chart review she has had left eye blurry vision for 1 months and worsened several days ago so she went to the eye doctor.     Objective:  Vital signs: /91 (BP Location: Left arm, Patient Position: Lying)   Pulse 85   Temp 97.7 °F (36.5 °C) (Oral)   Resp 18   Ht 160 cm (62.99\")   Wt 51.8 kg (114 lb 3.2 oz)   SpO2 96%   BMI 20.23 kg/m²    Gen: NAD, vitals reviewed  MS: A little drowsy as patient has gotten Ativan for the MRI but able to comprehend repeat and talk fluently  CN: visual acuity grossly normal, PERRL, EOMI, no facial droop, no dysarthria  Motor: 5/5 throughout upper and lower extremities, normal tone  Sensory: intact to light touch all 4 ext.    Laboratory results:  Lab Results   Component Value Date    GLUCOSE 87 2025    CALCIUM 8.8 2025     (L) 2025    K 4.1 2025    CO2 25.3 2025     2025    BUN 20 2025    CREATININE 0.86 2025    EGFRIFAFRI 100 " 01/29/2021    EGFRIFNONA 70 06/24/2021    BCR 23.3 05/08/2025    ANIONGAP 8.7 05/08/2025     Lab Results   Component Value Date    WBC 4.58 05/08/2025    HGB 10.4 (L) 05/08/2025    HCT 32.2 (L) 05/08/2025    MCV 89.0 05/08/2025     05/08/2025     Lab Results   Component Value Date     (H) 05/08/2025         Lab 05/08/25  0336   HEMOGLOBIN A1C 5.20      Vitals  Afebrile  Pulse rate 70-80  Respiratory rate 18  Systolic blood pressure 150s to 190s    Review of labs:   Sodium 135  Creatinine 0.86  Blood glucose 87  Normal LFTs    A1c 5.2  TSH 2.05      WBC 4.5  Hb 10.4 and platelets 230    Urinalysis negative    Review and interpretation of imaging:   CT head without no acute hypodensity or hypodensity    CTA head and neck no acute large vessel occlusion stenosis or atherosclerosis    MRI brain no acute diffusion restriction moderate to severe extensive small vessel disease no swan sequence change    Diagnoses:  Concern for left eye CRVO    Osteoarthritis  Irritable bowel syndrome  Concern for underlying vascular dementia    Plan  Aspirin 81 and Lipitor 80 mg daily  TTE results pending    No further workup anticipated from neurology patient can be discharged home.    Patient should not be driving if she decides to try if she needs to get an outpatient occupational driving evaluation.    Patient will see me in clinic in 2 months.      MDM   Reviewed: Previous charts, nursing notes and vitals   Reviewed: Previous labs and CT CTA/MRI scan    Interpretation: Labs and CT/CTA/MRI scan   Total time providing care is :30 minutes. This excluded time spent performing separately reportable procedures and services  Consults :Neurology/Stroke    Please note that portions of this note were completed with a voice recognition program.     Robel Grover MD  Neuro Hospitalist /Vascular Neurology.

## 2025-05-08 NOTE — PLAN OF CARE
Goal Outcome Evaluation:         Here for blurry vision and retinal occlusion seen by ophthalmology. MRI pending. Pt alert and oriented x 4, ambulatory with a cane and hard of hearing. Placed on 2L nc for O2 sat in the 70s and 80s while asleep. Permissive htn x 24 hrs per neurology (220/120). No new concerns or symptoms overnight. Awaiting MRI and Neurology in the morning.

## 2025-05-08 NOTE — OUTREACH NOTE
Prep Survey      Flowsheet Row Responses   [facility] patient discharged from? [place]   Is LACE score < 7 ? Yes   Eligibility [facility]   Date of Admission 05/07/25   Date of Discharge 05/08/25   Discharge Disposition Home or Self Care   Discharge diagnosis Vision loss, right eye, Incidental finding of lung mass   Does the patient have one of the following disease processes/diagnoses(primary or secondary)? Other   Does the patient have Home health ordered? Yes   What is the Home health agency?  [facility]    Is there a DME ordered? No   Prep survey completed? Yes            Suzi WOLF - Registered Nurse

## 2025-05-08 NOTE — CASE MANAGEMENT/SOCIAL WORK
Discharge Planning Assessment  [facility]     Patient Name: Veronica Babb  MRN: [id number]  Today's Date: 5/8/2025    Admit Date: 5/7/2025    Plan: Patient plans to return home upon discharge, where she lives alone in private residence. Family to provide transportation. Home health referrals sent per patient/family request, with no preference for agency. [facility] CCP to follow for acceptance.   Discharge Needs Assessment       Row Name 05/08/25 1433       Living Environment    People in Home alone    Current Living Arrangements home    Potentially Unsafe Housing Conditions none    Primary Care Provided by self;child(adolfo)    Provides Primary Care For no one, unable/limited ability to care for self    Family Caregiver if Needed child(adolfo), adult    Quality of Family Relationships helpful;involved;supportive    Able to Return to Prior Arrangements yes    Living Arrangement Comments Patient resides alone in private home. No vocalized environmental concerns.       Resource/Environmental Concerns    Resource/Environmental Concerns none    Transportation Concerns none  Family provides transportation, as patient no longer drives.       Transition Planning    Patient/Family Anticipates Transition to home with help/services;home with family    Patient/Family Anticipated Services at Transition home health care    Transportation Anticipated family or friend will provide       Discharge Needs Assessment    Equipment Currently Used at Home cane, quad    Concerns to be Addressed discharge planning  HH referral sent, no preference. Agreeable to mass referral.    Anticipated Changes Related to Illness none    Equipment Needed After Discharge none    Outpatient/Agency/Support Group Needs homecare agency  HH referral sent, no preference. Agreeable to mass referral.    Discharge Facility/Level of Care Needs home with home health    Offered/Gave Vendor List yes    Patient's Choice of  Community Agency(s) No preference for HH agency. Agreeable to mass referral.    Current Discharge Risk lives alone                   Discharge Plan       Row Name 05/08/25 3578       Plan    Plan Patient plans to return home upon discharge, where she lives alone in private residence. Family to provide transportation. Home health referrals sent per patient/family request, with no preference for agency. Referrals sent to [facility], [facility], and [facility]. CCP to follow for acceptance.    Patient/Family in Agreement with Plan yes    Plan Comments Patient plans to return home upon discharge, where she lives in private home with no vocalized environmental concerns. At baseline, she is MOD I with use of quad-cane. No recent falls. She does not drive, but has reliable transport from family. Son at bedside reports he can provide discharge transportation. Patient is agreeable to home health for continued physical therapy, per PT evaluation recommendations, but has no preference for agency. She has completed HH in the past, but cannot recall which agency it was. Patient uses [facility] on [address], but she is currently enrolled in Meds to [facility]. I will send referrals to HH (PT) to [facility], [facility], and [facility]. Informed patient and son at bedside that accepting agency will reach out to them to set up first home visit and they verbalize understanding. No further needs anticipated.                  Continued Care and Services - Admitted Since 5/7/2025       Home Medical Care Patient indicates having no preference.      Service Provider Request Status Services Address Phone Fax Patient Preferred    [facility] Pending - Request Sent -- [address], [address], [place] [id number] [phone] --    [facility] Pending - Request Sent -- [address], [facility] [id number] [phone] --     [facility] Pending - Request Sent -- [address], [address], [facility] [address] [phone] --                  Expected Discharge Date and Time       Expected Discharge Date Expected Discharge Time    May 8, 2025            Demographic Summary       Row Name 05/08/25 1416       General Information    Admission Type observation    Arrived From home    Required Notices Provided Observation Status Notice  Verified GRIFFIN on chart.    Referral Source admission list    Reason for Consult discharge planning    Preferred Language English    General Information Comments Facesheet verified. Role of CCP explained. Appropriate PPE worn at all times.       Contact Information    Permission Granted to Share Info With family/designee  Son at bedside.    Contact Information Obtained for lay caregiver                   Functional Status       Row Name 05/08/25 1432       Functional Status, IADL    Medications assistive equipment and person    Meal Preparation assistive equipment and person    Housekeeping assistive equipment and person    Laundry assistive equipment and person    Shopping assistive equipment and person    IADL Comments Patient reports MOD I at baseline with use of quad-cane. Son assists as needed.       Mental Status    General Appearance WDL WDL                   Psychosocial       Row Name 05/08/25 1432       Behavior WDL    Behavior WDL WDL       Emotion Mood WDL    Emotion/Mood/Affect WDL WDL       Speech WDL    Speech WDL WDL       Perceptual State WDL    Perceptual State WDL WDL       Thought Process WDL    Thought Process WDL WDL       Intellectual Performance WDL    Intellectual Performance WDL WDL    Level of Consciousness Alert       Coping/Stress    Major Change/Loss/Stressor none    Patient Personal Strengths expressive of emotions;expressive of needs;strong support system    Sources of Support adult child(adolfo)       Developmental Stage (Ersebastianon's Stages of  Development)    Developmental Stage Stage 8 (65 years-death/Late Adulthood) Integrity vs. Despair                   Abuse/Neglect       Row Name 05/08/25 1432       Personal Safety    Physical Signs of Abuse Present no                   Legal    No documentation.                  Substance Abuse    No documentation.                  Patient Forms    No documentation.                     Tito Hurtado RN

## 2025-05-08 NOTE — SIGNIFICANT NOTE
05/08/25 0950   OTHER   Discipline physical therapist   Rehab Time/Intention   Session Not Performed patient unavailable for evaluation  (multiple AM tests. will follow up as time allows. encourage ambulation as gabe w cane bedside)

## 2025-05-08 NOTE — THERAPY EVALUATION
"Patient Name: Veronica Babb  : 1935    MRN: [id number]                              Today's Date: 2025       Admit Date: 2025    Visit Dx:     ICD-10-CM ICD-9-CM   1. Central retinal vein occlusion of right eye, unspecified complication status  H34.8112 362.35   2. Uncontrolled hypertension  I10 401.9   3. Pulmonary mass  R91.8 786.6   4. Vision loss, right eye  H54.61 369.8   5. Cervical disc disease  M50.90 722.91   6. Osteoarthritis of knee, unspecified laterality, unspecified osteoarthritis type  M17.9 715.36     Patient Active Problem List   Diagnosis    Primary insomnia    Lumbar disc disease    Trapezius muscle spasm    Knee injury    Primary osteoarthritis of right knee    Status post total hip replacement, right    Essential hypertension    Gastroesophageal reflux disease with esophagitis    Migraine without aura    Overactive bladder    Cervical disc disease    Irritable bowel syndrome with constipation    Hyponatremia    Reactive depression    OA (osteoarthritis) of knee    Primary osteoarthritis of right hip    Primary osteoarthritis of left hip    NSAID long-term use    Heme positive stool    Anemia    Encounter for long-term (current) use of non-steroidal anti-inflammatories    Encounter for screening for malignant neoplasm of colon    Vision loss, right eye    Pulmonary mass     Past Medical History:   Diagnosis Date    COVID-19 vaccine series completed     2021    Hypertension     IBS (irritable bowel syndrome)     Insomnia     Limited joint range of motion (ROM)     RIGHT KNEE    Migraines     Murmur     STATES WAS TOLD THIS WAS \"SLIGHT\"    Right knee pain     Weakness     RIGHT KNEE     Past Surgical History:   Procedure Laterality Date    BREAST AUGMENTATION      CATARACT EXTRACTION, BILATERAL      COLONOSCOPY  2011    HIP ARTHROPLASTY Right     HYSTERECTOMY      TOTAL KNEE ARTHROPLASTY Right 2021    Procedure: TOTAL KNEE ARTHROPLASTY;  Surgeon: Jian Hernandez, " MD;  Location[facility] MAIN OR;  Service: Orthopedics;  Laterality: Right;      General Information       Row Name 05/08/25 1405          Physical Therapy Time and Intention    Document Type evaluation  -     Mode of Treatment individual therapy;physical therapy  -       Row Name 05/08/25 1405          General Information    Patient Profile Reviewed yes  -     Prior Level of Function independent:  -     Existing Precautions/Restrictions fall  -       Row Name 05/08/25 1405          Living Environment    Current Living Arrangements home  -     People in Home alone  -       Row Name 05/08/25 1405          Cognition    Orientation Status (Cognition) oriented x 4  -       Row Name 05/08/25 1405          Safety Issues/Impairments Affecting Functional Mobility    Impairments Affecting Function (Mobility) balance;endurance/activity tolerance;strength;postural/trunk control;cognition  -     Cognitive Impairments, Mobility Safety/Performance impulsivity;insight into deficits/self-awareness;problem-solving/reasoning;awareness, need for assistance;safety precaution awareness;attention;safety precaution follow-through;sequencing abilities;judgment  -               User Key  (r) = Recorded By, (t) = Taken By, (c) = Cosigned By      Initials Name Provider Type     Beverly Zacarias, PT Physical Therapist                   Mobility       Row Name 05/08/25 1406          Bed Mobility    Bed Mobility supine-sit;sit-supine  -     Supine-Sit [place] (Bed Mobility) contact guard;verbal cues  -     Sit-Supine [place] (Bed Mobility) contact guard;verbal cues  -     Assistive Device (Bed Mobility) head of bed elevated;bed rails  -       Row Name 05/08/25 1406          Sit-Stand Transfer    Sit-Stand [place] (Transfers) contact guard;verbal cues;minimum assist (75% patient effort)  -     Assistive Device (Sit-Stand Transfers) walker, front-wheeled  -       Row Name 05/08/25 1406           Gait/Stairs (Locomotion)    [place] Level (Gait) minimum assist (75% patient effort);verbal cues  -     Assistive Device (Gait) walker, front-wheeled  -     Distance in Feet (Gait) 30  -SM     Deviations/Abnormal Patterns (Gait) gait speed decreased;meg decreased;antalgic;ataxic  -     Bilateral Gait Deviations forward flexed posture  -     Comment, (Gait/Stairs) Gait unsteady with continuous cues needed for safety.  -               User Key  (r) = Recorded By, (t) = Taken By, (c) = Cosigned By      Initials Name Provider Type     Beverly Zacarias PT Physical Therapist                   Obj/Interventions       Row Name 05/08/25 1408          Range of Motion Comprehensive    General Range of Motion bilateral lower extremity ROM WFL  -       Row Name 05/08/25 1408          Strength Comprehensive (MMT)    General Manual Muscle Testing (MMT) Assessment lower extremity strength deficits identified  -     Comment, General Manual Muscle Testing (MMT) Assessment Generalized weakness  -       Row Name 05/08/25 1408          Balance    Balance Assessment sitting static balance;sitting dynamic balance;standing static balance;standing dynamic balance  -     Static Sitting Balance standby assist  -     Dynamic Sitting Balance contact guard  -     Position, Sitting Balance sitting edge of bed  -     Static Standing Balance contact guard;minimal assist  -     Dynamic Standing Balance minimal assist  -     Position/Device Used, Standing Balance supported;walker, front-wheeled  -     Balance Interventions sitting;standing;sit to stand;supported;static;dynamic  -               User Key  (r) = Recorded By, (t) = Taken By, (c) = Cosigned By      Initials Name Provider Type     Beverly Zacarias PT Physical Therapist                   Goals/Plan       Row Name 05/08/25 1419          Bed Mobility Goal 1 (PT)    Activity/Assistive Device (Bed Mobility Goal 1, PT) bed mobility activities, all   -SM     [place] Level/Cues Needed (Bed Mobility Goal 1, PT) independent  -SM     Time Frame (Bed Mobility Goal 1, PT) 1 week  -SM       Row Name 05/08/25 1419          Transfer Goal 1 (PT)    Activity/Assistive Device (Transfer Goal 1, PT) sit-to-stand/stand-to-sit;bed-to-chair/chair-to-bed  -SM     [place] Level/Cues Needed (Transfer Goal 1, PT) modified independence  -SM     Time Frame (Transfer Goal 1, PT) 1 week  -SM       Row Name 05/08/25 1419          Gait Training Goal 1 (PT)    Activity/Assistive Device (Gait Training Goal 1, PT) gait (walking locomotion)  -SM     [place] Level (Gait Training Goal 1, PT) modified independence  -SM     Distance (Gait Training Goal 1, PT) 150ft  -SM     Time Frame (Gait Training Goal 1, PT) 1 week  -SM               User Key  (r) = Recorded By, (t) = Taken By, (c) = Cosigned By      Initials Name Provider Type     Beverly Zacarias, PT Physical Therapist                   Clinical Impression       Row Name 05/08/25 1408          Pain    Pretreatment Pain Rating 0/10 - no pain  -SM     Posttreatment Pain Rating 0/10 - no pain  -SM       Row Name 05/08/25 1408          Plan of Care Review    Plan of Care Reviewed With patient  -SM     Outcome Evaluation Patient is an 89 y.o female who presented to [facility] with L eye blurry vision x1 month. Patient able to answer orientation questions appropriately but appears pleasently confused. Speech garbled and patient talking nonsensically. Patient recieved ativan prior to procedure earlier today and has been drowsy since. Patient lives at home alone and uses a cane for ambulation. Patients son at bedside reports he is in town from [place] and leaves tomorrow, other son will be assist following. Patient sat up to EOB with CGA. Patient stood from EOB with CGA-Patrick. Patient ambulated in hallway with Patrick. Initially attempted with can but patient very unsteady. Some improvement with support of rwx. Patient may continue to  benefit from skilled PT intervention to address deficits in functional mobility to maximize safety and independnce. Recommend 24/7 assist at home vs. SNF. Acute PT will continue to monitor.  -       Row Name 05/08/25 1408          Therapy Assessment/Plan (PT)    Rehab Potential (PT) good  -     Criteria for Skilled Interventions Met (PT) yes;skilled treatment is necessary  -     Therapy Frequency (PT) 5 times/wk  -       Row Name 05/08/25 1408          Vital Signs    Pre Patient Position Supine  -     Intra Patient Position Standing  -SM     Post Patient Position Supine  -       Row Name 05/08/25 1408          Positioning and Restraints    Pre-Treatment Position in bed  -SM     Post Treatment Position bed  -SM     In Bed notified nsg;call light within reach;encouraged to call for assist;exit alarm on;fowlers  -               User Key  (r) = Recorded By, (t) = Taken By, (c) = Cosigned By      Initials Name Provider Type     Beverly Zacarias, PT Physical Therapist                   Outcome Measures       Row Name 05/08/25 1419 05/08/25 0800       How much help from another person do you currently need...    Turning from your back to your side while in flat bed without using bedrails? 4  -SM 4  -JJ    Moving from lying on back to sitting on the side of a flat bed without bedrails? 3  -SM 4  -JJ    Moving to and from a bed to a chair (including a wheelchair)? 3  -SM 3  -JJ    Standing up from a chair using your arms (e.g., wheelchair, bedside chair)? 3  -SM 3  -JJ    Climbing 3-5 steps with a railing? 2  -SM 2  -JJ    To walk in hospital room? 2  -SM 2  -JJ    AM-PAC 6 Clicks Score (PT) 17  -SM 18  -JJ    Highest Level of Mobility Goal 5 --> Static standing  - 6 --> Walk 10 steps or more  -      Row Name 05/08/25 1419          Functional Assessment    Outcome Measure Options AM-PAC 6 Clicks Basic Mobility (PT)  -               User Key  (r) = Recorded By, (t) = Taken By, (c) = Cosigned By       Initials Name Provider Type     Beverly Zacarias, PT Physical Therapist    Diana Styles, RN Registered Nurse                                 Physical Therapy Education       Title: PT OT SLP Therapies (Done)       Topic: Physical Therapy (Done)       Point: Mobility training (Done)       Learning Progress Summary            Patient Acceptance, E, VU,NR by  at 5/8/2025 1419                      Point: Home exercise program (Done)       Learning Progress Summary            Patient Acceptance, E, VU,NR by  at 5/8/2025 1419                      Point: Body mechanics (Done)       Learning Progress Summary            Patient Acceptance, E, VU,NR by  at 5/8/2025 1419                      Point: Precautions (Done)       Learning Progress Summary            Patient Acceptance, E, VU,NR by  at 5/8/2025 1419                                      User Key       Initials Effective Dates Name Provider Type Discipline     05/02/22 -  Beverly Zacarias PT Physical Therapist PT                  PT Recommendation and Plan     Outcome Evaluation: Patient is an 89 y.o female who presented to [facility] with L eye blurry vision x1 month. Patient able to answer orientation questions appropriately but appears pleasently confused. Speech garbled and patient talking nonsensically. Patient recieved ativan prior to procedure earlier today and has been drowsy since. Patient lives at home alone and uses a cane for ambulation. Patients son at bedside reports he is in town from [place] and leaves tomorrow, other son will be assist following. Patient sat up to EOB with CGA. Patient stood from EOB with CGA-Patrick. Patient ambulated in hallway with Patrick. Initially attempted with can but patient very unsteady. Some improvement with support of rwx. Patient may continue to benefit from skilled PT intervention to address deficits in functional mobility to maximize safety and independnce. Recommend 24/7 assist at home vs. SNF. Acute PT will  continue to monitor.     Time Calculation:         PT Charges       Row Name 05/08/25 1420 05/08/25 0951          Time Calculation    Start Time 1335  -SM --     Stop Time 1348  -SM --     Time Calculation (min) 13 min  -SM --     PT Received On 05/08/25  - --     PT - Next Appointment 05/09/25  - 05/09/25  -     PT Goal Re-Cert Due Date 05/15/25  - --        Time Calculation- PT    Total Timed Code Minutes- PT 8 minute(s)  -SM --        Timed Charges    98583 - PT Therapeutic Activity Minutes 8  -SM --        Total Minutes    Timed Charges Total Minutes 8  -SM --      Total Minutes 8  -SM --               User Key  (r) = Recorded By, (t) = Taken By, (c) = Cosigned By      Initials Name Provider Type     Birdie Crowley, PT Physical Therapist     Beverly Zacarias, PT Physical Therapist                  Therapy Charges for Today       Code Description Service Date Service Provider Modifiers Qty    61069647384 HC PT THERAPEUTIC ACT EA 15 MIN 5/8/2025 Beverly Zacarias, PT GP 1    34289996753 HC PT EVAL LOW COMPLEXITY 3 5/8/2025 Beverly Zacarias, PT GP 1            PT G-Codes  Outcome Measure Options: AM-PAC 6 Clicks Basic Mobility (PT)  AM-PAC 6 Clicks Score (PT): 17  PT Discharge Summary  Anticipated Discharge Disposition (PT): home with 24/7 care, home with home health, skilled nursing facility    Beverly Zacarias PT  5/8/2025

## 2025-05-08 NOTE — PLAN OF CARE
Goal Outcome Evaluation:  Plan of Care Reviewed With: patient           Outcome Evaluation: Patient is an 89 y.o female who presented to [facility] with L eye blurry vision x1 month. Patient able to answer orientation questions appropriately but appears pleasently confused. Speech garbled and patient talking nonsensically. Patient recieved ativan prior to procedure earlier today and has been drowsy since. Patient lives at home alone and uses a cane for ambulation. Patients son at bedside reports he is in town from [place] and leaves tomorrow, other son will be assist following. Patient sat up to EOB with CGA. Patient stood from EOB with CGA-Patrick. Patient ambulated in hallway with Patrick. Initially attempted with can but patient very unsteady. Some improvement with support of rwx. Patient may continue to benefit from skilled PT intervention to address deficits in functional mobility to maximize safety and independnce. Recommend 24/7 assist at home vs. SNF. Acute PT will continue to monitor.    Anticipated Discharge Disposition (PT): home with 24/7 care, home with home health, skilled nursing facility

## 2025-05-08 NOTE — DISCHARGE SUMMARY
ED OBSERVATION PROGRESS/DISCHARGE SUMMARY    Date of Admission: 5/7/2025   LOS: 0 days   PCP: Josef Kemp III, NP-C    Final Diagnosis right eye vision loss      Subjective     Hospital Outcome: Veronica Babb is a 89 y.o. female patient comes in complaining of blurred vision of the right eye.  Patient states that she has had issues with blurred vision of her right eye for some time.  Patient denies any pain.  Patient states she was had a routine checkup of her eye doctor today.  She was told at the eye doctor office that they are worried for a Rustam retinal vein occlusion and recommended to go to the ER for further evaluation.  Patient is somewhat forgetful in conversation however she is alert and oriented times 3 out of 3 currently.  Patient denies any numbness or tingling of face or extremities, speech difficulty or weakness of extremities.  Patient states she walks with a cane and walker at baseline and lives home alone.     In the ED, troponin 27, repeat troponin 25, sodium of 135 otherwise unremarkable CMP for acute findings.  CTA head and neck showed no acute findings.  Partially seen left upper lobe spiculated soft tissue density lung mass at least 1.9 x 2.6 cm in size.  Almost certainly representing a bronchogenic carcinoma.  Patient is afebrile, pulse in the 70s, on room air oxygen 100% SpO2 and blood pressure 190s over 90s.  Patient given Tylenol in ED.     5/8/2025: Patient denies any change in her vision today.  MRI brain negative for any acute issues.  Echo obtained and shows normal EF and negative saline test.  Neurology has seen and evaluated the patient and does recommend aspirin 81 mg and Lipitor 80 mg daily but otherwise no further inpatient testing indicated at this time.  Patient okay for discharge from neurology standpoint.  Plan for follow-up with neurology in 2 months.  Patient will need to continue following with ophthalmology as well.  Patient is advised not to drive and is  recommended to get an outpatient occupational driving evaluation if she decides that she would like to start driving again.  All labs and imaging findings discussed with patient as well as specialist recommendations and patient is agreeable for discharge home at this time.    ROS:  General: no fevers, chills  Respiratory: no cough, dyspnea  Cardiovascular: no chest pain, palpitations  Abdomen: No abdominal pain, nausea, vomiting, or diarrhea  Neurologic: No focal weakness    Objective   Physical Exam:  I have reviewed the vital signs.  Temp:  [97.9 °F (36.6 °C)-98.4 °F (36.9 °C)] 98.1 °F (36.7 °C)  Heart Rate:  [65-98] 78  Resp:  [15-18] 18  BP: (134-190)/() 134/80  General Appearance:    Alert, cooperative, no distress  Head:    Normocephalic, atraumatic, normal hearing  Eyes:    Sclerae anicteric  Neck:   Supple, nontender  Lungs: Clear to auscultation bilaterally, respirations unlabored on room air  Heart: Regular rate and rhythm, S1 and S2 normal, no murmur  Abdomen:  Soft, nontender, bowel sounds active, nondistended  Extremities: No clubbing, cyanosis, or edema to lower extremities  Pulses:  2+ and symmetric in distal lower extremities  Skin: No rashes   Neurologic: Oriented x3, Normal strength to extremities    Results Review:    I have reviewed the labs, radiology results and diagnostic studies.    Results from last 7 days   Lab Units 05/08/25  0336   WBC 10*3/mm3 4.58   HEMOGLOBIN g/dL 10.4*   HEMATOCRIT % 32.2*   PLATELETS 10*3/mm3 230     Results from last 7 days   Lab Units 05/08/25  0336 05/07/25  1548   SODIUM mmol/L 135* 135*   POTASSIUM mmol/L 4.1 4.3   CHLORIDE mmol/L 101 101   CO2 mmol/L 25.3 23.3   BUN mg/dL 20 19   CREATININE mg/dL 0.86 0.76   CALCIUM mg/dL 8.8 9.1   BILIRUBIN mg/dL  --  0.3   ALK PHOS U/L  --  82   ALT (SGPT) U/L  --  5   AST (SGOT) U/L  --  15   GLUCOSE mg/dL 87 83     Imaging Results (Last 24 Hours)       Procedure Component Value Units Date/Time    XR Chest PA & Lateral  [[id number]] Collected: 05/07/25 2125     Updated: 05/08/25 0708    Narrative:      XR CHEST PA AND LATERAL-     HISTORY: 89-year-old female with 2.6 x 1.9 cm pleural-based left upper  lobe lung mass seen on CTA neck performed earlier today.     FINDINGS: The 2.6 x 1.9 cm pleural-based mass at the medial aspect of  the left upper lobe seen on the CTA neck is essentially inconspicuous on  this chest radiograph. Evaluation for metastatic lymphadenopathy and  follow-up of the mass is recommended with a nonemergent chest CT.     There is no CHF.     This report was finalized on 5/8/2025 7:05 AM by Dr. Eboni Newsome M.D on  Workstation: [id number]       CT Angiogram Neck [[id number]] Collected: 05/07/25 1842     Updated: 05/07/25 1858    Narrative:      HEAD CT WITHOUT CONTRAST, HEAD & NECK CTA WITH CONTRAST     INDICATION:  Right monocular vision loss.     TECHNIQUE:  Axial images were acquired from the skull base to vertex without  contrast, including multiplanar reformats, per standard departmental  protocol , followed by CT angiogram of the head and neck with IV  contrast. 3-D postprocessing was performed and reviewed.  Evaluation for  a significant carotid arterial stenosis is based on the NASCET criteria.   Radiation dose reduction techniques were utilized, including automated  exposure control, and exposure modulation based on body size.     COMPARISON:  None available.     FINDINGS:     Head CT: There is no CT evidence of acute intracranial hemorrhage, mass,  or infarct. There is advanced volume loss, but there is no evidence of  hydrocephalus or extra-axial fluid collection.  There are advanced  nonspecific white matter changes, but there is no evidence of acute  intracranial abnormality.     CTA neck: There is a normal aortic arch branching pattern without  proximal great vessel stenosis. Both vertebral arteries are patent  throughout the neck, and supply the basilar artery.  Both common carotid  arteries are  normally patent. There is plaque at both cervical carotid  bifurcations, with 0% stenosis in both internal carotid arteries.     CTA head:  There is symmetric distal intracranial runoff in the  anterior, middle, and posterior cerebral artery territories.  There is  no evidence of intracranial aneurysm, or of high-grade intracranial  flow-limiting stenosis.  There is no evidence of branch vessel  occlusion, or region or zone of hypoperfusion.  The dural venous sinuses  appear normal.     Extravascular structures: There is no intracranial mass or abnormal  enhancement.   The extracranial and cervical soft tissue structures show  no acute abnormality. There is no acute bony abnormality, but there are  extensive advanced cervical spinal degenerative changes. Partially  visualized medial left apical spiculated soft tissue mass measuring at  least 1.9 x 2.6 cm in size, almost certainly representing bronchogenic  carcinoma. Partially seen prominent AP window lymph node measuring at  least 1.1 x 3.0 cm.       Impression:         Head CT demonstrates advanced senescent changes without convincing  evidence of acute intracranial abnormality.     There is plaque at both cervical carotid bifurcations, but 0% stenosis  in both internal carotid arteries. Both vertebral arteries are patent  throughout the neck.     Negative head CT angiogram, no acute intracranial vascular abnormality.     Partially seen left upper lobe spiculated soft tissue density lung mass  at least 1.9 x 2.6 cm in size, almost certainly representing  bronchogenic carcinoma, partially seen suspicious AP window lymph node.     CALL REPORT :  Results of the exam were discussed with Dr. Bey, the  emergency room physician involved in the care of the patient in the  emergency room at the time of this dictation.     This report was finalized on 5/7/2025 6:55 PM by Dr. Haris Rutledge M.D  on Workstation: [id number]       CT Angiogram Head [[id number]]  Collected: 05/07/25 1842     Updated: 05/07/25 1858    Narrative:      HEAD CT WITHOUT CONTRAST, HEAD & NECK CTA WITH CONTRAST     INDICATION:  Right monocular vision loss.     TECHNIQUE:  Axial images were acquired from the skull base to vertex without  contrast, including multiplanar reformats, per standard departmental  protocol , followed by CT angiogram of the head and neck with IV  contrast. 3-D postprocessing was performed and reviewed.  Evaluation for  a significant carotid arterial stenosis is based on the NASCET criteria.   Radiation dose reduction techniques were utilized, including automated  exposure control, and exposure modulation based on body size.     COMPARISON:  None available.     FINDINGS:     Head CT: There is no CT evidence of acute intracranial hemorrhage, mass,  or infarct. There is advanced volume loss, but there is no evidence of  hydrocephalus or extra-axial fluid collection.  There are advanced  nonspecific white matter changes, but there is no evidence of acute  intracranial abnormality.     CTA neck: There is a normal aortic arch branching pattern without  proximal great vessel stenosis. Both vertebral arteries are patent  throughout the neck, and supply the basilar artery.  Both common carotid  arteries are normally patent. There is plaque at both cervical carotid  bifurcations, with 0% stenosis in both internal carotid arteries.     CTA head:  There is symmetric distal intracranial runoff in the  anterior, middle, and posterior cerebral artery territories.  There is  no evidence of intracranial aneurysm, or of high-grade intracranial  flow-limiting stenosis.  There is no evidence of branch vessel  occlusion, or region or zone of hypoperfusion.  The dural venous sinuses  appear normal.     Extravascular structures: There is no intracranial mass or abnormal  enhancement.   The extracranial and cervical soft tissue structures show  no acute abnormality. There is no acute bony  abnormality, but there are  extensive advanced cervical spinal degenerative changes. Partially  visualized medial left apical spiculated soft tissue mass measuring at  least 1.9 x 2.6 cm in size, almost certainly representing bronchogenic  carcinoma. Partially seen prominent AP window lymph node measuring at  least 1.1 x 3.0 cm.       Impression:         Head CT demonstrates advanced senescent changes without convincing  evidence of acute intracranial abnormality.     There is plaque at both cervical carotid bifurcations, but 0% stenosis  in both internal carotid arteries. Both vertebral arteries are patent  throughout the neck.     Negative head CT angiogram, no acute intracranial vascular abnormality.     Partially seen left upper lobe spiculated soft tissue density lung mass  at least 1.9 x 2.6 cm in size, almost certainly representing  bronchogenic carcinoma, partially seen suspicious AP window lymph node.     CALL REPORT :  Results of the exam were discussed with Dr. Bey, the  emergency room physician involved in the care of the patient in the  emergency room at the time of this dictation.     This report was finalized on 5/7/2025 6:55 PM by Dr. Haris Rutledge M.D  on Workstation: [id number]               I have reviewed the medications.     Discharge Medications        ASK your doctor about these medications        Instructions Start Date   celecoxib 200 MG capsule  Commonly known as: CeleBREX   200 mg, Oral, Daily      dicyclomine 20 MG tablet  Commonly known as: BENTYL   20 mg, Oral, Every 6 Hours      lisinopril 40 MG tablet  Commonly known as: PRINIVIL,ZESTRIL   40 mg, Oral, Daily      pantoprazole 40 MG EC tablet  Commonly known as: PROTONIX   40 mg, Oral, Daily      SUMAtriptan 100 MG tablet  Commonly known as: IMITREX   TAKE 1 TABLET BY MOUTH AT ONSET OF HEADACHE. MAY REPEAT DOSE 1 TIME IN 2 HOURS IF HEADACHE NOT RELIEVED      zolpidem 5 MG tablet  Commonly known as: AMBIEN   5 mg, Oral, Nightly  PRN              ---------------------------------------------------------------------------------------------  Assessment & Plan   Assessment/Problem List    Vision loss, right eye      Plan:    Right eye vision loss  -Was seen at eye doctor for routine visit earlier today and evaluation was worrisome for possible retinal vein occlusion  -CTA head and neck showed no acute findings.  Partially seen left upper lobe spiculated soft tissue density lung mass at least 1.9 x 2.6 cm in size.  Almost certainly representing a bronchogenic carcinoma.    -MRI brain negative for any acute issues  - Echo showed normal EF and negative saline test  - Neurology saw and evaluated the patient and recommends continued aspirin 81 mg and Lipitor 80 mg daily no further inpatient workup recommended and would recommend that patient follow-up in their clinic in 2 months.  Patient also recommended not to drive and will need an outpatient occupational driving evaluation if she would like to return to driving.     Incidental finding of lung mass  - Poorly correlated on a chest x-ray and is recommended to get a nonemergent CT chest  -Outpatient referrals to pulmonology and oncology have been placed and patient aware     Essential hypertension, chronic, poorly controlled  - Permissive hypertension for now  - Continue lisinopril when appropriate, hold for now     GERD  - Continue home PPI    Disposition: Discharge to home    Follow-up after Discharge: PCP in 1 to 2 weeks, neurology in 2 months, referrals for pulmonology and oncology have been placed    This note will serve as a discharge summary    Kayleigh Jackson PA-C 05/08/25 07:24 EDT    I have worn appropriate PPE during this patient encounter, sanitized my hands both with entering and exiting patient's room.      38 minutes has been spent by [facility] providers in the care of this patient while under observation status on this date 05/08/25

## 2025-05-08 NOTE — PLAN OF CARE
Goal Outcome Evaluation:  Plan of Care Reviewed With: patient, son           Outcome Evaluation: Patient is 89 year old female presenting to [facility]25 with complaints of R eye blurred vision. MRI negative for acute abnormalities. PMH significant for HTN, IBS, osteoarthritis. At the of assessment, patient is oriented to self only and inaccurately identified her son as her spouse. Son provides prior level of function, reporting that the patient was typically independent with ADLs, utilized a cane for functional mobility, and lives alone. Currently, the patient requires min A for bed mobility and increased cueing for task completion. She also requires min A with STS transfers with the use of RW. Pt demonstrates slow gait with noticeable unsteadiness during short-distance mobility, and is unable to effectively use the cane due to difficulty advancing her lower extremities, progressed to use of RW for patient safety. Generalized confusion is present, with difficulty following commands throughout the session and questionable safety awareness. Patient exhibits deficits in strength, tolerance, balance, transfers, and mobility that significantly hinder independence. Skilled OT  intervention is recommended in the acute care setting, followed by IPR at time of discharge.    Anticipated Discharge Disposition (OT): inpatient rehabilitation facility

## 2025-05-08 NOTE — SIGNIFICANT NOTE
05/08/25 1000   Rehab Evaluation   Session Not Performed other (see comments)  (New orders received. Pt off the floor for echo and MRI at 0935. Discussed with RN. No acute need for speech therapy at this time. Will follow pending MRI results.)    ADDENDUM: MRI negative for acute changes. Will s/o at this time. Please re-consult as warranted.

## 2025-05-09 ENCOUNTER — TELEPHONE (OUTPATIENT)
Dept: INTERNAL MEDICINE | Facility: CLINIC | Age: 89
End: 2025-05-09
Payer: MEDICARE

## 2025-05-09 ENCOUNTER — TRANSITIONAL CARE MANAGEMENT TELEPHONE ENCOUNTER (OUTPATIENT)
Dept: CALL CENTER | Facility: HOSPITAL | Age: 89
End: 2025-05-09
Payer: MEDICARE

## 2025-05-09 NOTE — TELEPHONE ENCOUNTER
Caller: JOSEFINA CURTIS WITH [facility]    Relationship: Home Health    Best call back number: [phone]     What orders are you requesting (i.e. lab or imaging): VERBAL ORDERS FOR SKILLED NURSING AND PHYSICAL THERAPY

## 2025-05-09 NOTE — OUTREACH NOTE
Call Center TCM Note      Flowsheet Row Responses   [facility] patient discharged from? [facility]   Does the patient have one of the following disease processes/diagnoses(primary or secondary)? Other   TCM attempt successful? Yes  [Philip, son]   Call start time 1340   Call end time 1352   Discharge diagnosis Vision loss, right eye, Incidental finding of lung mass   Person spoke with today (if not patient) and relationship pt   Meds reviewed with patient/caregiver? Yes   Is the patient having any side effects they believe may be caused by any medication additions or changes? No   Does the patient have all medications ordered at discharge? Yes   Is the patient taking all medications as directed (includes completed medication regime)? Yes   Comments Advised to make appt with Opthalmology   Does the patient have an appointment with their PCP within 7-14 days of discharge? No  [Son wants to make Oncology appt first then schedule PCP appt]   Nursing Interventions Patient declined scheduling/rescheduling appointment at this time   What is the Home health agency?  [facility]    Has home health visited the patient within 72 hours of discharge? Unsure   Psychosocial issues? No   Did the patient receive a copy of their discharge instructions? Yes   Nursing interventions Reviewed instructions with patient   What is the patient's perception of their health status since discharge? Improving   Is the patient/caregiver able to teach back signs and symptoms related to disease process for when to call PCP? Yes   Is the patient/caregiver able to teach back signs and symptoms related to disease process for when to call 911? Yes   Is the patient/caregiver able to teach back the hierarchy of who to call/visit for symptoms/problems? PCP, Specialist, Home health nurse, Urgent Care, ED, 911 Yes   If the patient is a current smoker, are they able to teach back resources for cessation? Not a smoker   TCM call completed? Yes   Wrap up  additional comments Philip son states pt is doing ok, and right eye vision is blurred. Advised for pt to be seen by Opthalmology r/t blurred right eye vision. No medication issues. Philip is making Oncology appt r/t finding of left upper lung mass, and then wants to make PCP appt after Oncology appt is made.   Call end time 1352   Would this patient benefit from a Referral to [facility]? No   Is the patient interested in additional calls from an ambulatory ? No            Chely WOLF - Registered Nurse    5/9/2025, 13:58 EDT

## 2025-05-09 NOTE — TELEPHONE ENCOUNTER
Attempted to call percy from [employer] to give verbal orders.     Left Voice Mail. Okay to give verbal.     VERBAL ORDERS FOR SKILLED NURSING AND PHYSICAL THERAPY

## 2025-05-20 ENCOUNTER — TRANSCRIBE ORDERS (OUTPATIENT)
Dept: PULMONOLOGY | Facility: HOSPITAL | Age: 89
End: 2025-05-20
Payer: MEDICARE

## 2025-05-20 DIAGNOSIS — R91.8 LUNG NODULES: Primary | ICD-10-CM

## 2025-06-09 DIAGNOSIS — F51.01 PRIMARY INSOMNIA: ICD-10-CM

## 2025-06-10 RX ORDER — ZOLPIDEM TARTRATE 5 MG/1
5 TABLET ORAL NIGHTLY PRN
Qty: 30 TABLET | Refills: 1 | Status: SHIPPED | OUTPATIENT
Start: 2025-06-10

## 2025-06-10 NOTE — TELEPHONE ENCOUNTER
Rx Refill Note  Requested Prescriptions     Pending Prescriptions Disp Refills    zolpidem (AMBIEN) 5 MG tablet [Pharmacy Med Name: ZOLPIDEM 5MG TABLETS] 30 tablet      Sig: TAKE 1 TABLET BY MOUTH AT NIGHT AS NEEDED FOR SLEEP      Last office visit with prescribing clinician: 10/9/2024  Next office visit with prescribing clinician: 7/8/2025         Sun Whitlock MA  06/10/25, 08:10 EDT

## 2025-07-02 ENCOUNTER — OFFICE VISIT (OUTPATIENT)
Dept: INTERNAL MEDICINE | Facility: CLINIC | Age: 89
End: 2025-07-02
Payer: MEDICARE

## 2025-07-02 VITALS
HEIGHT: 62 IN | HEART RATE: 86 BPM | SYSTOLIC BLOOD PRESSURE: 122 MMHG | WEIGHT: 111.4 LBS | OXYGEN SATURATION: 97 % | BODY MASS INDEX: 20.5 KG/M2 | DIASTOLIC BLOOD PRESSURE: 82 MMHG

## 2025-07-02 DIAGNOSIS — R91.8 PULMONARY MASS: ICD-10-CM

## 2025-07-02 DIAGNOSIS — I10 ESSENTIAL HYPERTENSION: Primary | Chronic | ICD-10-CM

## 2025-07-02 DIAGNOSIS — M16.12 PRIMARY OSTEOARTHRITIS OF LEFT HIP: Chronic | ICD-10-CM

## 2025-07-02 DIAGNOSIS — F32.9 REACTIVE DEPRESSION: Chronic | ICD-10-CM

## 2025-07-02 DIAGNOSIS — F51.01 PRIMARY INSOMNIA: Chronic | ICD-10-CM

## 2025-07-02 PROCEDURE — 99214 OFFICE O/P EST MOD 30 MIN: CPT | Performed by: NURSE PRACTITIONER

## 2025-07-02 PROCEDURE — 1159F MED LIST DOCD IN RCRD: CPT | Performed by: NURSE PRACTITIONER

## 2025-07-02 PROCEDURE — G2211 COMPLEX E/M VISIT ADD ON: HCPCS | Performed by: NURSE PRACTITIONER

## 2025-07-02 PROCEDURE — 1125F AMNT PAIN NOTED PAIN PRSNT: CPT | Performed by: NURSE PRACTITIONER

## 2025-07-02 PROCEDURE — 1160F RVW MEDS BY RX/DR IN RCRD: CPT | Performed by: NURSE PRACTITIONER

## 2025-07-02 RX ORDER — ESCITALOPRAM OXALATE 5 MG/1
5 TABLET ORAL NIGHTLY
Qty: 30 TABLET | Refills: 2 | Status: SHIPPED | OUTPATIENT
Start: 2025-07-02

## 2025-07-02 RX ORDER — ZOLPIDEM TARTRATE 5 MG/1
5 TABLET ORAL NIGHTLY PRN
Qty: 30 TABLET | Refills: 1 | Status: CANCELLED | OUTPATIENT
Start: 2025-07-02

## 2025-07-02 RX ORDER — ZOLPIDEM TARTRATE 10 MG/1
10 TABLET ORAL NIGHTLY PRN
Qty: 30 TABLET | Refills: 5 | Status: SHIPPED | OUTPATIENT
Start: 2025-07-02

## 2025-07-02 NOTE — PROGRESS NOTES
Chief Complaint  Hip Pain (Left hip )     Subjective:      History of Present Illness {CC  Problem List  Visit  Diagnosis   Encounters  Notes  Medications  Labs  Result Review Imaging  Media :23}     Veronica Babb presents to [facility] for:      She chronically has hypertension, migraines, IBS, insomnia.     Has not been seen by me since Oct 9th 2024.   Cancelled multiple appointments.   Her son is with her today.     Hospital admission:   Discharge Summary by Kayleigh Jackson PA-C (05/08/2025 7:24 AM)     Was getting routine eye exam: referred to ER.   R eye vision loss: CTA head and neck - no acute findings.   Neurology eval: advised to continue ASA and statin.   Advised no driving.     Incidental lung mass found on chest CT: referred to pulm and oncology but looks as though they have cancelled all of these appointments as well.     States son has been out of town and also they had to cancel due to tech not being available.     States will have done next week.   They have opted on not having chemo but would consider radiation.     States taking ASA most days but not daily.   Not taking statin - not sure why.  Son states she has it at home and will restart.     Has follow up appt with neurology this afternoon. Son states has no knowledge of appointment this afternoon and will verify/ reschedule if he can't go.     She is worried about being a burden on family.  Requests to restart lexapro for mood.    Denies SI     Ready to follow up with ortho regarding hip - advised to wait until PET scan and plan with lung nodule.     Insomnia: son states that she had been taking 10 mg and running out of medication early and then she would be restless.                 I have reviewed patient's medical history, any new submitted information provided by patient or medical assistant and updated medical record.      Objective:      Physical Exam  Vitals reviewed.   Constitutional:   "     Appearance: Normal appearance. She is well-developed.   Neck:      Thyroid: No thyromegaly.   Cardiovascular:      Rate and Rhythm: Normal rate and regular rhythm.      Pulses: Normal pulses.      Heart sounds: Normal heart sounds.   Pulmonary:      Effort: Pulmonary effort is normal.      Breath sounds: Normal breath sounds.      Comments: E/U   Abdominal:      General: Bowel sounds are normal.   Skin:     General: Skin is warm and dry.   Neurological:      Mental Status: She is alert and oriented to person, place, and time.   Psychiatric:         Mood and Affect: Mood normal.         Behavior: Behavior is cooperative.        Result Review  Data Reviewed:{ Labs  Result Review  Imaging  Med Tab  Media :23}     The following data was reviewed by: Josef Kemp III, NP-C on 07/02/2025  Common labs          10/9/2024    12:26 5/7/2025    15:48 5/8/2025    03:36   Common Labs   Glucose 83  83  87    BUN 16  19  20    Creatinine 0.88  0.76  0.86    Sodium 135  135  135    Potassium 4.6  4.3  4.1    Chloride 98  101  101    Calcium 9.8  9.1  8.8    Albumin  3.8     Total Bilirubin  0.3     Alkaline Phosphatase  82     AST (SGOT)  15     ALT (SGPT)  5     WBC 4.87  5.49  4.58    Hemoglobin 12.7  11.0  10.4    Hematocrit 39.2  33.4  32.2    Platelets 241  245  230    Total Cholesterol   178    Triglycerides   97    HDL Cholesterol   55    LDL Cholesterol    105    Hemoglobin A1C   5.20             Vital Signs:   /82 (BP Location: Left arm, Patient Position: Sitting, Cuff Size: Adult)   Pulse 86   Ht 157.5 cm (62\")   Wt 50.5 kg (111 lb 6.4 oz)   SpO2 97%   BMI 20.38 kg/m²   Estimated body mass index is 20.38 kg/m² as calculated from the following:    Height as of this encounter: 157.5 cm (62\").    Weight as of this encounter: 50.5 kg (111 lb 6.4 oz).        Requested Prescriptions     Signed Prescriptions Disp Refills    escitalopram (Lexapro) 5 MG tablet 30 tablet 2     Sig: Take 1 tablet " by mouth Every Night.    zolpidem (AMBIEN) 10 MG tablet 30 tablet 5     Sig: Take 1 tablet by mouth At Night As Needed for Sleep.       Routine medications provided by this office will also be refilled via pharmacy request.       Current Outpatient Medications:     dicyclomine (BENTYL) 20 MG tablet, TAKE 1 TABLET BY MOUTH EVERY 6 HOURS, Disp: 60 tablet, Rfl: 5    lisinopril (PRINIVIL,ZESTRIL) 40 MG tablet, TAKE 1 TABLET BY MOUTH DAILY, Disp: 90 tablet, Rfl: 1    pantoprazole (PROTONIX) 40 MG EC tablet, Take 1 tablet by mouth Daily., Disp: 90 tablet, Rfl: 3    SUMAtriptan (IMITREX) 100 MG tablet, TAKE 1 TABLET BY MOUTH AT ONSET OF HEADACHE. MAY REPEAT DOSE 1 TIME IN 2 HOURS IF HEADACHE NOT RELIEVED, Disp: 12 tablet, Rfl: 0    aspirin 81 MG EC tablet, Take 1 tablet by mouth Daily. (Patient not taking: Reported on 7/2/2025), Disp: 30 tablet, Rfl: 0    atorvastatin (Lipitor) 80 MG tablet, Take 1 tablet by mouth Daily. (Patient not taking: Reported on 7/2/2025), Disp: 90 tablet, Rfl: 0    escitalopram (Lexapro) 5 MG tablet, Take 1 tablet by mouth Every Night., Disp: 30 tablet, Rfl: 2    zolpidem (AMBIEN) 10 MG tablet, Take 1 tablet by mouth At Night As Needed for Sleep., Disp: 30 tablet, Rfl: 5     Assessment and Plan:      Assessment and Plan {CC Problem List  Visit Diagnosis  ROS  Review (Popup)  Health Maintenance  Quality  BestPractice  Medications  SmartSets  SnapShot Encounters  Media :23}     Diagnoses and all orders for this visit:    1. Essential hypertension (Primary)  Overview:  Prior treatment:  norvasc     Assessment & Plan:  Hypertension is improving with treatment.  Continue current treatment regimen.  Blood pressure will be reassessed at the next regular appointment.  Continue lisinopril       2. Primary insomnia  Overview:  Continues ambien as needed.     Orders:  -     zolpidem (AMBIEN) 10 MG tablet; Take 1 tablet by mouth At Night As Needed for Sleep.  Dispense: 30 tablet; Refill: 5    3.  Pulmonary mass  Assessment & Plan:  Son states will get follow up appointments scheduled next week       4. Primary osteoarthritis of left hip  Assessment & Plan:  They will wait until PET scan has been completed and then will follow up with ortho.       5. Reactive depression  Assessment & Plan:  Will restart lexapro: 5 mg nightly   Follow up 2-3 months     Orders:  -     escitalopram (Lexapro) 5 MG tablet; Take 1 tablet by mouth Every Night.  Dispense: 30 tablet; Refill: 2      They both agree: any treatment on lung mass would be palliative  Would not be interested in chemotherapy.   Prefers to focus on quality of life.            New Medications Ordered This Visit   Medications    escitalopram (Lexapro) 5 MG tablet     Sig: Take 1 tablet by mouth Every Night.     Dispense:  30 tablet     Refill:  2    zolpidem (AMBIEN) 10 MG tablet     Sig: Take 1 tablet by mouth At Night As Needed for Sleep.     Dispense:  30 tablet     Refill:  5             Follow Up {Instructions Charge Capture  Follow-up Communications :23}     Return in about 2 months (around 9/2/2025) for Medicare Wellness.      Patient was given instructions and counseling regarding her condition or for health maintenance advice. Please see specific information pulled into the AVS if appropriate.    Dragon disclaimer:   Much of this encounter note is an electronic transcription/translation of spoken language to printed text. The electronic translation of spoken language may permit erroneous, or at times, nonsensical words or phrases to be inadvertently transcribed; Although I have reviewed the note for such errors, some may still exist.     Additional Patient Counseling:       There are no Patient Instructions on file for this visit.

## 2025-07-25 DIAGNOSIS — G43.019 INTRACTABLE MIGRAINE WITHOUT AURA AND WITHOUT STATUS MIGRAINOSUS: ICD-10-CM

## 2025-07-25 RX ORDER — SUMATRIPTAN SUCCINATE 100 MG/1
100 TABLET ORAL SEE ADMIN INSTRUCTIONS
Qty: 12 TABLET | Refills: 0 | Status: SHIPPED | OUTPATIENT
Start: 2025-07-25

## (undated) DEVICE — PREP SOL POVIDONE/IODINE BT 4OZ

## (undated) DEVICE — SYS CLS SKIN PREMIERPRO EXOFINFUSION 22CM

## (undated) DEVICE — GLV SURG SENSICARE PI PF LF 7 GRN STRL

## (undated) DEVICE — TRAP FLD MINIVAC MEGADYNE 100ML

## (undated) DEVICE — SUT VIC 0 CT1 36IN J946H

## (undated) DEVICE — GLV SURG SENSICARE PI MIC PF SZ7 LF STRL

## (undated) DEVICE — SOL NACL 0.9PCT 100ML SGL

## (undated) DEVICE — PENCL E/S ULTRAVAC TELESCP NOSE HOLSTR 10FT

## (undated) DEVICE — 3M™ IOBAN™ 2 ANTIMICROBIAL INCISE DRAPE 6640EZ: Brand: IOBAN™ 2

## (undated) DEVICE — STPLR SKIN VISISTAT WD 35CT

## (undated) DEVICE — SUT VIC 1 CT1 36IN J947H

## (undated) DEVICE — GLV SURG SENSICARE W/ALOE PF LF 8 STRL

## (undated) DEVICE — GLV SURG PREMIERPRO ORTHO LTX PF SZ7.5 BRN

## (undated) DEVICE — PK KN TOTL 40

## (undated) DEVICE — STERILE PATIENT PROTECTIVE PAD FOR IMP® KNEE POSITIONERS & COHESIVE WRAP (10 / CASE): Brand: DE MAYO KNEE POSITIONER®

## (undated) DEVICE — PREMIUM WET SKIN PREP TRAY: Brand: MEDLINE INDUSTRIES, INC.

## (undated) DEVICE — APPL DURAPREP IODOPHOR APL 26ML

## (undated) DEVICE — DUAL CUT SAGITTAL BLADE

## (undated) DEVICE — BNDG ELAS ELITE V/CLOSE 6IN 5YD LF STRL

## (undated) DEVICE — GLV SURG SENSICARE W/ALOE PF LF 7.5 STRL

## (undated) DEVICE — MAT FLR ABSORBENT LG 4FT 10 2.5FT

## (undated) DEVICE — UNDERCAST PADDING: Brand: DEROYAL

## (undated) DEVICE — KT DRN EVAC WND PVC PCH WTROC RND 10F400

## (undated) DEVICE — MEDI-VAC YANKAUER SUCTION HANDLE W/BULBOUS TIP: Brand: CARDINAL HEALTH

## (undated) DEVICE — NEEDLE, QUINCKE 22GX3.5": Brand: MEDLINE INDUSTRIES, INC.